# Patient Record
Sex: FEMALE | Race: WHITE | NOT HISPANIC OR LATINO | Employment: OTHER | URBAN - METROPOLITAN AREA
[De-identification: names, ages, dates, MRNs, and addresses within clinical notes are randomized per-mention and may not be internally consistent; named-entity substitution may affect disease eponyms.]

---

## 2018-01-10 ENCOUNTER — TELEPHONE (OUTPATIENT)
Dept: PAIN MEDICINE | Facility: CLINIC | Age: 73
End: 2018-01-10

## 2018-01-10 ENCOUNTER — APPOINTMENT (OUTPATIENT)
Dept: WOMENS IMAGING | Facility: HOSPITAL | Age: 73
End: 2018-01-10

## 2018-01-10 PROCEDURE — 77067 SCR MAMMO BI INCL CAD: CPT | Performed by: RADIOLOGY

## 2018-02-28 PROBLEM — R51.9 FREQUENT HEADACHES: Status: ACTIVE | Noted: 2018-02-28

## 2018-02-28 PROBLEM — M79.18 MYOFASCIAL PAIN: Status: ACTIVE | Noted: 2018-02-28

## 2018-02-28 PROBLEM — F10.11 HISTORY OF ALCOHOL ABUSE: Status: ACTIVE | Noted: 2018-02-28

## 2018-02-28 PROBLEM — M47.812 CERVICAL SPONDYLOSIS WITHOUT MYELOPATHY: Status: ACTIVE | Noted: 2018-02-28

## 2018-02-28 PROBLEM — Z95.5 HISTORY OF CORONARY ARTERY STENT PLACEMENT: Status: ACTIVE | Noted: 2018-02-28

## 2018-02-28 PROBLEM — Z98.890 HISTORY OF LUMBAR LAMINECTOMY FOR SPINAL CORD DECOMPRESSION: Status: ACTIVE | Noted: 2018-02-28

## 2018-03-01 ENCOUNTER — OFFICE VISIT (OUTPATIENT)
Dept: PAIN MEDICINE | Facility: CLINIC | Age: 73
End: 2018-03-01

## 2018-03-01 VITALS
HEIGHT: 63 IN | TEMPERATURE: 96.2 F | SYSTOLIC BLOOD PRESSURE: 124 MMHG | RESPIRATION RATE: 18 BRPM | WEIGHT: 176 LBS | HEART RATE: 64 BPM | OXYGEN SATURATION: 98 % | DIASTOLIC BLOOD PRESSURE: 78 MMHG | BODY MASS INDEX: 31.18 KG/M2

## 2018-03-01 DIAGNOSIS — Z98.890 HISTORY OF LUMBAR LAMINECTOMY FOR SPINAL CORD DECOMPRESSION: ICD-10-CM

## 2018-03-01 DIAGNOSIS — Z95.5 HISTORY OF CORONARY ARTERY STENT PLACEMENT: ICD-10-CM

## 2018-03-01 DIAGNOSIS — M47.812 CERVICAL SPONDYLOSIS WITHOUT MYELOPATHY: ICD-10-CM

## 2018-03-01 DIAGNOSIS — M79.18 MYOFASCIAL PAIN: ICD-10-CM

## 2018-03-01 DIAGNOSIS — F10.11 HISTORY OF ALCOHOL ABUSE: ICD-10-CM

## 2018-03-01 DIAGNOSIS — R51.9 FREQUENT HEADACHES: ICD-10-CM

## 2018-03-01 PROCEDURE — 99214 OFFICE O/P EST MOD 30 MIN: CPT | Performed by: ANESTHESIOLOGY

## 2018-03-01 RX ORDER — LISINOPRIL 5 MG/1
TABLET ORAL DAILY
COMMUNITY
Start: 2015-07-08 | End: 2019-10-10

## 2018-03-01 RX ORDER — FLUOXETINE HYDROCHLORIDE 40 MG/1
40 CAPSULE ORAL DAILY
COMMUNITY
End: 2022-05-31 | Stop reason: SDUPTHER

## 2018-03-01 RX ORDER — CLOPIDOGREL BISULFATE 75 MG/1
75 TABLET ORAL DAILY
Status: ON HOLD | COMMUNITY
Start: 2015-07-08 | End: 2019-10-19 | Stop reason: SDUPTHER

## 2018-03-01 RX ORDER — ASPIRIN 81 MG/1
81 TABLET, CHEWABLE ORAL DAILY
Status: ON HOLD | COMMUNITY
Start: 2015-07-08 | End: 2022-11-04 | Stop reason: SDUPTHER

## 2018-03-01 RX ORDER — TIZANIDINE 2 MG/1
TABLET ORAL
Qty: 90 TABLET | Refills: 3 | Status: SHIPPED | OUTPATIENT
Start: 2018-03-01 | End: 2018-08-21 | Stop reason: SDUPTHER

## 2018-03-01 RX ORDER — ATORVASTATIN CALCIUM 80 MG/1
TABLET, FILM COATED ORAL
COMMUNITY
Start: 2015-07-08 | End: 2019-10-10

## 2018-03-01 RX ORDER — RANITIDINE 150 MG/1
75 CAPSULE ORAL EVERY EVENING
COMMUNITY
Start: 2015-07-08 | End: 2022-07-12

## 2018-03-01 RX ORDER — ALBUTEROL SULFATE 90 UG/1
AEROSOL, METERED RESPIRATORY (INHALATION) EVERY 4 HOURS
COMMUNITY
Start: 2015-07-08 | End: 2019-03-06

## 2018-03-01 RX ORDER — ACETAMINOPHEN 325 MG/1
650 TABLET ORAL EVERY 4 HOURS PRN
COMMUNITY
Start: 2015-07-08

## 2018-03-01 NOTE — PROGRESS NOTES
"Chief Complaint: \"I did great after my injection in 2015. Now, my neck pain has returned.\"     History of Present Illness:   Patient: Ms. Alysia Aguayo, 72 y.o. female   Former patient. Last seen on 07/22/2015 for diagnostic/therapeutic right cervical facet joint injections at C5-C6, C6-C7, combined with trigger point injections at the right trapezius, and right levator scapulae.  Patient experienced complete pain relief and functional improvement that lasted for more than two years. Then, pain progressively recurred although not to baseline levels.  Pain history: Patient reports a 20 year history of pain, which began after riding a roller coaster.   Pain description: constant pain with intermittent exacerbation, described as aching, sharp and throbbing sensation.   Radiation of pain: The neck pain radiates into the shoulder blades. Patient reports the pain on the right side is worse than the pain on the left side.    Pain intensity today: 3/10  Average pain intensity last week: 8/10  Pain intensity ranges from: 2/10 to 8/10  Aggravating factors: Pain increases with range of motion of the cervical spine, sitting up straight for long periods of times, household chores, moving, and being active.  Alleviating factors: Pain decreases with analgesics and lying down.   Associated symptoms:   Patient denies pain, numbness and weakness in the bilateral upper extremities.   Patient denies any new bladder or bowel problems.   Patient denies difficulties with her balance or recent falls.   Patient reports occipital headaches associated with her neck pain.     Review of previous therapies and additional medical records:  Alysia Aguayo has already failed the following measures, including:   Conservative measures: oral analgesics, physical therapy, ice and heat   Interventional measures: Patient underwent diagnostic/therapeutic right cervical facet joint injections at C5-C6, C6-C7, combined with trigger point injections at " the right trapezius, and right levator scapulae on 07/22/2015 and reports complete pain relief for almost 2 years.   Surgical measures: None  Alysia Aguayo underwent neurosurgical consultation with Dr. Dionicio Pollard on 05/12/2015, and was found not to be a surgical candidate.  Alysia Aguayo presents with significant comorbidities including depression, hypertension, coronary artery disease with anticoagulation therapy, and hyperlipidemia; engaged in treatment. Patient takes Plavix daily, prescribed by Dr. Bloom related to placement of 3 stents in the heart. She reports discontinuing her anticoagulant therapy in the past to undergo procedures.  In terms of current analgesics, Alysia Aguayo takes: Tylenol  I have reviewed [facility] Report #[id number] consistent to medication reconciliation.      Review of Previous Diagnostic Studies:    MRI Cervical Spine w/o Contrast on 04/28/2015: Cervical facet arthropathy. C5-C6, moderate to high-grade bilateral neural foraminal narrowing is seen. C6-C7, C6-C7, moderate to high-grade bilateral neural foraminal narrowing.  X-Ray Cervical Spine 4 View on 04/21/2015: Mild to moderate degenerative changes worse at C6-C7. Mild anterior listhesis of C5 on C6.    Review of Systems   Musculoskeletal: Positive for neck pain and neck stiffness.   All other systems reviewed and are negative.     Patient Active Problem List   Diagnosis   • Cervical spondylosis without myelopathy   • Myofascial pain   • Frequent headaches   • History of alcohol abuse   • History of coronary artery stent placement   • History of lumbar laminectomy for spinal cord decompression       Past Medical History:   Diagnosis Date   • Heart disease    • Kidney disease    • Neck pain          Past Surgical History:   Procedure Laterality Date   • CHOLECYSTECTOMY     • HYSTERECTOMY     • TUBAL ABDOMINAL LIGATION           Family History   Problem Relation Age of Onset   • Heart disease Mother    • Emphysema Mother    •  "Cancer Father          Social History     Social History   • Marital status:      Social History Main Topics   • Smoking status: Former Smoker   • Smokeless tobacco: Former User     Quit date: 4/17/2009   • Alcohol use No   • Drug use: No   • Sexual activity: Defer           Current Outpatient Prescriptions:   •  acetaminophen (TYLENOL) 325 MG tablet, Take  by mouth., Disp: , Rfl:   •  albuterol (VENTOLIN HFA) 108 (90 Base) MCG/ACT inhaler, Every 4 (Four) Hours., Disp: , Rfl:   •  aspirin (ASPIRIN 81) 81 MG chewable tablet, Chew Daily., Disp: , Rfl:   •  atorvastatin (LIPITOR) 80 MG tablet, Take  by mouth., Disp: , Rfl:   •  clopidogrel (PLAVIX) 75 MG tablet, Take  by mouth Daily., Disp: , Rfl:   •  FLUoxetine (PROZAC) 40 MG capsule, Take 40 mg by mouth Daily., Disp: , Rfl:   •  lisinopril (PRINIVIL,ZESTRIL) 5 MG tablet, Take  by mouth Daily., Disp: , Rfl:   •  ranitidine (ZANTAC) 150 MG capsule, Take  by mouth., Disp: , Rfl:       Allergies   Allergen Reactions   • Codeine GI Intolerance   • Penicillins Hives         /78 (BP Location: Right arm)  Pulse 64  Temp 96.2 °F (35.7 °C) (Temporal Artery )   Resp 18  Ht 160 cm (63\")  Wt 79.8 kg (176 lb)  SpO2 98%  BMI 31.18 kg/m2      Physical Exam:  Constitutional: Patient is oriented to person, place, and time. Vital signs are normal. Patient appears well-developed and well-nourished.   HENT: Head: Normocephalic and atraumatic. Eyes: Conjunctivae and lids are normal. Pupils: Equal, round, reactive to light.   Neck: Trachea normal. Neck supple. No JVD present.   Lymphatic: No cervical adenopathy  Pulmonary Respiratory effort: No increased work of breathing or signs of respiratory distress. Auscultation of lungs: Clear to auscultation.   Cardiovascular Auscultation of heart: Normal rate and rhythm, normal S1 and S2, no murmurs.   Musculoskeletal   Gait and station: Gait evaluation demonstrated a normal gait   Cervical spine: Passive and active range of " motion is limited secondary to pain. Extension, lateral flexion, rotation of the cervical spine increased and reproduced pain. Cervical facet joint loading maneuvers are positive.  Muscles: Presence of active trigger points a the right levator scapulae and right trapezius   Shoulders: The range of motion of the glenohumeral joints is full and without pain. Rotator cuff strength is 5/5.   Neurological: Patient is alert and oriented to person, place, and time. Speech: speech is normal. Cortical function: Normal mental status.   Cranial nerves: Cranial nerves 2-12 intact.   Reflex Scores:  Right brachioradialis: 2+  Left brachioradialis: 2+  Right biceps: 2+  Left biceps: 2+  Right triceps: 2+  Left triceps: 2+  Right patellar: 2+  Left patellar: 2+  Right achilles: 2+  Left achilles: 2+  Motor strength: 5/5  Motor Tone: normal tone.   Involuntary movements: none.   Superficial/Primitive Reflexes: primitive reflexes were absent.   Right Fox: absent  Left Fox: absent  Right ankle clonus: absent  Left ankle clonus: absent   Spurling sign is negative. Lhermitte sign is negative. Negative long tract signs. Straight leg raising test is negative. Femoral stretch sign is negative.   Sensation: No sensory loss. Sensory exam: intact to light touch, intact pain and temperature sensation, intact vibration sensation and normal proprioception.   Coordination: Normal finger to nose and heel to shin. Normal balance and negative. Romberg's sign negative.   Skin and subcutaneous tissue: Skin is warm and intact. No rash noted. No cyanosis.   Psychiatric: Judgment and insight: Normal. Orientation to person, place and time: Normal. Recent and remote memory: Intact. Mood and affect: Normal.     ASSESSMENT:   1. Cervical spondylosis without myelopathy    2. Myofascial pain    3. Frequent headaches    4. History of lumbar laminectomy for spinal cord decompression    5. History of coronary artery stent placement    6. History of  alcohol abuse      PLAN/MEDICAL DECISION MAKING:  I had a lengthy conversation with Ms. Alysia Aguayo regarding her chronic pain condition and potential therapeutic options including risks, benefits, alternative therapies, to name a few. Patient has failed to obtain pain relief with conservative measures, as referenced above. Patient underwent diagnostic/therapeutic right cervical facet joint injections at C5-C6, C6-C7, combined with trigger point injections at the right trapezius, and right levator scapulae on 07/22/2015 and reports complete pain relief for more than 2 years. Her cervical ROM continues to be improved.  I have reviewed all available patient's medical records as well as previous therapies as referenced above.  Therefore, I have proposed the following plan:  1. Interventional pain management measures: Patient will be scheduled for right cervical facet joint injections at C5-C6, C6-C7, combined with trigger point injections at the right trapezius, and right levator scapulae . If patient fails to obtain sustained pain relief, then, we will proceed with diagnostic bilateral cervical medial branch blocks at C4, C5, C6; for bilateral cervical facet joints at C5-C6, C6-C7 to clarify the origin of her unrelenting pain. If patient experiences more than 80% pain relief along with significant improvement in the range of motion of the cervical spine, then, patient will be scheduled for a second set of diagnostic bilateral cervical medial branch blocks, to then, proceed with cervical medial branch rhizotomies.  2. Pharmacological measures, as follows;  A. Trial with lamotrigine 2.5%, lidocaine 2.22%, prilocaine 2.22%, meloxicam 0.09% cream, apply 1 to 2 grams of cream to the affected areas every 4 to 6 hours as needed  B. Trial with tizanidine 2 mg ½ to 1 tablet three times a day as needed for muscle spasms   3.  Long-term rehabilitation efforts:  A. The patient does not have a history of falls. I did complete  a risk assessment for falls.   B. Patient will start a comprehensive physical therapy program for water therapy, upper body strengthening/posture correction, gait and balance training, neurodynamics, myofascial release, cupping and dry needling   C. Start an exercise program such as yoga and water therapy  D. Trial with TENS unit  E. Contrast therapy: Apply ice-packs for 15-20 minutes, followed by heating pads for 15-20 minutes to affected area   4. The patient and her family have been instructed to contact my office with any questions or difficulties. The patient understands the plan and agrees to proceed accordingly.       Patient Care Team:  ALEX Vick as PCP - General  Dionicio Pollard MD as Consulting Physician (Neurosurgery)  Heriberto Lopes MD as Consulting Physician (Pain Medicine)     New Medications Ordered This Visit   Medications   • clopidogrel (PLAVIX) 75 MG tablet     Sig: Take  by mouth Daily.   • atorvastatin (LIPITOR) 80 MG tablet     Sig: Take  by mouth.   • lisinopril (PRINIVIL,ZESTRIL) 5 MG tablet     Sig: Take  by mouth Daily.   • ranitidine (ZANTAC) 150 MG capsule     Sig: Take  by mouth.   • acetaminophen (TYLENOL) 325 MG tablet     Sig: Take  by mouth.   • albuterol (VENTOLIN HFA) 108 (90 Base) MCG/ACT inhaler     Sig: Every 4 (Four) Hours.   • aspirin (ASPIRIN 81) 81 MG chewable tablet     Sig: Chew Daily.   • FLUoxetine (PROZAC) 40 MG capsule     Sig: Take 40 mg by mouth Daily.         Future Appointments  Date Time Provider Department Center   3/1/2018 2:15 PM Heriberto Lopes MD [facility] None         Heriberto Lopes MD     EMR Dragon/Transcription disclaimer:  Much of this encounter note is an electronic transcription of spoken language to printed text. Electronic transcription of spoken language may permit erroneous, or at times, nonsensical words or phrases to be inadvertently transcribed. Although I have reviewed the note for such errors, some may still exist.

## 2018-03-07 ENCOUNTER — OUTSIDE FACILITY SERVICE (OUTPATIENT)
Dept: PAIN MEDICINE | Facility: CLINIC | Age: 73
End: 2018-03-07

## 2018-03-07 PROCEDURE — 99152 MOD SED SAME PHYS/QHP 5/>YRS: CPT | Performed by: ANESTHESIOLOGY

## 2018-03-07 PROCEDURE — 64490 INJ PARAVERT F JNT C/T 1 LEV: CPT | Performed by: ANESTHESIOLOGY

## 2018-03-07 PROCEDURE — 64491 INJ PARAVERT F JNT C/T 2 LEV: CPT | Performed by: ANESTHESIOLOGY

## 2018-07-24 ENCOUNTER — TELEPHONE (OUTPATIENT)
Dept: PAIN MEDICINE | Facility: CLINIC | Age: 73
End: 2018-07-24

## 2018-07-24 NOTE — TELEPHONE ENCOUNTER
Patient called on 07/23/2018 to make an appointment for evaluation of recurrent cervicalgia. Patient was last seen 03/09/2018, when she underwent diagnostic and therapeutic right cervical facet joint injections and experienced complete pain relief since then. She did not do PT after her procedure. She was not able to get a tens unit covered by insurance.   She is now complaining of left-sided neck pain that radiates into the left shoulder and left occipital region.   Pain increases with flexion, extension and rotation of the cervical spine   Pain decreases with: Nothing   She has tried ice, heat, icy hot, tylenol

## 2018-08-16 NOTE — PROGRESS NOTES
"Chief Complaint: \"Pain on the left side of my neck.\"     History of Present Illness: Ms. Alysia Aguayo, 72 y.o. female, who was last seen on 03/09/2018, when she underwent diagnostic and therapeutic right cervical facet joint injections and experienced almost complete pain relief for several months. She did not participate in PT after her procedure despite medical advice. She was not able to get a TENs unit due to her insurance. Patient called on 07/23/2018 to make an appointment for evaluation of recurrent cervicalgia. She is now complaining of left-sided neck pain that radiates into the left shoulder and left occipital region.   Pain history: Patient reports a 20+-year history of pain, which began after riding a roller coaster.   Pain description: constant pain with intermittent exacerbation, described as aching, sharp and throbbing sensation.   Radiation of pain: The neck pain radiates into the shoulder blades. Patient reports on left side of her neck. The right-sided pain has almost completely resolved since her procedure on 03/09/2018.  Pain intensity today: 7/10  Average pain intensity last week: 7/10  Pain intensity ranges from: 5/10 to 7/10  Aggravating factors: Pain increases with flexion, extension and rotation of the cervical spine   Alleviating factors: Tylenol, ice, heat, icy hot,     Associated symptoms:   Patient denies pain, numbness or weakness in the upper extremities.   Patient denies any new bladder or bowel problems.   Patient denies difficulties with her balance or recent falls.   Patient reports daily bilateral occipital headaches associated with her neck pain lasting all day.     Review of previous therapies and additional medical records:  Alysia Aguayo has already failed the following measures, including:   Conservative measures: oral analgesics, physical therapy, ice and heat   Interventional measures: Patient underwent diagnostic/therapeutic right cervical facet joint injections at " C5-C6, C6-C7, combined with trigger point injections at the right trapezius, and right levator scapulae on 07/22/2015 and reports complete pain relief for almost 2 years.   Surgical measures: None  Alysia Aguayo underwent neurosurgical consultation with Dr. Dionicio Pollard on 05/12/2015, and was found not to be a surgical candidate.  Alysia Aguayo presents with significant comorbidities including depression, hypertension, hyperlipidemia, coronary artery disease with anticoagulation therapy; engaged in treatment. Patient takes Plavix daily, prescribed by Dr. Bloom due to placement of 3 stents in the heart. She reports discontinuing her anticoagulant therapy in the past to undergo procedures.  In terms of current analgesics, Alysia Aguayo takes: Tylenol  I have reviewed [facility] Report #[id number] consistent to medication reconciliation.    Global Pain Scale 08-21-18          Pain 15          Feelings 0          Clinical outcomes 3          Activities 9          GPS Total: 27            Review of Diagnostic Studies:    MRI Cervical Spine w/o Contrast on 04/28/2015: Cervical facet arthropathy. C5-C6, moderate to high-grade bilateral neural foraminal narrowing. C6-C7, C6-C7, moderate to high-grade bilateral neural foraminal narrowing.  X-Ray Cervical Spine 4 View on 04/21/2015: Mild to moderate degenerative changes worse at C6-C7. Mild anterior listhesis of C5 on C6.    Review of Systems   Musculoskeletal: Positive for neck pain and neck stiffness.   All other systems reviewed and are negative.     Patient Active Problem List   Diagnosis   • Cervical spondylosis without myelopathy   • Myofascial pain   • Frequent headaches   • History of alcohol abuse   • History of coronary artery stent placement   • History of lumbar laminectomy for spinal cord decompression       Past Medical History:   Diagnosis Date   • Heart disease    • Kidney disease    • Neck pain          Past Surgical History:   Procedure Laterality Date  "  • CHOLECYSTECTOMY     • HYSTERECTOMY     • TUBAL ABDOMINAL LIGATION           Family History   Problem Relation Age of Onset   • Heart disease Mother    • Emphysema Mother    • Cancer Father          Social History     Social History   • Marital status:      Social History Main Topics   • Smoking status: Former Smoker   • Smokeless tobacco: Former User     Quit date: 4/17/2009   • Alcohol use No   • Drug use: No   • Sexual activity: Defer     Other Topics Concern   • Not on file           Current Outpatient Prescriptions:   •  acetaminophen (TYLENOL) 325 MG tablet, Take  by mouth., Disp: , Rfl:   •  albuterol (VENTOLIN HFA) 108 (90 Base) MCG/ACT inhaler, Every 4 (Four) Hours., Disp: , Rfl:   •  aspirin (ASPIRIN 81) 81 MG chewable tablet, Chew Daily., Disp: , Rfl:   •  atorvastatin (LIPITOR) 80 MG tablet, Take  by mouth., Disp: , Rfl:   •  clopidogrel (PLAVIX) 75 MG tablet, Take  by mouth Daily., Disp: , Rfl:   •  FLUoxetine (PROZAC) 40 MG capsule, Take 40 mg by mouth Daily., Disp: , Rfl:   •  lisinopril (PRINIVIL,ZESTRIL) 5 MG tablet, Take  by mouth Daily., Disp: , Rfl:   •  ranitidine (ZANTAC) 150 MG capsule, Take  by mouth., Disp: , Rfl:   •  tiZANidine (ZANAFLEX) 2 MG tablet, Take half to 1 tablet three times a day as needed for muscle spasms., Disp: 90 tablet, Rfl: 3      Allergies   Allergen Reactions   • Codeine GI Intolerance   • Penicillins Hives         /80   Pulse 81   Temp 97.5 °F (36.4 °C) (Temporal Artery )   Resp 18   Ht 160 cm (63\")   Wt 77.1 kg (170 lb)   SpO2 98%   BMI 30.11 kg/m²       Physical Exam:  Constitutional: Patient is oriented to person, place, and time. Patient appears well-developed and well-nourished.   HENT: Head: Normocephalic and atraumatic. Eyes: Conjunctivae and lids are normal. Pupils: Equal, round, reactive to light.   Neck: Trachea normal. Neck supple. No JVD present.   Lymphatic: No cervical adenopathy  Pulmonary Respiratory effort: No increased work of " breathing or signs of respiratory distress. Auscultation of lungs: Clear to auscultation.   Cardiovascular Auscultation of heart: Normal rate and rhythm, normal S1 and S2, no murmurs.   Musculoskeletal   Gait and station: Gait evaluation demonstrated a normal gait   Cervical spine: Passive and active range of motion are limited secondary to pain. Extension, lateral flexion, rotation of the cervical spine increased and reproduced pain. Cervical facet joint loading maneuvers are positive.  Muscles: Presence of active trigger points a the right levator scapulae and right trapezius   Shoulders: The range of motion of the glenohumeral joints is full and without pain. Rotator cuff strength is 5/5.   Neurological: Patient is alert and oriented to person, place, and time. Speech: speech is normal. Cortical function: Normal mental status.   Cranial nerves: Cranial nerves 2-12 intact.   Reflex Scores:  Right brachioradialis: 2+  Left brachioradialis: 2+  Right biceps: 2+  Left biceps: 2+  Right triceps: 2+  Left triceps: 2+  Right patellar: 2+  Left patellar: 2+  Right achilles: 2+  Left achilles: 2+  Motor strength: 5/5  Motor Tone: normal tone.   Involuntary movements: none.   Superficial/Primitive Reflexes: primitive reflexes were absent.   Right Fox: absent  Left Fox: absent  Right ankle clonus: absent  Left ankle clonus: absent   Spurling sign is negative. Lhermitte sign is negative. Negative long tract signs. Straight leg raising test is negative.   Sensation: No sensory loss. Sensory exam: intact to light touch, intact pain and temperature sensation, intact vibration sensation and normal proprioception.   Coordination: Normal finger to nose and heel to shin. Normal balance and negative. Romberg's sign negative.   Skin and subcutaneous tissue: Skin is warm and intact. No rash noted. No cyanosis.   Psychiatric: Judgment and insight: Normal. Orientation to person, place and time: Normal. Recent and remote memory:  Intact. Mood and affect: Normal.     ASSESSMENT:   1. Cervical spondylosis without myelopathy    2. Myofascial pain    3. Frequent headaches    4. History of lumbar laminectomy for spinal cord decompression    5. History of coronary artery stent placement    6. History of alcohol abuse      PLAN/MEDICAL DECISION MAKING: I had a lengthy conversation with Ms. Alysia Aguayo regarding her chronic pain condition and potential therapeutic options including risks, benefits, alternative therapies, to name a few. Patient has failed to obtain pain relief with conservative measures, as referenced above. Patient underwent right cervical facet joint injections and reports complete ongoing pain relief on the right side of her neck. She is experiencing left-sided neck pain at this time. I have reviewed all available patient's medical records as well as previous therapies as referenced above.  Therefore, I have proposed the following plan:  1. Interventional pain management measures: Patient will be scheduled for bilateral cervical facet joint injections at C5-C6, C6-C7. If patient fails to obtain sustained pain relief, then, we will proceed with diagnostic bilateral cervical medial branch blocks at C4, C5, C6; for bilateral cervical facet joints at C5-C6, C6-C7 to clarify the origin of her unrelenting pain. If patient experiences more than 80% pain relief along with significant improvement in the range of motion of the cervical spine, then, patient will be scheduled for a second set of diagnostic bilateral cervical medial branch blocks, to then, proceed with cervical medial branch rhizotomies.  2. Pharmacological measures, as follows;  A. Trial with lamotrigine 2.5%, lidocaine 2.22%, prilocaine 2.22%, meloxicam 0.09% cream, apply 1 to 2 grams of cream to the affected areas every 4 to 6 hours as needed  B. Continue tizanidine 2 mg ½ tablet three times a day as needed for muscle spasms  C. Trial with Rheumate once daily  D. Start  pyridoxine 25 mg three times a day   3.  Long-term rehabilitation efforts:  A. The patient does not have a history of falls. I did complete a risk assessment for falls.   B. Patient will start a comprehensive physical therapy program for water therapy, upper body strengthening/posture correction, gait and balance training, neurodynamics, myofascial release, cupping and dry needling   C. Start an exercise program such as yoga and water therapy  D. Trial with TENS unit  E. Contrast therapy: Apply ice-packs for 15-20 minutes, followed by heating pads for 15-20 minutes to affected area   4. The patient and her family have been instructed to contact my office with any questions or difficulties. The patient understands the plan and agrees to proceed accordingly.       Patient Care Team:  Yesica Zhu PA as PCP - General  Dionicio Pollard MD as Consulting Physician (Neurosurgery)  Heriberto Lopes MD as Consulting Physician (Pain Medicine)     No orders of the defined types were placed in this encounter.        No future appointments.      Heriberto Lopes MD     EMR Dragon/Transcription disclaimer:  Much of this encounter note is an electronic transcription of spoken language to printed text. Electronic transcription of spoken language may permit erroneous, or at times, nonsensical words or phrases to be inadvertently transcribed. Although I have reviewed the note for such errors, some may still exist.

## 2018-08-20 ENCOUNTER — TELEPHONE (OUTPATIENT)
Dept: PAIN MEDICINE | Facility: CLINIC | Age: 73
End: 2018-08-20

## 2018-08-21 ENCOUNTER — TELEPHONE (OUTPATIENT)
Dept: PAIN MEDICINE | Facility: CLINIC | Age: 73
End: 2018-08-21

## 2018-08-21 ENCOUNTER — OFFICE VISIT (OUTPATIENT)
Dept: PAIN MEDICINE | Facility: CLINIC | Age: 73
End: 2018-08-21

## 2018-08-21 VITALS
TEMPERATURE: 97.5 F | SYSTOLIC BLOOD PRESSURE: 120 MMHG | RESPIRATION RATE: 18 BRPM | BODY MASS INDEX: 30.12 KG/M2 | DIASTOLIC BLOOD PRESSURE: 80 MMHG | HEIGHT: 63 IN | WEIGHT: 170 LBS | HEART RATE: 81 BPM | OXYGEN SATURATION: 98 %

## 2018-08-21 DIAGNOSIS — F10.11 HISTORY OF ALCOHOL ABUSE: ICD-10-CM

## 2018-08-21 DIAGNOSIS — M79.18 MYOFASCIAL PAIN: ICD-10-CM

## 2018-08-21 DIAGNOSIS — R51.9 FREQUENT HEADACHES: ICD-10-CM

## 2018-08-21 DIAGNOSIS — M47.812 CERVICAL SPONDYLOSIS WITHOUT MYELOPATHY: ICD-10-CM

## 2018-08-21 DIAGNOSIS — Z98.890 HISTORY OF LUMBAR LAMINECTOMY FOR SPINAL CORD DECOMPRESSION: ICD-10-CM

## 2018-08-21 DIAGNOSIS — Z95.5 HISTORY OF CORONARY ARTERY STENT PLACEMENT: ICD-10-CM

## 2018-08-21 PROCEDURE — 99213 OFFICE O/P EST LOW 20 MIN: CPT | Performed by: ANESTHESIOLOGY

## 2018-08-21 RX ORDER — ME-TETRAHYDROFOLATE/B12/HRB236 1-1-500 MG
CAPSULE ORAL
Qty: 30 CAPSULE | Refills: 5 | Status: SHIPPED | OUTPATIENT
Start: 2018-08-21 | End: 2019-03-06

## 2018-08-21 RX ORDER — TIZANIDINE 2 MG/1
TABLET ORAL
Qty: 90 TABLET | Refills: 5 | Status: SHIPPED | OUTPATIENT
Start: 2018-08-21 | End: 2019-03-06

## 2018-08-21 RX ORDER — LANOLIN ALCOHOL/MO/W.PET/CERES
CREAM (GRAM) TOPICAL
Qty: 45 TABLET | Refills: 5 | Status: SHIPPED | OUTPATIENT
Start: 2018-08-21 | End: 2019-05-20

## 2018-08-21 RX ORDER — PYRIDOXINE HCL (VITAMIN B6) 25 MG
25 TABLET ORAL 3 TIMES DAILY
Qty: 90 TABLET | Refills: 5 | Status: SHIPPED | OUTPATIENT
Start: 2018-08-21 | End: 2018-08-21 | Stop reason: CLARIF

## 2018-08-21 NOTE — TELEPHONE ENCOUNTER
Received communication from [facility] stating that Pyridoxine 25 mg tid is not covered by patient's insurance. They are requesting change to Vitamin B6 50 mg 1/2 tab tid.     Per Dr. Lopes: ok to change medication    New order sent to [facility]

## 2018-08-27 ENCOUNTER — OUTSIDE FACILITY SERVICE (OUTPATIENT)
Dept: PAIN MEDICINE | Facility: CLINIC | Age: 73
End: 2018-08-27

## 2018-08-27 PROCEDURE — 99152 MOD SED SAME PHYS/QHP 5/>YRS: CPT | Performed by: ANESTHESIOLOGY

## 2018-08-27 PROCEDURE — 64490 INJ PARAVERT F JNT C/T 1 LEV: CPT | Performed by: ANESTHESIOLOGY

## 2018-08-27 PROCEDURE — 64491 INJ PARAVERT F JNT C/T 2 LEV: CPT | Performed by: ANESTHESIOLOGY

## 2019-03-06 ENCOUNTER — HOSPITAL ENCOUNTER (OUTPATIENT)
Dept: GENERAL RADIOLOGY | Facility: HOSPITAL | Age: 74
Discharge: HOME | End: 2019-03-06
Admitting: NEUROLOGICAL SURGERY

## 2019-03-06 ENCOUNTER — OFFICE VISIT (OUTPATIENT)
Dept: NEUROSURGERY | Facility: CLINIC | Age: 74
End: 2019-03-06

## 2019-03-06 VITALS — TEMPERATURE: 97.8 F | BODY MASS INDEX: 31.18 KG/M2 | WEIGHT: 176 LBS | HEIGHT: 63 IN

## 2019-03-06 DIAGNOSIS — M48.062 SPINAL STENOSIS OF LUMBAR REGION WITH NEUROGENIC CLAUDICATION: Primary | ICD-10-CM

## 2019-03-06 DIAGNOSIS — M51.36 DEGENERATIVE DISC DISEASE, LUMBAR: ICD-10-CM

## 2019-03-06 DIAGNOSIS — M51.36 BULGING LUMBAR DISC: ICD-10-CM

## 2019-03-06 DIAGNOSIS — M47.816 FACET ARTHRITIS OF LUMBAR REGION: ICD-10-CM

## 2019-03-06 DIAGNOSIS — M53.2X6 LUMBAR SPINE INSTABILITY: ICD-10-CM

## 2019-03-06 PROCEDURE — 72110 X-RAY EXAM L-2 SPINE 4/>VWS: CPT

## 2019-03-06 PROCEDURE — 99203 OFFICE O/P NEW LOW 30 MIN: CPT | Performed by: NEUROLOGICAL SURGERY

## 2019-03-06 NOTE — PROGRESS NOTES
Patient: Alysia Aguayo  : 1945    Primary Care Provider: Yesica Zhu PA    Requesting Provider: As above        History    Chief Complaint:   1.  Low back and bilateral buttock pain.  2.  Chronic neck pain.    History of Present Illness: Ms. Aguayo is a 73-year-old unemployed woman who is known to my service.  On 7/10/14 she underwent right L1-2 discectomy and did well.  She has chronically had neck pain.  She does undergo intermittent injections in her neck by Dr. Lopes.  She has no arm symptoms.  Her main complaint is low back pain that extends into her buttocks.  Her legs feel heavy with walking.  Intermittently she gets numbness in the upper medial aspect of her left leg.  Her back difficulties are better lying down and when flexing forward at the waist.  She is worse with standing and walking.  She denies bowel or bladder dysfunction.    Review of Systems   Constitutional: Negative for activity change, appetite change, chills, diaphoresis, fatigue, fever and unexpected weight change.   HENT: Negative for congestion, dental problem, drooling, ear discharge, ear pain, facial swelling, hearing loss, mouth sores, nosebleeds, postnasal drip, rhinorrhea, sinus pressure, sinus pain, sneezing, sore throat, tinnitus, trouble swallowing and voice change.    Eyes: Negative for photophobia, pain, discharge, redness, itching and visual disturbance.   Respiratory: Negative for apnea, cough, choking, chest tightness, shortness of breath, wheezing and stridor.    Cardiovascular: Negative for chest pain, palpitations and leg swelling.   Gastrointestinal: Negative for abdominal distention, abdominal pain, anal bleeding, blood in stool, constipation, diarrhea, nausea, rectal pain and vomiting.   Endocrine: Negative for cold intolerance, heat intolerance, polydipsia, polyphagia and polyuria.   Genitourinary: Negative for decreased urine volume, difficulty urinating, dysuria, enuresis, flank pain, frequency,  "genital sores, hematuria and urgency.   Musculoskeletal: Positive for back pain, gait problem, myalgias, neck pain and neck stiffness. Negative for arthralgias and joint swelling.   Skin: Negative for color change, pallor, rash and wound.   Allergic/Immunologic: Negative for environmental allergies, food allergies and immunocompromised state.   Neurological: Negative for dizziness, tremors, seizures, syncope, facial asymmetry, speech difficulty, weakness, light-headedness, numbness and headaches.   Hematological: Negative for adenopathy. Does not bruise/bleed easily.   Psychiatric/Behavioral: Negative for agitation, behavioral problems, confusion, decreased concentration, dysphoric mood, hallucinations, self-injury, sleep disturbance and suicidal ideas. The patient is not nervous/anxious and is not hyperactive.        The patient's past medical history, past surgical history, family history, and social history have been reviewed at length in the electronic medical record.    Physical Exam:   Temp 97.8 °F (36.6 °C) (Temporal)   Ht 160 cm (63\")   Wt 79.8 kg (176 lb)   BMI 31.18 kg/m²   CONSTITUTIONAL: Patient is well-nourished, pleasant and appears stated age.  CV: Heart regular rate and rhythm without murmur, rub, or gallop.  PULMONARY: Lungs are clear to ascultation.  MUSCULOSKELETAL:  Straight leg raising is negative.  Harry's Sign is negative.  ROM in back normal.  Tenderness in the back to palpation is not observed.  NEUROLOGICAL:  Orientation, memory, attention span, language function, and cognition have been examined and are intact.  Strength is intact in the lower extremities to direct testing.  Muscle tone is normal throughout.  Station and gait are normal.  Sensation is intact to light touch testing throughout.  Deep tendon reflexes are 1+ and symmetrical.  Coordination is intact.      Medical Decision Making    Data Review:   Cervical MRI dated 2/28/19 demonstrates diffuse spondylosis.  There is a " low-grade offset of C5 on C6.  Lumbar MRI from the same date demonstrates laminotomy defect on the right at L1-2.  Disc protrusion into the recess and foramen on the left is noted at L2-3.  There is moderate to generous stenosis at L3-4 and L4-5.  There is some degree of disc protrusion more leftward at L3-4.    Diagnosis:   1.  Lumbar stenosis with neurogenic claudication.  2.  Lumbar degenerative disc disease.  3.  Lumbar degenerative joint disease.  4.  Cervical spondylosis with mechanical neck pain.    Treatment Options:   I discussed treatment options with the patient.  She is not amenable to physical therapy given transportation issues.  I am going to refer her back to Dr. Lopes for some lumbar epidural injections.  Prior to follow-up thereafter I will check some plain flexion-extension lumbar spine x-rays to ensure that there is no instability.  If her difficulties persist then we may need to consider decompressive laminectomies at L3-4 and L4-5.  Treatment for her neck should remain symptomatic.  There is not a surgical option for her neck difficulties.       Diagnosis Plan   1. Spinal stenosis of lumbar region with neurogenic claudication     2. Facet arthritis of lumbar region (CMS/HCC)     3. Degenerative disc disease, lumbar     4. Bulging lumbar disc     5. Lumbar spine instability  XR Spine Lumbar Flex & Ext       Scribed for Dionicio Pollard MD by Josefina Hernandez CMA on 03/06/2019 at 10:17 AM      I, Dr. Pollard, personally performed the services described in the documentation, as scribed in my presence, and it is both accurate and complete.

## 2019-03-11 NOTE — PROGRESS NOTES
"Chief Complaint: \"Low back pain.\"     History of Present Illness: Ms. Alysia Aguayo, 72 y.o. female, with a history of chronic neck and lower back pain.  She presents today for evaluation of her lower back pain.  Patient recently underwent neurosurgical evaulation by Dr. Pollard on 03/06/2019 for complaints of low back pain, and was referred back to this practice for lumbar epidural injections.    Pain description: constant pain with intermittent exacerbation, described as aching, sharp and throbbing sensation.   Radiation of pain:  Low back pain radiates into buttocks bilaterally.  Pain intensity today: 7/10  Average pain intensity last week: 7/10  Pain intensity ranges from: 5/10 to 7/10  Aggravating factors: Low back pain increases with standing and walking.   Alleviating factors: Low back pain decreases with lying down and forward flexion.     Associated symptoms:   Patient denies pain or weakness in the extremities.  Patient reports intermittent numbness in the upper medial aspect of her left leg.   Patient denies any new bladder or bowel problems.   Patient denies difficulties with her balance or recent falls.      Review of previous therapies and additional medical records:  Alysia Aguayo has already failed the following measures, including:   Conservative measures: oral analgesics, physical therapy, ice and heat   Interventional measures: Right C5-C6 and C6-C7 facet joint injections + TPI's at right levator scapulae and right trapezius on 03/07/2018 and 07/22/2015.   Surgical measures: Dmitry Aguayo underwent neurosurgical consultation with Dr. Dionicio Pollard on 03/06/2019, and was found to be a potential surgical candidate for decompressive laminectomies at L3-4 and L4-5.  She was found not to be a surgical candidate for her neck pain.  Alysia Aguayo presents with significant comorbidities including depression, hypertension, hyperlipidemia, coronary artery disease with anticoagulation therapy; " engaged in treatment. Patient takes Plavix daily, prescribed by Dr. Bloom due to placement of 3 stents in the heart. She reports discontinuing her anticoagulant therapy in the past to undergo procedures.  In terms of current analgesics, Alysia Aguayo takes: Tylenol  I have reviewed Karthik Report #[id number] consistent to medication reconciliation.    Global Pain Scale 08-21-18          Pain 15          Feelings 0          Clinical outcomes 3          Activities 9          GPS Total: 27            Review of Diagnostic Studies:    XR SPINE, LUMBAR, AP AND LATERAL WITH FLEXION AND EXTENSION-03/06/2019: Extensive degenerative changes seen at the L1/L2 level.  Slight scoliotic curvature with convexity to the right. Alignment stable in flexion and extension.  MRI Cervical Spine w/o Contrast- 02/28/2019: Degenerative changes throughout the cervical spine, particularly the lower half where there is at least mild central canal stenosis with mild cord impingement and advanced bilateral neural foraminal narrowing.  MRI Lumbar Spine w/o Contrast- 02/28/2019:   1. Moderate to advance multilevel degenerative disc disease with multilevel areas of moderate to advanced central canal stenosis and subarticular recess narrowing most severe at the L2-3, L3-4, and L4-5 levels.  2. Foraminal protrusion at the L3-4 level on the left with contact exiting left L3 nerve root.    Review of Systems   Musculoskeletal: Positive for arthralgias, back pain, gait problem, myalgias, neck pain and neck stiffness.   Neurological: Positive for weakness, light-headedness, numbness and headaches.   Psychiatric/Behavioral: Positive for agitation. The patient is nervous/anxious.    All other systems reviewed and are negative.     Patient Active Problem List   Diagnosis   • Cervical spondylosis without myelopathy   • Myofascial pain   • Frequent headaches   • History of alcohol abuse   • History of coronary artery stent placement   • History of lumbar  "laminectomy for spinal cord decompression       Past Medical History:   Diagnosis Date   • Heart disease    • Kidney disease    • Neck pain          Past Surgical History:   Procedure Laterality Date   • CHOLECYSTECTOMY     • HYSTERECTOMY     • LUMBAR DISCECTOMY  07/10/2014    Rt- L1/2 Dr. Dionicio Pollard    • TUBAL ABDOMINAL LIGATION           Family History   Problem Relation Age of Onset   • Heart disease Mother    • Emphysema Mother    • Cancer Father          Social History     Socioeconomic History   • Marital status:      Spouse name: Not on file   • Number of children: Not on file   • Years of education: Not on file   • Highest education level: Not on file   Tobacco Use   • Smoking status: Former Smoker   • Smokeless tobacco: Former User     Quit date: 4/17/2009   Substance and Sexual Activity   • Alcohol use: No   • Drug use: No   • Sexual activity: Defer           Current Outpatient Medications:   •  acetaminophen (TYLENOL) 325 MG tablet, Take  by mouth., Disp: , Rfl:   •  aspirin (ASPIRIN 81) 81 MG chewable tablet, Chew Daily., Disp: , Rfl:   •  atorvastatin (LIPITOR) 80 MG tablet, Take  by mouth., Disp: , Rfl:   •  clopidogrel (PLAVIX) 75 MG tablet, Take  by mouth Daily., Disp: , Rfl:   •  FLUoxetine (PROZAC) 40 MG capsule, Take 40 mg by mouth Daily., Disp: , Rfl:   •  Gel Base gel, LIDOCAINE 2.22% PRILOCAINE 2.22% LAMOTRIGINE 2.5% MELOXICAM 0.09%. APPLY 1-2 G TO AFFECTED AREA 3-4 TIMES DAILY, Disp: 240 g, Rfl: PRN  •  lisinopril (PRINIVIL,ZESTRIL) 5 MG tablet, Take  by mouth Daily., Disp: , Rfl:   •  ranitidine (ZANTAC) 150 MG capsule, Take  by mouth., Disp: , Rfl:   •  vitamin B-6 (PYRIDOXINE) 50 MG tablet, TAKE 1/2 TAB PO THREE TIMES DAILY, Disp: 45 tablet, Rfl: 5      Allergies   Allergen Reactions   • Codeine GI Intolerance   • Penicillins Hives         /78   Temp 98.1 °F (36.7 °C)   Ht 160 cm (63\")   Wt 78 kg (172 lb)   BMI 30.47 kg/m²       Physical Exam:  Constitutional: Patient is " oriented to person, place, and time.  Appears well-developed and well-nourished.   Head: Normocephalic and atraumatic. Eyes: Conjunctivae and lids are normal. Pupils: Equal, round, and reactive to light.   Neck: Trachea normal. Neck supple. No JVD present.   Lymphatic: No cervical adenopathy  Pulmonary: No increased work of breathing or signs of respiratory distress.  Clear to auscultation bilaterally.   Cardiovascular: Normal rate and rhythm, normal S1 and S2, no murmurs.   Musculoskeletal   Gait and station: Normal   Lumbar spine: The range of motion of the lumbar spine is limited secondary to pain. Extension, flexion, and rotation of the lumbar spine increased and reproduced pain.  Lumbar facet joint loading maneuvers are positive.   Hip joints: The range of motion of the hip joints is full and without pain  Neurological: Patient is alert and oriented to person, place, and time. Speech: speech is normal. Cortical function: Normal mental status.   Cranial nerves: Cranial nerves 2-12 intact.   Reflex Scores:  brachioradialis: 2+ bilaterally  biceps: 2+ bilaterally  triceps: 2+ bilaterally  patellar: 2+ bilaterally  Achilles: 2+ bilaterally  Motor strength: 5/5  Motor Tone: normal tone.   Involuntary movements: none.   Right ankle clonus: absent  Left ankle clonus: absent   Negative long tract signs. Straight leg raising test is negative.   Sensation: No sensory loss. Sensory exam: intact to light touch, intact pain and temperature sensation, intact vibration sensation and normal proprioception.   Coordination: Normal finger to nose and heel to shin. Normal balance and negative. Romberg's sign negative.   Skin and subcutaneous tissue: Skin is warm and intact. No rash noted. No cyanosis.   Psychiatric: Judgment and insight: Normal. Orientation to person, place and time: Normal. Recent and remote memory: Intact. Mood and affect: Normal.     ASSESSMENT:   1. Lumbar stenosis with neurogenic claudication    2. DDD  (degenerative disc disease), lumbar    3. Cervical spondylosis without myelopathy    4. Myofascial pain    5. History of lumbar laminectomy for spinal cord decompression    6. History of coronary artery stent placement      PLAN/MEDICAL DECISION MAKING: Patient has been referred back to this practice by Dr. Pollard for lumbar epidural injections.  I have reviewed all available medical records as well as previous therapies as referenced above, and discussed the patient with Dr. Lopes.  1. Interventional pain management measures: Patient will be scheduled for diagnostic and therapeutic bilateral L3-L4 transforaminal epidural steroid injection.  We may repeat epidural depending on patient's outcome.  Patient will need to stop clopidogrel (Plavix) at least 7 days between last dose and procedure.  We will request clearance for this from Dr. Varinder Alan.  Patient will follow-up with Dr. Pollard thereafter.    2. Pharmacological measures: Reviewed and discussed.  3.  Long-term rehabilitation efforts:   A. Patient will start a comprehensive physical therapy program for water therapy, upper body strengthening/posture correction, gait and balance training, neurodynamics, myofascial release, cupping and dry needling   B. Start an exercise program such as yoga and water therapy  C. Use TENS unit  D. Contrast therapy: Apply ice-packs for 15-20 minutes, followed by heating pads for 15-20 minutes to affected area   4. The patient and her family have been instructed to contact my office with any questions or difficulties. The patient understands the plan and agrees to proceed accordingly.       Patient Care Team:  Yesica Zhu PA as PCP - General  Dionicio Pollard MD as Consulting Physician (Neurosurgery)  Heriberto Lopes MD as Consulting Physician (Pain Medicine)  Yesica Zhu PA as Referring Physician (Physician Assistant)  Varinder Alan MD as Consulting Physician (Cardiology)     No orders of the defined  types were placed in this encounter.        Future Appointments   Date Time Provider Department Center   4/10/2019 11:30 AM Heriberto Lopes MD [facility] None         Cortez Sheets PA-C     EMR Dragon/Transcription disclaimer:  Much of this encounter note is an electronic transcription of spoken language to printed text. Electronic transcription of spoken language may permit erroneous, or at times, nonsensical words or phrases to be inadvertently transcribed. Although I have reviewed the note for such errors, some may still exist.

## 2019-03-13 ENCOUNTER — OFFICE VISIT (OUTPATIENT)
Dept: PAIN MEDICINE | Facility: CLINIC | Age: 74
End: 2019-03-13

## 2019-03-13 VITALS
DIASTOLIC BLOOD PRESSURE: 78 MMHG | SYSTOLIC BLOOD PRESSURE: 136 MMHG | BODY MASS INDEX: 30.48 KG/M2 | HEIGHT: 63 IN | TEMPERATURE: 98.1 F | WEIGHT: 172 LBS

## 2019-03-13 DIAGNOSIS — Z98.890 HISTORY OF LUMBAR LAMINECTOMY FOR SPINAL CORD DECOMPRESSION: ICD-10-CM

## 2019-03-13 DIAGNOSIS — M79.18 MYOFASCIAL PAIN: ICD-10-CM

## 2019-03-13 DIAGNOSIS — M48.062 LUMBAR STENOSIS WITH NEUROGENIC CLAUDICATION: Primary | ICD-10-CM

## 2019-03-13 DIAGNOSIS — M47.812 CERVICAL SPONDYLOSIS WITHOUT MYELOPATHY: ICD-10-CM

## 2019-03-13 DIAGNOSIS — M51.36 DDD (DEGENERATIVE DISC DISEASE), LUMBAR: ICD-10-CM

## 2019-03-13 DIAGNOSIS — Z95.5 HISTORY OF CORONARY ARTERY STENT PLACEMENT: ICD-10-CM

## 2019-03-13 PROCEDURE — 99214 OFFICE O/P EST MOD 30 MIN: CPT | Performed by: PHYSICIAN ASSISTANT

## 2019-04-01 ENCOUNTER — TELEPHONE (OUTPATIENT)
Dept: PAIN MEDICINE | Facility: CLINIC | Age: 74
End: 2019-04-01

## 2019-04-01 NOTE — TELEPHONE ENCOUNTER
Spoke with patient. Reminded her to stop Plavix for 7 days prior to 04/10/2019 procedure. Patient verbalized understanding.

## 2019-04-10 ENCOUNTER — OUTSIDE FACILITY SERVICE (OUTPATIENT)
Dept: PAIN MEDICINE | Facility: CLINIC | Age: 74
End: 2019-04-10

## 2019-04-10 PROCEDURE — 64483 NJX AA&/STRD TFRM EPI L/S 1: CPT | Performed by: ANESTHESIOLOGY

## 2019-04-10 PROCEDURE — 99152 MOD SED SAME PHYS/QHP 5/>YRS: CPT | Performed by: ANESTHESIOLOGY

## 2019-04-11 ENCOUNTER — TELEPHONE (OUTPATIENT)
Dept: PAIN MEDICINE | Facility: CLINIC | Age: 74
End: 2019-04-11

## 2019-04-11 NOTE — TELEPHONE ENCOUNTER
Patient had bilateral L5-S1 TFESI on 04/10/19. Called patient to f/u post procedure. Reached voicemail. Left message for return call

## 2019-05-17 NOTE — PROGRESS NOTES
"Chief Complaint: \"Low back pain.\"     History of Present Illness: Ms. Alysia Aguayo, 72 y.o. female, with a history of chronic neck and lower back pain.  She returns today for post-procedure follow-up.  Patient was last seen on 04/10/2019 for bilateral L3-L4 transforaminal epidural steroid injections and experienced complete relief of her buttock pain that is ongoing.  Unfortunately, she continues to report ongoing lower back pain.  Patient did not participate in Physical Therapy, as medically advised.   Pain description: constant pain with intermittent exacerbation, described as aching, sharp and throbbing sensation.   Radiation of pain:  Low back pain radiated into buttocks bilaterally.  Pain intensity today: 3/10  Average pain intensity last week: 5/10  Pain intensity ranges from: 3/10 to 6/10  Aggravating factors: Low back pain increases with standing and walking.   Alleviating factors: Low back pain decreases with lying down and forward flexion.     Associated symptoms:   Patient denies pain or weakness in the extremities.  Patient reports intermittent numbness in the upper medial aspect of her left leg.   Patient denies any new bladder or bowel problems.   Patient denies difficulties with her balance or recent falls.      Review of previous therapies and additional medical records:  Alysia Aguayo has already failed the following measures, including:   Conservative measures: oral analgesics, physical therapy, ice and heat   Interventional measures: As referenced above.  Right C5-C6 and C6-C7 facet joint injections + TPI's at right levator scapulae and right trapezius on 03/07/2018 and 07/22/2015.   Surgical measures: None  Alysia Aguayo underwent neurosurgical consultation with Dr. Dionicio Pollard on 03/06/2019, and was found to be a potential surgical candidate for decompressive laminectomies at L3-4 and L4-5.  She was found not to be a surgical candidate for her neck pain.  Alysia Aguayo presents with " significant comorbidities including depression, hypertension, hyperlipidemia, coronary artery disease with anticoagulation therapy; engaged in treatment. Patient takes Plavix daily, prescribed by Dr. Bloom due to placement of 3 stents in the heart. She reports discontinuing her anticoagulant therapy in the past to undergo procedures.  In terms of current analgesics, Alysia Aguayo takes: Tylenol  I have reviewed Karthik Report #[id number], consistent to medication reconciliation.    Global Pain Scale 08-21-18 05-20-19         Pain 15 12         Feelings 0 0         Clinical outcomes 3 3         Activities 9 0         GPS Total: 27 15           Review of Diagnostic Studies:    XR SPINE, LUMBAR, AP AND LATERAL WITH FLEXION AND EXTENSION-03/06/2019: Extensive degenerative changes seen at the L1/L2 level.  Slight scoliotic curvature with convexity to the right. Alignment stable in flexion and extension.  MRI Cervical Spine w/o Contrast- 02/28/2019: Degenerative changes throughout the cervical spine, particularly the lower half where there is at least mild central canal stenosis with mild cord impingement and advanced bilateral neural foraminal narrowing.  MRI Lumbar Spine w/o Contrast- 02/28/2019:   1. Moderate to advance multilevel degenerative disc disease with multilevel areas of moderate to advanced central canal stenosis and subarticular recess narrowing most severe at the L2-3, L3-4, and L4-5 levels.  2. Foraminal protrusion at the L3-4 level on the left with contact exiting left L3 nerve root.    Review of Systems   Musculoskeletal: Positive for back pain, neck pain and neck stiffness.   Neurological: Positive for headaches.   All other systems reviewed and are negative.     Patient Active Problem List   Diagnosis   • Cervical spondylosis without myelopathy   • Myofascial pain   • Frequent headaches   • History of alcohol abuse   • History of coronary artery stent placement   • History of lumbar laminectomy for  "spinal cord decompression       Past Medical History:   Diagnosis Date   • Heart disease    • Kidney disease    • Neck pain          Past Surgical History:   Procedure Laterality Date   • CHOLECYSTECTOMY     • HYSTERECTOMY     • LUMBAR DISCECTOMY  07/10/2014    Rt- L1/2 Dr. Dionicio Pollard    • TUBAL ABDOMINAL LIGATION           Family History   Problem Relation Age of Onset   • Heart disease Mother    • Emphysema Mother    • Cancer Father          Social History     Socioeconomic History   • Marital status:      Spouse name: Not on file   • Number of children: Not on file   • Years of education: Not on file   • Highest education level: Not on file   Tobacco Use   • Smoking status: Former Smoker   • Smokeless tobacco: Former User     Quit date: 4/17/2009   Substance and Sexual Activity   • Alcohol use: No   • Drug use: No   • Sexual activity: Defer           Current Outpatient Medications:   •  acetaminophen (TYLENOL) 325 MG tablet, Take  by mouth., Disp: , Rfl:   •  aspirin (ASPIRIN 81) 81 MG chewable tablet, Chew Daily., Disp: , Rfl:   •  atorvastatin (LIPITOR) 80 MG tablet, Take  by mouth., Disp: , Rfl:   •  clopidogrel (PLAVIX) 75 MG tablet, Take  by mouth Daily., Disp: , Rfl:   •  FLUoxetine (PROZAC) 40 MG capsule, Take 40 mg by mouth Daily., Disp: , Rfl:   •  lisinopril (PRINIVIL,ZESTRIL) 5 MG tablet, Take  by mouth Daily., Disp: , Rfl:   •  ranitidine (ZANTAC) 150 MG capsule, Take  by mouth., Disp: , Rfl:   •  tiZANidine (ZANAFLEX) 2 MG tablet, , Disp: , Rfl: 5      Allergies   Allergen Reactions   • Codeine GI Intolerance   • Penicillins Hives         /70   Pulse 73   Temp 98.3 °F (36.8 °C) (Temporal)   Resp 18   Ht 160 cm (63\")   Wt 76.8 kg (169 lb 6.4 oz)   SpO2 98%   BMI 30.01 kg/m²       Physical Exam:  Constitutional: Patient is oriented to person, place, and time.  Appears well-developed and well-nourished.   Head: Normocephalic and atraumatic. Eyes: Conjunctivae and lids are normal. " Pupils: Equal, round, and reactive to light.   Neck: Trachea normal. Neck supple. No JVD present.   Lymphatic: No cervical adenopathy  Pulmonary: No increased work of breathing or signs of respiratory distress.  Clear to auscultation bilaterally.   Cardiovascular: Normal rate and rhythm, normal S1 and S2, no murmurs.   Musculoskeletal   Gait and station: Normal   Lumbar spine: The range of motion of the lumbar spine is limited secondary to pain. Extension, flexion, and rotation of the lumbar spine increased and reproduced pain.  Lumbar facet joint loading maneuvers are positive.   Hip joints: The range of motion of the hip joints is full and without pain  Neurological: Patient is alert and oriented to person, place, and time. Speech: speech is normal. Cortical function: Normal mental status.   Cranial nerves: Cranial nerves 2-12 intact.   Reflex Scores:  brachioradialis: 2+ bilaterally  biceps: 2+ bilaterally  triceps: 2+ bilaterally  patellar: 2+ bilaterally  Achilles: 2+ bilaterally  Motor strength: 5/5  Motor Tone: normal tone.   Involuntary movements: none.   Right ankle clonus: absent  Left ankle clonus: absent   Negative long tract signs. Straight leg raising test is negative.   Sensation: No sensory loss. Sensory exam: intact to light touch, intact pain and temperature sensation, intact vibration sensation and normal proprioception.   Coordination: Normal finger to nose and heel to shin. Normal balance and negative. Romberg's sign negative.   Skin and subcutaneous tissue: Skin is warm and intact. No rash noted. No cyanosis.   Psychiatric: Judgment and insight: Normal. Orientation to person, place and time: Normal. Recent and remote memory: Intact. Mood and affect: Normal.     ASSESSMENT:   1. Lumbar stenosis with neurogenic claudication    2. History of lumbar laminectomy for spinal cord decompression    3. DDD (degenerative disc disease), lumbar    4. Cervical spondylosis without myelopathy    5. Myofascial  pain    6. History of coronary artery stent placement      PLAN/MEDICAL DECISION MAKING: I have reviewed all available medical records as well as previous therapies as referenced above, and discussed the patient with Dr. Lopes.  1. Interventional pain management measures: Patient will be scheduled for repeat bilateral L3-L4 transforaminal epidural steroid injection.  Patient will need to stop clopidogrel (Plavix) at least 7 days between last dose and procedure.  We will request clearance for this from Dr. Varinder Alan.  Patient will follow-up with Dr. Pollard thereafter.    2. Pharmacological measures: Reviewed and discussed.  3.  Long-term rehabilitation efforts:   A. Patient will start a comprehensive physical therapy program for water therapy, upper body strengthening/posture correction, gait and balance training, neurodynamics, myofascial release, cupping and dry needling   B. Start an exercise program such as yoga and water therapy  C. Use TENS unit  D. Contrast therapy: Apply ice-packs for 15-20 minutes, followed by heating pads for 15-20 minutes to affected area   4. The patient and her family have been instructed to contact my office with any questions or difficulties. The patient understands the plan and agrees to proceed accordingly.       Patient Care Team:  Yesica Zhu PA as PCP - General  Dionicio Pollard MD as Consulting Physician (Neurosurgery)  Heriberto Lopes MD as Consulting Physician (Pain Medicine)  Varinder Alan MD as Consulting Physician (Cardiology)  Cortez Sheets PA-C as Physician Assistant (Physician Assistant)     No orders of the defined types were placed in this encounter.        Future Appointments   Date Time Provider Department [place]   6/17/2019 11:15 AM Heriberto Lopes MD [facility] None         Cortez Sheets PA-C     EMR Dragon/Transcription disclaimer:  Much of this encounter note is an electronic transcription of spoken language to printed text. Electronic  transcription of spoken language may permit erroneous, or at times, nonsensical words or phrases to be inadvertently transcribed. Although I have reviewed the note for such errors, some may still exist.

## 2019-05-20 ENCOUNTER — OFFICE VISIT (OUTPATIENT)
Dept: PAIN MEDICINE | Facility: CLINIC | Age: 74
End: 2019-05-20

## 2019-05-20 VITALS
HEIGHT: 63 IN | OXYGEN SATURATION: 98 % | TEMPERATURE: 98.3 F | HEART RATE: 73 BPM | WEIGHT: 169.4 LBS | DIASTOLIC BLOOD PRESSURE: 70 MMHG | SYSTOLIC BLOOD PRESSURE: 120 MMHG | RESPIRATION RATE: 18 BRPM | BODY MASS INDEX: 30.02 KG/M2

## 2019-05-20 DIAGNOSIS — M51.36 DDD (DEGENERATIVE DISC DISEASE), LUMBAR: ICD-10-CM

## 2019-05-20 DIAGNOSIS — Z98.890 HISTORY OF LUMBAR LAMINECTOMY FOR SPINAL CORD DECOMPRESSION: ICD-10-CM

## 2019-05-20 DIAGNOSIS — M79.18 MYOFASCIAL PAIN: ICD-10-CM

## 2019-05-20 DIAGNOSIS — M47.812 CERVICAL SPONDYLOSIS WITHOUT MYELOPATHY: ICD-10-CM

## 2019-05-20 DIAGNOSIS — Z95.5 HISTORY OF CORONARY ARTERY STENT PLACEMENT: ICD-10-CM

## 2019-05-20 DIAGNOSIS — M48.062 LUMBAR STENOSIS WITH NEUROGENIC CLAUDICATION: Primary | ICD-10-CM

## 2019-05-20 PROCEDURE — 99214 OFFICE O/P EST MOD 30 MIN: CPT | Performed by: PHYSICIAN ASSISTANT

## 2019-05-20 RX ORDER — TIZANIDINE 2 MG/1
TABLET ORAL
Refills: 5 | COMMUNITY
Start: 2019-04-29 | End: 2019-10-07 | Stop reason: SDUPTHER

## 2019-06-05 ENCOUNTER — TELEPHONE (OUTPATIENT)
Dept: PAIN MEDICINE | Facility: CLINIC | Age: 74
End: 2019-06-05

## 2019-06-05 NOTE — TELEPHONE ENCOUNTER
LVM reminding patient to hold her Plavix for 7 days prior to her procedure on June 17.   Direct extension given for call back

## 2019-06-17 ENCOUNTER — OUTSIDE FACILITY SERVICE (OUTPATIENT)
Dept: PAIN MEDICINE | Facility: CLINIC | Age: 74
End: 2019-06-17

## 2019-06-17 PROCEDURE — 99152 MOD SED SAME PHYS/QHP 5/>YRS: CPT | Performed by: ANESTHESIOLOGY

## 2019-06-17 PROCEDURE — 64483 NJX AA&/STRD TFRM EPI L/S 1: CPT | Performed by: ANESTHESIOLOGY

## 2019-06-18 ENCOUNTER — TELEPHONE (OUTPATIENT)
Dept: PAIN MEDICINE | Facility: CLINIC | Age: 74
End: 2019-06-18

## 2019-06-18 NOTE — TELEPHONE ENCOUNTER
Patient had repeat bilateral L3-L4 TFESI on 06/17/19. Called patient to f/u post procedure. Reached voicemail. Left message for return call

## 2019-07-17 NOTE — PROGRESS NOTES
"Chief Complaint: \"The epidurals helped my back.  I want Dr. Lopes to work on my neck again.\"     History of Present Illness: Ms. Alysia Aguayo, 72 y.o. female, with a history of chronic neck and lower back pain.  She returns today for post-procedure follow-up and re-evaluation.  Patient was last seen on 06/17/2019 for repeat bilateral L3-L4 transforaminal epidural steroid injections and experienced 50-60% relief that is ongoing.  Patient did not participate in Physical Therapy, as medically advised.  Patient was previously seen on 08/27/2018 for bilateral C5-C6 and bilateral C6-C7 facet joint injections and experienced about 90% relief that lasted for almost 11 months.  The pain is gradually recurring.  Pain description: previously constant pain with intermittent exacerbation, described as aching, sharp and throbbing sensation.   Radiation of pain:  Low back pain radiated into buttocks bilaterally.  The neck pain radiates into the shoulder blades.  Pain intensity today: 4/10  Average pain intensity last week: 4/10  Pain intensity ranges from: 4/10 to 7/10  Aggravating factors: Low back pain increases with standing and walking.   Neck pain increases with flexion, extension and rotation of the cervical spine   Alleviating factors: Low back pain decreases with lying down and forward flexion.     Associated symptoms:   Patient denies pain or weakness in the extremities.  Patient reports intermittent numbness in the upper medial aspect of her left leg.  Patient denies pain, numbness or weakness in the upper extremities.   Patient denies any new bladder or bowel problems.   Patient denies difficulties with her balance or recent falls.      Review of previous therapies and additional medical records:  Alysia Aguayo has already failed the following measures, including:   Conservative measures: oral analgesics, physical therapy, ice and heat   Interventional measures: As referenced above.  Bilateral L3-L4 TESI on " 04/10/2019.  Right C5-C6 and C6-C7 facet joint injections + TPI's at right levator scapulae and right trapezius on 03/07/2018 and 07/22/2015.   Surgical measures: None  Alysia Aguayo underwent neurosurgical consultation with Dr. Dionicio Pollard on 03/06/2019, and was found to be a potential surgical candidate for decompressive laminectomies at L3-4 and L4-5.  She was found not to be a surgical candidate for her neck pain.  Alysia Aguayo presents with significant comorbidities including depression, hypertension, hyperlipidemia, coronary artery disease with anticoagulation therapy; engaged in treatment. Patient takes Plavix daily, prescribed by Dr. Bloom due to placement of 3 stents in the heart. She reports discontinuing her anticoagulant therapy in the past to undergo procedures.  In terms of current analgesics, Alysia Aguayo takes: Tylenol  I have reviewed Karthik Report #[id number], consistent to medication reconciliation.    Global Pain Scale 08-21-18 05-20-19 07-18-19        Pain 15 12 13        Feelings 0 0 2        Clinical outcomes 3 3 1        Activities 9 0 2        GPS Total: 27 15 18          Review of Diagnostic Studies:    XR SPINE, LUMBAR, AP AND LATERAL WITH FLEXION AND EXTENSION-03/06/2019: Extensive degenerative changes seen at the L1/L2 level.  Slight scoliotic curvature with convexity to the right. Alignment stable in flexion and extension.  MRI Cervical Spine w/o Contrast- 02/28/2019: Degenerative changes throughout the cervical spine, particularly the lower half where there is at least mild central canal stenosis with mild cord impingement and advanced bilateral neural foraminal narrowing.  MRI Lumbar Spine w/o Contrast- 02/28/2019:   1. Moderate to advance multilevel degenerative disc disease with multilevel areas of moderate to advanced central canal stenosis and subarticular recess narrowing most severe at the L2-3, L3-4, and L4-5 levels.  2. Foraminal protrusion at the L3-4 level on the  left with contact exiting left L3 nerve root.    Review of Systems   Musculoskeletal: Positive for back pain, gait problem, neck pain and neck stiffness.   All other systems reviewed and are negative.     Patient Active Problem List   Diagnosis   • Cervical spondylosis without myelopathy   • Myofascial pain   • Frequent headaches   • History of alcohol abuse   • History of coronary artery stent placement   • History of lumbar laminectomy for spinal cord decompression       Past Medical History:   Diagnosis Date   • Heart disease    • Kidney disease    • Neck pain          Past Surgical History:   Procedure Laterality Date   • CHOLECYSTECTOMY     • HYSTERECTOMY     • LUMBAR DISCECTOMY  07/10/2014    Rt- L1/2 Dr. Dionicio Pollard    • TUBAL ABDOMINAL LIGATION           Family History   Problem Relation Age of Onset   • Heart disease Mother    • Emphysema Mother    • Cancer Father          Social History     Socioeconomic History   • Marital status:      Spouse name: Not on file   • Number of children: Not on file   • Years of education: Not on file   • Highest education level: Not on file   Tobacco Use   • Smoking status: Former Smoker   • Smokeless tobacco: Former User     Quit date: 4/17/2009   Substance and Sexual Activity   • Alcohol use: No   • Drug use: No   • Sexual activity: Defer           Current Outpatient Medications:   •  acetaminophen (TYLENOL) 325 MG tablet, Take  by mouth., Disp: , Rfl:   •  aspirin (ASPIRIN 81) 81 MG chewable tablet, Chew Daily., Disp: , Rfl:   •  atorvastatin (LIPITOR) 80 MG tablet, Take  by mouth., Disp: , Rfl:   •  clopidogrel (PLAVIX) 75 MG tablet, Take  by mouth Daily., Disp: , Rfl:   •  FLUoxetine (PROZAC) 40 MG capsule, Take 40 mg by mouth Daily., Disp: , Rfl:   •  lisinopril (PRINIVIL,ZESTRIL) 5 MG tablet, Take  by mouth Daily., Disp: , Rfl:   •  ranitidine (ZANTAC) 150 MG capsule, Take  by mouth., Disp: , Rfl:   •  tiZANidine (ZANAFLEX) 2 MG tablet, , Disp: , Rfl: 5     "  Allergies   Allergen Reactions   • Codeine GI Intolerance   • Penicillins Hives         /62 (BP Location: Left arm, Patient Position: Sitting)   Temp 98.7 °F (37.1 °C) (Temporal)   Ht 160 cm (63\")   Wt 75.8 kg (167 lb)   BMI 29.58 kg/m²       Physical Exam:  Constitutional: Patient is oriented to person, place, and time.  Appears well-developed and well-nourished.   Head: Normocephalic and atraumatic. Eyes: Conjunctivae and lids are normal. Pupils: Equal, round, and reactive to light.   Neck: Trachea normal. Neck supple. No JVD present.   Lymphatic: No cervical adenopathy  Pulmonary: No increased work of breathing or signs of respiratory distress.  Clear to auscultation bilaterally.   Cardiovascular: Normal rate and rhythm, normal S1 and S2, no murmurs.   Musculoskeletal   Gait and station: Normal   Cervical spine: Passive and active range of motion are limited secondary to pain. Extension, lateral flexion, rotation of the cervical spine increased and reproduced pain. Cervical facet joint loading maneuvers are positive.  Muscles: Presence of active trigger points at the levator scapulae and trapezius muscles  Shoulders: The range of motion of the glenohumeral joints is full and without pain. Rotator cuff strength is 5/5.   Lumbar spine: The range of motion of the lumbar spine is almost full. Extension, flexion, and rotation of the lumbar spine increased and reproduced pain.  Lumbar facet joint loading maneuvers are positive.   Hip joints: The range of motion of the hip joints is full and without pain  Neurological: Patient is alert and oriented to person, place, and time. Speech: speech is normal. Cortical function: Normal mental status.   Cranial nerves: Cranial nerves 2-12 intact.   Reflex Scores:  brachioradialis: 2+ bilaterally  biceps: 2+ bilaterally  triceps: 2+ bilaterally  patellar: 2+ bilaterally  Achilles: 2+ bilaterally  Motor strength: 5/5  Motor Tone: normal tone.   Involuntary movements: " none.   Right ankle clonus: absent  Left ankle clonus: absent   Spurling sign is negative. Lhermitte sign is negative.  Negative long tract signs. Straight leg raising test is negative.   Sensation: No sensory loss. Sensory exam: intact to light touch, intact pain and temperature sensation, intact vibration sensation and normal proprioception.   Coordination: Normal finger to nose and heel to shin. Normal balance and negative. Romberg's sign negative.   Skin and subcutaneous tissue: Skin is warm and intact. No rash noted. No cyanosis.   Psychiatric: Judgment and insight: Normal. Orientation to person, place and time: Normal. Recent and remote memory: Intact. Mood and affect: Normal.     ASSESSMENT:   1. Cervical spondylosis without myelopathy    2. Myofascial pain    3. Lumbar stenosis with neurogenic claudication    4. History of lumbar laminectomy for spinal cord decompression    5. DDD (degenerative disc disease), lumbar    6. History of coronary artery stent placement      PLAN/MEDICAL DECISION MAKING: I have reviewed all available medical records as well as previous therapies as referenced above, and discussed the patient with Dr. Lopes.  1. Interventional pain management measures: Patient will be scheduled for bilateral cervical facet joint injections at C5-C6, C6-C7 combined with trigger point injections at the bilateral levator scapulae and trapezius.  If patient fails to obtain sustained pain relief, we will proceed with diagnostic bilateral cervical medial branch blocks at C4, C5, C6; for bilateral cervical facet joints at C5-C6, C6-C7 to clarify the origin of her pain.  If patient experiences more than 80% pain relief along with significant improvement in the range of motion of the cervical spine, she will be scheduled for a second set of diagnostic bilateral cervical medial branch blocks in order to proceed with cervical medial branch rhizotomies.  2. Pharmacological measures: Reviewed and  discussed.  3.  Long-term rehabilitation efforts:   A. Patient will start a comprehensive physical therapy program for water therapy, upper body strengthening/posture correction, gait and balance training, neurodynamics, myofascial release, cupping and dry needling   B. Start an exercise program such as yoga and water therapy  C. Use TENS unit  D. Contrast therapy: Apply ice-packs for 15-20 minutes, followed by heating pads for 15-20 minutes to affected area   4. The patient and her family have been instructed to contact my office with any questions or difficulties. The patient understands the plan and agrees to proceed accordingly.       Patient Care Team:  Yesica Zhu PA as PCP - General  Dionicio Pollard MD as Consulting Physician (Neurosurgery)  Heriberto Lopes MD as Consulting Physician (Pain Medicine)  Varinder Alan MD as Consulting Physician (Cardiology)  Cortez Sheets PA-C as Physician Assistant (Physician Assistant)     No orders of the defined types were placed in this encounter.        Future Appointments   Date Time Provider Department [facility]   7/24/2019 11:00 AM Heriberto Lopes MD [facility] None         Cortez Sheets PA-C     EMR Dragon/Transcription disclaimer:  Much of this encounter note is an electronic transcription of spoken language to printed text. Electronic transcription of spoken language may permit erroneous, or at times, nonsensical words or phrases to be inadvertently transcribed. Although I have reviewed the note for such errors, some may still exist.

## 2019-07-18 ENCOUNTER — OFFICE VISIT (OUTPATIENT)
Dept: PAIN MEDICINE | Facility: CLINIC | Age: 74
End: 2019-07-18

## 2019-07-18 VITALS
HEIGHT: 63 IN | DIASTOLIC BLOOD PRESSURE: 62 MMHG | WEIGHT: 167 LBS | TEMPERATURE: 98.7 F | BODY MASS INDEX: 29.59 KG/M2 | SYSTOLIC BLOOD PRESSURE: 120 MMHG

## 2019-07-18 DIAGNOSIS — Z95.5 HISTORY OF CORONARY ARTERY STENT PLACEMENT: ICD-10-CM

## 2019-07-18 DIAGNOSIS — M47.812 CERVICAL SPONDYLOSIS WITHOUT MYELOPATHY: Primary | ICD-10-CM

## 2019-07-18 DIAGNOSIS — M48.062 LUMBAR STENOSIS WITH NEUROGENIC CLAUDICATION: ICD-10-CM

## 2019-07-18 DIAGNOSIS — M51.36 DDD (DEGENERATIVE DISC DISEASE), LUMBAR: ICD-10-CM

## 2019-07-18 DIAGNOSIS — Z98.890 HISTORY OF LUMBAR LAMINECTOMY FOR SPINAL CORD DECOMPRESSION: ICD-10-CM

## 2019-07-18 DIAGNOSIS — M79.18 MYOFASCIAL PAIN: ICD-10-CM

## 2019-07-18 PROCEDURE — 99214 OFFICE O/P EST MOD 30 MIN: CPT | Performed by: PHYSICIAN ASSISTANT

## 2019-07-24 ENCOUNTER — OUTSIDE FACILITY SERVICE (OUTPATIENT)
Dept: PAIN MEDICINE | Facility: CLINIC | Age: 74
End: 2019-07-24

## 2019-07-24 PROCEDURE — 64491 INJ PARAVERT F JNT C/T 2 LEV: CPT | Performed by: ANESTHESIOLOGY

## 2019-07-24 PROCEDURE — 99152 MOD SED SAME PHYS/QHP 5/>YRS: CPT | Performed by: ANESTHESIOLOGY

## 2019-07-24 PROCEDURE — 64490 INJ PARAVERT F JNT C/T 1 LEV: CPT | Performed by: ANESTHESIOLOGY

## 2019-07-25 ENCOUNTER — TELEPHONE (OUTPATIENT)
Dept: PAIN MEDICINE | Facility: CLINIC | Age: 74
End: 2019-07-25

## 2019-07-25 NOTE — TELEPHONE ENCOUNTER
Patient had dx/tx bilateral cervical facet joint injection and trigger point injections on 07/24/2019. Spoke with patient. She reports that she feels so much better. She does have some soreness

## 2019-08-05 ENCOUNTER — TELEPHONE (OUTPATIENT)
Dept: PAIN MEDICINE | Facility: CLINIC | Age: 74
End: 2019-08-05

## 2019-08-05 NOTE — TELEPHONE ENCOUNTER
Called patient and let her know  recommendations. Pt already has muscle relaxer's and does not need any more. She will keep her f/u scheduled with us  ----- Message from Heriberto Lopes MD sent at 8/5/2019 10:51 AM EDT -----  It is too soon to do anything.     Ice, heat, PT, stretching NSAIDs/Tylenol (if no CI)  If she needs a muscle relaxer; we can Rx the usual    ----- Message -----  From: Shiloh Guillory  Sent: 8/5/2019  10:25 AM  To: Heriberto Lopes MD    Patient was last seen in the office on 7/18 by kobe , on 7/24 she had dx/thera tari cervical facet joint inj and got good relief from the procedure but she is still having a lot of pain in one area on   left side of her neck where the neck and shoulder meet. The pain radiates up into the back of the neck into the back of the head.  She states that any time she moves her neck it makes the pain worse.  She is having headaches in back of the head lasting until she lays down.  She is using ice and heat but doesn't feel like it helps.      poc states, f/u in 8 wks. We may repeat tari cerv facet joint inj with tpi. If patient fails to obtain sustained pain relief we will proceed with dx tari cervical medial branch blocks and RFTC     Please advise

## 2019-08-19 ENCOUNTER — TELEPHONE (OUTPATIENT)
Dept: NEUROSURGERY | Facility: CLINIC | Age: 74
End: 2019-08-19

## 2019-08-19 DIAGNOSIS — M47.816 FACET ARTHRITIS OF LUMBAR REGION: ICD-10-CM

## 2019-08-19 DIAGNOSIS — M53.2X6 LUMBAR SPINE INSTABILITY: Primary | ICD-10-CM

## 2019-08-19 DIAGNOSIS — M48.062 SPINAL STENOSIS OF LUMBAR REGION WITH NEUROGENIC CLAUDICATION: ICD-10-CM

## 2019-08-19 NOTE — TELEPHONE ENCOUNTER
Pt called in today to schedule an appointment after her pain management injections.  According to the patient the relief is not lasting long enough so she would like to discuss sx with Atul.  In his note he mentions getting flexion-extension lumbar spine x-rays prior to this follow up.  Please pend order and I will call patient to schedule.

## 2019-08-20 NOTE — TELEPHONE ENCOUNTER
Pt called back again today to discuss scheduling.  Please pend order and I will call her to schedule.

## 2019-08-21 NOTE — TELEPHONE ENCOUNTER
New XR has been cancelled. These images were obtained in March after her last visit. OK to scheduled with Atul, no new studies needed. Please stress the importance of bringing her old MRI with her to the visit.

## 2019-08-23 NOTE — TELEPHONE ENCOUNTER
Pt has been scheduled for an appointment with [facility] on 9/4.  She is aware to bring her old MRI disk with her to the appointment.

## 2019-08-23 NOTE — TELEPHONE ENCOUNTER
Pt called back after scheduling her appointment to ask if we had anything sooner.  She reports having fallen on Saturday and she is in pain.  Please advise.

## 2019-08-26 NOTE — TELEPHONE ENCOUNTER
I called and let the pt know, via VM this is the earliest we have available. She can be added to the the cancellation list.

## 2019-08-27 ENCOUNTER — PREP FOR SURGERY (OUTPATIENT)
Dept: OTHER | Facility: HOSPITAL | Age: 74
End: 2019-08-27

## 2019-08-27 ENCOUNTER — OFFICE VISIT (OUTPATIENT)
Dept: NEUROSURGERY | Facility: CLINIC | Age: 74
End: 2019-08-27

## 2019-08-27 VITALS — BODY MASS INDEX: 29.23 KG/M2 | HEIGHT: 63 IN | WEIGHT: 165 LBS | TEMPERATURE: 98.3 F | RESPIRATION RATE: 16 BRPM

## 2019-08-27 DIAGNOSIS — M48.062 SPINAL STENOSIS OF LUMBAR REGION WITH NEUROGENIC CLAUDICATION: Primary | ICD-10-CM

## 2019-08-27 DIAGNOSIS — M47.816 FACET ARTHRITIS OF LUMBAR REGION: ICD-10-CM

## 2019-08-27 DIAGNOSIS — M43.16 SPONDYLOLISTHESIS OF LUMBAR REGION: ICD-10-CM

## 2019-08-27 DIAGNOSIS — M48.062 LUMBAR STENOSIS WITH NEUROGENIC CLAUDICATION: Primary | ICD-10-CM

## 2019-08-27 PROCEDURE — 99213 OFFICE O/P EST LOW 20 MIN: CPT | Performed by: NEUROLOGICAL SURGERY

## 2019-08-27 RX ORDER — VANCOMYCIN HYDROCHLORIDE 1 G/200ML
15 INJECTION, SOLUTION INTRAVENOUS ONCE
Status: CANCELLED | OUTPATIENT
Start: 2019-08-27 | End: 2019-08-27

## 2019-08-27 RX ORDER — OXYCODONE HCL 10 MG/1
10 TABLET, FILM COATED, EXTENDED RELEASE ORAL ONCE
Status: CANCELLED | OUTPATIENT
Start: 2019-08-27 | End: 2019-08-27

## 2019-08-27 RX ORDER — IBUPROFEN 800 MG/1
800 TABLET ORAL ONCE
Status: CANCELLED | OUTPATIENT
Start: 2019-08-27 | End: 2019-08-27

## 2019-08-27 RX ORDER — FAMOTIDINE 20 MG/1
20 TABLET, FILM COATED ORAL
Status: CANCELLED | OUTPATIENT
Start: 2019-08-27

## 2019-08-27 RX ORDER — ACETAMINOPHEN 500 MG
1000 TABLET ORAL ONCE
Status: CANCELLED | OUTPATIENT
Start: 2019-08-27 | End: 2019-08-27

## 2019-08-27 NOTE — H&P
Patient: Alysia Aguayo  : 1945     Primary Care Provider: Yesica Zhu PA     Requesting Provider: As above           History     Chief Complaint:   1.  Low back and bilateral buttock pain with walking and standing intolerance.  2.  Chronic neck pain.     History of Present Illness: Ms. Aguayo is a 73-year-old unemployed woman who is known to my service.  On 7/10/14 she underwent right L1-2 discectomy and did well.  She has chronically had neck pain.  She does undergo intermittent injections in her neck by Dr. Lopes.  She has no arm symptoms.  Her main complaint is low back pain that extends into her buttocks.  Her legs feel heavy with walking.  Intermittently she gets numbness in the upper medial aspect of her left leg.  Her back difficulties are better lying down and when flexing forward at the waist.  She is worse with standing and walking.  She denies bowel or bladder dysfunction.  Injections have not been particularly helpful.  She recently suffered a fall and has been worse since then.  That occurred a couple of weeks ago.     Review of Systems   Constitutional: Negative for activity change, appetite change, chills, diaphoresis, fatigue, fever and unexpected weight change.   HENT: Negative for congestion, dental problem, drooling, ear discharge, ear pain, facial swelling, hearing loss, mouth sores, nosebleeds, postnasal drip, rhinorrhea, sinus pressure, sneezing, sore throat, tinnitus, trouble swallowing and voice change.    Eyes: Negative for photophobia, pain, discharge, redness, itching and visual disturbance.   Respiratory: Negative for apnea, cough, choking, chest tightness, shortness of breath, wheezing and stridor.    Cardiovascular: Negative for chest pain, palpitations and leg swelling.   Gastrointestinal: Negative for abdominal distention, abdominal pain, anal bleeding, blood in stool, constipation, diarrhea, nausea, rectal pain and vomiting.   Endocrine: Negative for cold  intolerance, heat intolerance, polydipsia, polyphagia and polyuria.   Genitourinary: Negative for decreased urine volume, difficulty urinating, dysuria, enuresis, flank pain, frequency, genital sores, hematuria and urgency.   Musculoskeletal: Positive for arthralgias, back pain, gait problem, myalgias, neck pain and neck stiffness. Negative for joint swelling.   Skin: Negative for color change, pallor, rash and wound.   Allergic/Immunologic: Negative for environmental allergies, food allergies and immunocompromised state.   Neurological: Negative for dizziness, tremors, seizures, syncope, facial asymmetry, speech difficulty, weakness, light-headedness, numbness and headaches.   Hematological: Negative for adenopathy. Does not bruise/bleed easily.   Psychiatric/Behavioral: Negative for agitation, behavioral problems, confusion, decreased concentration, dysphoric mood, hallucinations, self-injury, sleep disturbance and suicidal ideas. The patient is not nervous/anxious and is not hyperactive.    All other systems reviewed and are negative.        The patient's past medical history, past surgical history, family history, and social history have been reviewed at length in the electronic medical record.     Past Medical History:   Diagnosis Date   • Heart disease    • Kidney disease    • Neck pain      Past Surgical History:   Procedure Laterality Date   • CHOLECYSTECTOMY     • HYSTERECTOMY     • LUMBAR DISCECTOMY  07/10/2014    Rt- L1/2 Dr. Dionicio Pollard    • TUBAL ABDOMINAL LIGATION       Family History   Problem Relation Age of Onset   • Heart disease Mother    • Emphysema Mother    • Cancer Father      Social History     Socioeconomic History   • Marital status:      Spouse name: Not on file   • Number of children: Not on file   • Years of education: Not on file   • Highest education level: Not on file   Tobacco Use   • Smoking status: Former Smoker   • Smokeless tobacco: Former User     Quit date: 4/17/2009  "  Substance and Sexual Activity   • Alcohol use: No   • Drug use: No   • Sexual activity: Defer     Allergies   Allergen Reactions   • Codeine GI Intolerance   • Penicillins Hives       Physical Exam:   Temp 98.3 °F (36.8 °C) (Temporal)   Resp 16   Ht 160 cm (63\")   Wt 74.8 kg (165 lb)   BMI 29.23 kg/m²   MUSCULOSKELETAL:  Straight leg raising is negative.  Harry's Sign is negative.  ROM in back normal.  Tenderness in the back to palpation is not observed.  NEUROLOGICAL:  Strength is intact in the lower extremities to direct testing.  Muscle tone is normal throughout.  Station and gait are normal.  Sensation is intact to light touch testing throughout.  Deep tendon reflexes are 2+ and symmetrical except at the ankles were reflexes are trace.  Anupam signs are negative.  Coordination is intact.        Medical Decision Making     Data Review:   Flexion-extension films reveal a couple of millimeters of movement at L4-5.     Cervical MRI dated 2/28/19 demonstrates diffuse spondylosis.  There is a low-grade offset of C5 on C6.  Lumbar MRI from the same date demonstrates laminotomy defect on the right at L1-2.  Disc protrusion into the recess and foramen on the left is noted at L2-3.  There is moderate to generous stenosis at L3-4 and L4-5.  There is some degree of disc protrusion more leftward at L3-4.        Diagnosis:   1.  Lumbar stenosis with neurogenic claudication.  2.  Mechanical low back pain.  3.  Cervical spondylosis with mechanical neck pain.     Treatment Options:   Given her significant difficulties I recommended decompressive laminectomies at L3-4 and L4-5.  This is likely to help with her claudication symptoms but it is not a panacea for all of her mechanical neck and back pain.  Formal cardiac clearance will be required.  She may continue her aspirin but I would like her off of the Plavix in the perioperative period.  The nature of the procedure as well as the potential risks, complications, " limitations, and alternatives to the procedure were discussed at length with the patient and the patient has agreed to proceed with surgery.          Diagnosis Plan   1. Spinal stenosis of lumbar region with neurogenic claudication      2. Facet arthritis of lumbar region (CMS/HCC)      3. Spondylolisthesis of lumbar region

## 2019-08-27 NOTE — PROGRESS NOTES
Patient: Alysia Aguayo  : 1945    Primary Care Provider: Yesica Zhu PA    Requesting Provider: As above        History    Chief Complaint:   1.  Low back and bilateral buttock pain with walking and standing intolerance.  2.  Chronic neck pain.    History of Present Illness: Ms. Aguayo is a 73-year-old unemployed woman who is known to my service.  On 7/10/14 she underwent right L1-2 discectomy and did well.  She has chronically had neck pain.  She does undergo intermittent injections in her neck by Dr. Lopes.  She has no arm symptoms.  Her main complaint is low back pain that extends into her buttocks.  Her legs feel heavy with walking.  Intermittently she gets numbness in the upper medial aspect of her left leg.  Her back difficulties are better lying down and when flexing forward at the waist.  She is worse with standing and walking.  She denies bowel or bladder dysfunction.  Injections have not been particularly helpful.  She recently suffered a fall and has been worse since then.  That occurred a couple of weeks ago.    Review of Systems   Constitutional: Negative for activity change, appetite change, chills, diaphoresis, fatigue, fever and unexpected weight change.   HENT: Negative for congestion, dental problem, drooling, ear discharge, ear pain, facial swelling, hearing loss, mouth sores, nosebleeds, postnasal drip, rhinorrhea, sinus pressure, sneezing, sore throat, tinnitus, trouble swallowing and voice change.    Eyes: Negative for photophobia, pain, discharge, redness, itching and visual disturbance.   Respiratory: Negative for apnea, cough, choking, chest tightness, shortness of breath, wheezing and stridor.    Cardiovascular: Negative for chest pain, palpitations and leg swelling.   Gastrointestinal: Negative for abdominal distention, abdominal pain, anal bleeding, blood in stool, constipation, diarrhea, nausea, rectal pain and vomiting.   Endocrine: Negative for cold intolerance,  "heat intolerance, polydipsia, polyphagia and polyuria.   Genitourinary: Negative for decreased urine volume, difficulty urinating, dysuria, enuresis, flank pain, frequency, genital sores, hematuria and urgency.   Musculoskeletal: Positive for arthralgias, back pain, gait problem, myalgias, neck pain and neck stiffness. Negative for joint swelling.   Skin: Negative for color change, pallor, rash and wound.   Allergic/Immunologic: Negative for environmental allergies, food allergies and immunocompromised state.   Neurological: Negative for dizziness, tremors, seizures, syncope, facial asymmetry, speech difficulty, weakness, light-headedness, numbness and headaches.   Hematological: Negative for adenopathy. Does not bruise/bleed easily.   Psychiatric/Behavioral: Negative for agitation, behavioral problems, confusion, decreased concentration, dysphoric mood, hallucinations, self-injury, sleep disturbance and suicidal ideas. The patient is not nervous/anxious and is not hyperactive.    All other systems reviewed and are negative.      The patient's past medical history, past surgical history, family history, and social history have been reviewed at length in the electronic medical record.    Physical Exam:   Temp 98.3 °F (36.8 °C) (Temporal)   Resp 16   Ht 160 cm (63\")   Wt 74.8 kg (165 lb)   BMI 29.23 kg/m²   MUSCULOSKELETAL:  Straight leg raising is negative.  Harry's Sign is negative.  ROM in back normal.  Tenderness in the back to palpation is not observed.  NEUROLOGICAL:  Strength is intact in the lower extremities to direct testing.  Muscle tone is normal throughout.  Station and gait are normal.  Sensation is intact to light touch testing throughout.  Deep tendon reflexes are 2+ and symmetrical except at the ankles were reflexes are trace.  Anupam signs are negative.  Coordination is intact.      Medical Decision Making    Data Review:   Flexion-extension films reveal a couple of millimeters of movement at " L4-5.    Cervical MRI dated 2/28/19 demonstrates diffuse spondylosis.  There is a low-grade offset of C5 on C6.  Lumbar MRI from the same date demonstrates laminotomy defect on the right at L1-2.  Disc protrusion into the recess and foramen on the left is noted at L2-3.  There is moderate to generous stenosis at L3-4 and L4-5.  There is some degree of disc protrusion more leftward at L3-4.      Diagnosis:   1.  Lumbar stenosis with neurogenic claudication.  2.  Mechanical low back pain.  3.  Cervical spondylosis with mechanical neck pain.    Treatment Options:   Given her significant difficulties I recommended decompressive laminectomies at L3-4 and L4-5.  This is likely to help with her claudication symptoms but it is not a panacea for all of her mechanical neck and back pain.  Formal cardiac clearance will be required.  She may continue her aspirin but I would like her off of the Plavix in the perioperative period.  The nature of the procedure as well as the potential risks, complications, limitations, and alternatives to the procedure were discussed at length with the patient and the patient has agreed to proceed with surgery.       Diagnosis Plan   1. Spinal stenosis of lumbar region with neurogenic claudication     2. Facet arthritis of lumbar region (CMS/HCC)     3. Spondylolisthesis of lumbar region         Scribed for Dionicio Pollard MD by Kristine Kumari CMA on 8/27/2019 2:46 PM       I, Dr. Pollard, personally performed the services described in the documentation, as scribed in my presence, and it is both accurate and complete.

## 2019-08-29 ENCOUNTER — OFFICE VISIT (OUTPATIENT)
Dept: PAIN MEDICINE | Facility: CLINIC | Age: 74
End: 2019-08-29

## 2019-08-29 VITALS
BODY MASS INDEX: 29.23 KG/M2 | DIASTOLIC BLOOD PRESSURE: 88 MMHG | HEIGHT: 63 IN | WEIGHT: 165 LBS | TEMPERATURE: 98.4 F | SYSTOLIC BLOOD PRESSURE: 144 MMHG

## 2019-08-29 DIAGNOSIS — M48.062 LUMBAR STENOSIS WITH NEUROGENIC CLAUDICATION: ICD-10-CM

## 2019-08-29 DIAGNOSIS — M54.81 OCCIPITAL NEURALGIA OF LEFT SIDE: Primary | ICD-10-CM

## 2019-08-29 DIAGNOSIS — M79.18 MYOFASCIAL PAIN: ICD-10-CM

## 2019-08-29 DIAGNOSIS — M54.81 OCCIPITAL NEURALGIA OF LEFT SIDE: ICD-10-CM

## 2019-08-29 DIAGNOSIS — F10.11 HISTORY OF ALCOHOL ABUSE: ICD-10-CM

## 2019-08-29 DIAGNOSIS — Z95.5 HISTORY OF CORONARY ARTERY STENT PLACEMENT: ICD-10-CM

## 2019-08-29 DIAGNOSIS — M51.36 DDD (DEGENERATIVE DISC DISEASE), LUMBAR: ICD-10-CM

## 2019-08-29 DIAGNOSIS — M47.812 CERVICAL SPONDYLOSIS WITHOUT MYELOPATHY: ICD-10-CM

## 2019-08-29 DIAGNOSIS — Z98.890 HISTORY OF LUMBAR LAMINECTOMY FOR SPINAL CORD DECOMPRESSION: ICD-10-CM

## 2019-08-29 PROBLEM — M51.369 DDD (DEGENERATIVE DISC DISEASE), LUMBAR: Status: ACTIVE | Noted: 2019-08-29

## 2019-08-29 PROCEDURE — 99214 OFFICE O/P EST MOD 30 MIN: CPT | Performed by: ANESTHESIOLOGY

## 2019-08-29 NOTE — PROGRESS NOTES
"Chief Complaint: \"Pain on the left side of my neck. Headaches in the back of my head on the left side. The right sided pain has been gone since the injections.\"       History of Present Illness: Ms. Alysia Aguayo, 73 y.o. female  was last seen on July 24, 2019, when she underwent diagnostic and therapeutic cervical facet joint injections bilateral C5-C6 and C6-C7 combined with trigger point injections at the bilateral levator scapula and bilateral trapezius from which she has experienced almost complete pain relief for her right-sided neck pain. Patient reports that she still gets significant relief from the procedure but there are a couple of areas on the left side of her neck that still hurt; \"where the neck meets the shoulder and then in the left occipital region.\"  She has been suffering left occipital headaches.  She underwent follow-up neurosurgical consultation with Dr. Dionicio Pollard on August 27, 2019 with plans for decompressive lumbar laminectomies at L3-L4 and L4-L5 for treatment of lumbar spinal stenosis with neurogenic claudication.  Patient has a prior history of right L1-L2 discectomy on July 10, 2014 from which she has done well.  Patient is not yet scheduled for her lumbar surgery. She needs cardiology clearance. In regards to her chronic cervicalgia and left occipital headaches, she has tried conservative measures including oral analgesics, adjuvants, ice, heat, physical therapy, stretching exercises, without any meaningful relief.  Previously, on 03/09/2018, she underwent diagnostic and therapeutic right cervical facet joint injections and experienced almost complete pain relief for several months. In fact, since her cervical facet injections, her right-sided pain has been resolved. An MRI from February 2019 revealed multilevel degenerative changes of degenerative disc disease and facet arthropathy from C2-C3 through C7-T1.  No significant canal stenosis. There is degrees of multilevel " foraminal stenosis without significant clinical correlation. She is now complaining of recurrent left-sided neck pain that radiates into the left shoulder and left occipital region.   Pain history: Patient reports a 20+-year history of pain, which began after riding a roller coaster.   Pain description: constant pain with intermittent exacerbation, described as dull/tooth ache like and throbbing sensation.   Radiation of pain: The left-sided neck pain radiates into the left shoulder blade and into the left occipital region causing left occipital headaches  Pain intensity today: 9/10  Average pain intensity last week: 9/10  Pain intensity ranges from: 8/10 to 10/10  Aggravating factors: Pain increases with flexion, extension and rotation of the cervical spine   Alleviating factors: Tylenol, ice, heat, icy hot,     Associated symptoms:   Patient denies pain, numbness or weakness in the upper extremities.   Patient denies any new bladder or bowel problems.   Patient denies difficulties with her balance or recent falls.   Patient reports daily left occipital headaches associated with her neck pain lasting all day.     Review of previous therapies and additional medical records:  Alysia Aguayo has already failed the following measures, including:   Conservative measures: oral analgesics, physical therapy, ice and heat   Interventional measures: Patient underwent diagnostic/therapeutic right cervical facet joint injections at C5-C6, C6-C7, combined with trigger point injections at the right trapezius, and right levator scapulae on 07/22/2015 and reports complete pain relief for almost 2 years.   Surgical measures: No history of cervical spine surgery  Alysia Aguayo underwent neurosurgical consultation with Dr. Dionicio Pollard on 05/12/2015, and was found not to be a surgical candidate.  Alysia Aguayo presents with significant comorbidities including depression, hypertension, hyperlipidemia, coronary artery disease  with anticoagulation therapy; engaged in treatment. Patient takes Plavix daily, prescribed by Dr. Bloom due to placement of 3 stents in the heart. She reports discontinuing her anticoagulant therapy in the past to undergo procedures.  In terms of current analgesics, Alysia Aguayo takes: Tylenol. Patient also takes Prozac  I have reviewed Karthik Report #[id number] consistent to medication reconciliation.    Global Pain Scale 08-21 2018 05-20 2019 07-18 2019 08-29 2019       Pain 15 12 13 20       Feelings 0 0 2   4       Clinical outcomes 3 3 1   3       Activities 9 0 2 25       GPS Total: 27 15 18 52         Review of New Diagnostic Studies:    MRI of the cervical spine without contrast on February 20, 2019, radiology report: Normal cervical alignment.  Vertebral heights are well-maintained.  Craniocervical junction is unremarkable.  Mild to moderate degenerative disc disease in the lower cervical spine.  Bone marrow signal is within normal limits.  Visualized portions of the posterior fossa are unremarkable.  Cervical cord demonstrates normal course, caliber, and signal characteristics. Axial imaging::  C2-C3: Moderate facet arthrosis causing mild right neuroforaminal stenosis.  Left neuroforamina is preserved.  The central canal is intact.    C3-C4: Mild diffuse disc osteophytes and uncovertebral degenerative change on the left causing mild to moderate left foraminal stenosis.  Right neuroforaminal is preserved.  Mild canal stenosis  C4-C5: Mild diffuse disc osteophyte with moderate right facet arthrosis.  Mild bilateral neuroforaminal stenosis.  Mild canal stenosis  C5-C6: Diffuse disc osteophyte and uncovertebral degenerative change.  Advanced bilateral neuroforaminal stenosis and mild canal stenosis  C6-C7: Diffuse disc osteophyte and uncovertebral degenerative change causing at least mild central canal stenosis with mild impingement of the ventral cord and advanced bilateral neuroforaminal  stenosis  C7-T1: No significant disc herniation or protrusion.  No canal stenosis or foraminal stenosis.  MRI of the lumbar spine without contrast on February 20, 2019, radiology report, alignment is normal.  Vertebral heights are well-maintained.  Normal bone marrow signal.  Conus medullaris is unremarkable.  L1-L2: Diffuse annular bulge and moderate facet arthrosis.  Right hemilaminectomy.  There is mild bilateral subarticular recess stenosis.  Moderate to advanced right foraminal stenosis.  L2-L3: Diffuse annular bulge and moderate facet arthrosis with left asymmetric posterolateral protrusion.  Moderate to advanced central canal stenosis and advanced left subarticular recess stenosis.  Mild bilateral neuroforaminal stenosis  L3-L4: Diffuse annular bulge and moderate facet arthrosis causing advanced central canal stenosis.  Mild right neuroforaminal stenosis.  Broad-based left foraminal protrusion with advanced left foraminal stenosis with impingement of the exiting left L3 nerve root.  L4-L5: Diffuse annular bulge and moderate facet arthrosis causing moderate to advanced central canal stenosis.  Mild bilateral neuroforaminal stenosis  L5-S1: Diffuse annular bulge and moderate facet arthrosis resulting in mild bilateral subarticular recess stenosis.  Mild bilateral neuroforaminal stenosis  XR SPINE, LUMBAR, AP AND LATERAL WITH FLEXION AND EXTENSION-03/06/2019:  Extensive degenerative changes seen at the L1/L2 level. Slight scoliotic curvature with convexity to the right. Alignment stable in flexion and extension.     Review of Previous Diagnostic Studies:    MRI Cervical Spine w/o Contrast on 04/28/2015: Cervical facet arthropathy. C5-C6, moderate to high-grade bilateral neural foraminal narrowing. C6-C7, C6-C7, moderate to high-grade bilateral neural foraminal narrowing.  X-Ray Cervical Spine 4 View on 04/21/2015: Mild to moderate degenerative changes worse at C6-C7. Mild anterior listhesis of C5 on  C6.    Review of Systems   Musculoskeletal: Positive for neck pain and neck stiffness.   All other systems reviewed and are negative.     Patient Active Problem List   Diagnosis   • Cervical spondylosis without myelopathy   • Myofascial pain   • Frequent headaches   • History of alcohol abuse   • History of coronary artery stent placement   • History of lumbar laminectomy for spinal cord decompression   • Occipital neuralgia of left side   • Lumbar stenosis with neurogenic claudication   • DDD (degenerative disc disease), lumbar       Past Medical History:   Diagnosis Date   • Heart disease    • Kidney disease    • Neck pain          Past Surgical History:   Procedure Laterality Date   • CHOLECYSTECTOMY     • HYSTERECTOMY     • LUMBAR DISCECTOMY  07/10/2014    Rt- L1/2 Dr. Dionicio Pollard    • TUBAL ABDOMINAL LIGATION           Family History   Problem Relation Age of Onset   • Heart disease Mother    • Emphysema Mother    • Cancer Father          Social History     Socioeconomic History   • Marital status:      Spouse name: Not on file   • Number of children: Not on file   • Years of education: Not on file   • Highest education level: Not on file   Tobacco Use   • Smoking status: Former Smoker   • Smokeless tobacco: Former User     Quit date: 4/17/2009   Substance and Sexual Activity   • Alcohol use: No   • Drug use: No   • Sexual activity: Defer           Current Outpatient Medications:   •  acetaminophen (TYLENOL) 325 MG tablet, Take  by mouth., Disp: , Rfl:   •  aspirin (ASPIRIN 81) 81 MG chewable tablet, Chew Daily., Disp: , Rfl:   •  atorvastatin (LIPITOR) 80 MG tablet, Take  by mouth., Disp: , Rfl:   •  clopidogrel (PLAVIX) 75 MG tablet, Take  by mouth Daily., Disp: , Rfl:   •  FLUoxetine (PROZAC) 40 MG capsule, Take 40 mg by mouth Daily., Disp: , Rfl:   •  lisinopril (PRINIVIL,ZESTRIL) 5 MG tablet, Take  by mouth Daily., Disp: , Rfl:   •  ranitidine (ZANTAC) 150 MG capsule, Take  by mouth., Disp: , Rfl:  "  •  tiZANidine (ZANAFLEX) 2 MG tablet, , Disp: , Rfl: 5      Allergies   Allergen Reactions   • Codeine GI Intolerance   • Penicillins Hives         /88 (BP Location: Left arm, Patient Position: Sitting)   Temp 98.4 °F (36.9 °C) (Temporal)   Ht 160 cm (63\")   Wt 74.8 kg (165 lb)   BMI 29.23 kg/m²       Physical Exam:  Constitutional: Patient is oriented to person, place, and time. Patient appears well-developed and well-nourished.   HENT: Head: Normocephalic and atraumatic. Eyes: Conjunctivae and lids are normal. Pupils: Equal, round, reactive to light.   Neck: Trachea normal. Neck supple. No JVD present.   Lymphatic: No cervical adenopathy  Pulmonary Respiratory effort: No increased work of breathing or signs of respiratory distress. Auscultation of lungs: Clear to auscultation.   Cardiovascular Auscultation of heart: Normal rate and rhythm, normal S1 and S2, no murmurs.   Musculoskeletal   Gait and station: Gait evaluation demonstrated a normal gait   Cervical spine: Passive and active range of motion are limited secondary to pain. Extension, lateral flexion, rotation of the cervical spine increased and reproduced pain. Cervical facet joint loading maneuvers are positive on the left, negative on the right at this time. Palpation of the left occipital and suboccipital region reproduces pain  Muscles: Presence of active trigger points a the left levator scapulae and left trapezius   Shoulders: The range of motion of the glenohumeral joints is full and without pain. Rotator cuff strength is 5/5.   Lumbar spine: The range of motion of the lumbar spine is almost full. Extension, flexion, and rotation of the lumbar spine increased and reproduced pain.  Lumbar facet joint loading maneuvers are positive.   Hip joints: The range of motion of the hip joints is full and without pain  Neurological: Patient is alert and oriented to person, place, and time. Speech: speech is normal. Cortical function: Normal mental " status.   Cranial nerves: Cranial nerves 2-12 intact.   Reflex Scores:  Right brachioradialis: 2+  Left brachioradialis: 2+  Right biceps: 2+  Left biceps: 2+  Right triceps: 2+  Left triceps: 2+  Right patellar: 2+  Left patellar: 2+  Right achilles: 2+  Left achilles: 2+  Motor strength: 5/5  Motor Tone: normal tone.   Involuntary movements: none.   Superficial/Primitive Reflexes: primitive reflexes were absent.   Right Fox: absent  Left Fox: absent  Right ankle clonus: absent  Left ankle clonus: absent   Spurling sign is negative. Lhermitte sign is negative. Negative long tract signs. Straight leg raising test is negative.   Sensation: No sensory loss. Sensory exam: intact to light touch, intact pain and temperature sensation, intact vibration sensation and normal proprioception.   Coordination: Normal finger to nose and heel to shin. Normal balance and negative. Romberg's sign negative.   Skin and subcutaneous tissue: Skin is warm and intact. No rash noted. No cyanosis.   Psychiatric: Judgment and insight: Normal. Orientation to person, place and time: Normal. Recent and remote memory: Intact. Mood and affect: Normal.     ASSESSMENT:   1. Occipital neuralgia of left side    2. Myofascial pain    3. Cervical spondylosis without myelopathy    4. DDD (degenerative disc disease), lumbar    5. History of lumbar laminectomy for spinal cord decompression    6. Lumbar stenosis with neurogenic claudication    7. History of alcohol abuse    8. History of coronary artery stent placement         PLAN/MEDICAL DECISION MAKING: I had a lengthy conversation with Ms. Alysia Aguayo regarding her chronic pain condition and potential therapeutic options including risks, benefits, alternative therapies, to name a few. Patient has failed to obtain pain relief with conservative measures, as referenced above. Patient underwent right cervical facet joint injections and reports complete ongoing pain relief on the right side of  her neck. She is experiencing left-sided neck pain at this time. I have reviewed all available patient's medical records as well as previous therapies as referenced above.  Therefore, I have proposed the following plan:  1. Interventional pain management measures: Patient will be scheduled for diagnostic and therapeutic left greater occipital nerve block by hydrodissection technique under ultrasound and fluoroscopic guidance combined with trigger point injection at the left levator scapulae. If her mechanical axial neck pain recurs, then, we may repeat bilateral cervical facet joint injections at C5-C6, C6-C7. If patient fails to obtain sustained pain relief, then, we will proceed with diagnostic bilateral cervical medial branch blocks at C4, C5, C6; for bilateral cervical facet joints at C5-C6, C6-C7 to clarify the origin of her unrelenting pain. If patient experiences more than 80% pain relief along with significant improvement in the range of motion of the cervical spine, then, patient will be scheduled for a second set of diagnostic bilateral cervical medial branch blocks, to then, proceed with cervical medial branch rhizotomies. I have also discussed with the patient the possibility of JULIA if she continues to struggle with pain and if is able to stop her blood thinners  2. Pharmacological measures, as follows;  A. Tylenol. Patient also takes Prozac  B. Trial with Voltaren gel  C. Continue tizanidine 2 mg ½ tablet three times a day as needed for muscle spasms  3.  Long-term rehabilitation efforts:  A. The patient does not have a history of falls. I did complete a risk assessment for falls.   B. Patient will start a comprehensive physical therapy program for water therapy, upper body strengthening/posture correction, gait and balance training, neurodynamics, myofascial release, cupping and dry needling   C. Start an exercise program such as yoga and water therapy  D. Use TENS unit on regular   E. Contrast  therapy: Apply ice-packs for 15-20 minutes, followed by heating pads for 15-20 minutes to affected area   4. The patient and her family have been instructed to contact my office with any questions or difficulties. The patient understands the plan and agrees to proceed accordingly.       Patient Care Team:  Yesica Zhu PA as PCP - General  Dionicio Pollard MD as Consulting Physician (Neurosurgery)  Heriberto Lopes MD as Consulting Physician (Pain Medicine)  Varinder Alan MD as Consulting Physician (Cardiology)  Cortez Sheets PA-C as Physician Assistant (Physician Assistant)     No orders of the defined types were placed in this encounter.        No future appointments.      Heriberto Lopes MD     EMR Dragon/Transcription disclaimer:  Much of this encounter note is an electronic transcription of spoken language to printed text. Electronic transcription of spoken language may permit erroneous, or at times, nonsensical words or phrases to be inadvertently transcribed. Although I have reviewed the note for such errors, some may still exist.

## 2019-09-11 ENCOUNTER — OUTSIDE FACILITY SERVICE (OUTPATIENT)
Dept: PAIN MEDICINE | Facility: CLINIC | Age: 74
End: 2019-09-11

## 2019-09-11 PROCEDURE — 20552 NJX 1/MLT TRIGGER POINT 1/2: CPT | Performed by: ANESTHESIOLOGY

## 2019-09-11 PROCEDURE — 76942 ECHO GUIDE FOR BIOPSY: CPT | Performed by: ANESTHESIOLOGY

## 2019-09-11 PROCEDURE — 64405 NJX AA&/STRD GR OCPL NRV: CPT | Performed by: ANESTHESIOLOGY

## 2019-09-11 PROCEDURE — 99152 MOD SED SAME PHYS/QHP 5/>YRS: CPT | Performed by: ANESTHESIOLOGY

## 2019-10-07 RX ORDER — TIZANIDINE 2 MG/1
TABLET ORAL
Qty: 60 TABLET | Refills: 5 | Status: SHIPPED | OUTPATIENT
Start: 2019-10-07 | End: 2020-09-21 | Stop reason: SDUPTHER

## 2019-10-10 ENCOUNTER — APPOINTMENT (OUTPATIENT)
Dept: PREADMISSION TESTING | Facility: HOSPITAL | Age: 74
End: 2019-10-10

## 2019-10-10 VITALS — HEIGHT: 63 IN | WEIGHT: 165.79 LBS | BODY MASS INDEX: 29.38 KG/M2

## 2019-10-10 LAB
DEPRECATED RDW RBC AUTO: 51.3 FL (ref 37–54)
ERYTHROCYTE [DISTWIDTH] IN BLOOD BY AUTOMATED COUNT: 13.2 % (ref 12.3–15.4)
HBA1C MFR BLD: 5.5 % (ref 4.8–5.6)
HCT VFR BLD AUTO: 38.9 % (ref 34–46.6)
HGB BLD-MCNC: 12.3 G/DL (ref 12–15.9)
MCH RBC QN AUTO: 32.6 PG (ref 26.6–33)
MCHC RBC AUTO-ENTMCNC: 31.6 G/DL (ref 31.5–35.7)
MCV RBC AUTO: 103.2 FL (ref 79–97)
PLATELET # BLD AUTO: 293 10*3/MM3 (ref 140–450)
PMV BLD AUTO: 9 FL (ref 6–12)
RBC # BLD AUTO: 3.77 10*6/MM3 (ref 3.77–5.28)
WBC NRBC COR # BLD: 6.68 10*3/MM3 (ref 3.4–10.8)

## 2019-10-10 PROCEDURE — 85027 COMPLETE CBC AUTOMATED: CPT | Performed by: ANESTHESIOLOGY

## 2019-10-10 PROCEDURE — 87081 CULTURE SCREEN ONLY: CPT | Performed by: ANESTHESIOLOGY

## 2019-10-10 PROCEDURE — 83036 HEMOGLOBIN GLYCOSYLATED A1C: CPT | Performed by: ANESTHESIOLOGY

## 2019-10-10 PROCEDURE — 36415 COLL VENOUS BLD VENIPUNCTURE: CPT

## 2019-10-10 RX ORDER — LISINOPRIL 40 MG/1
40 TABLET ORAL DAILY
COMMUNITY
End: 2019-10-19 | Stop reason: HOSPADM

## 2019-10-10 RX ORDER — ATORVASTATIN CALCIUM 40 MG/1
40 TABLET, FILM COATED ORAL DAILY
COMMUNITY
End: 2022-06-29 | Stop reason: HOSPADM

## 2019-10-10 NOTE — PAT
Verified with patient that they received a prescription for Bactroban and Chlorhexidine shower from the office.  Reinforced with them to fill the prescription if they haven't already.  Verbal and written instructions given regarding proper use of the Bactroban and Chlorhexidine to patient and/or famlily during PAT visit. Patient/family also instructed to complete checklist and return it to Pre-op on the day of surgery.  Patient and/or family verbalized understanding.    Patient to apply Chlorhexadine wipes  to surgical area (as instructed) the night before procedure and the AM of procedure. Wipes provided.    Patient instructed to drink 20 ounces (or until full) of Gatorade and it needs to be completed 1 hour before given arrival time for procedure (NO RED Gatorade)    Patient verbalized understanding.    Ekg, cardiac clearance with ok to stop plavix 5-7 days before surgery on chart per dr vick/rakel rivas

## 2019-10-12 LAB — MRSA SPEC QL CULT: NORMAL

## 2019-10-16 ENCOUNTER — ANESTHESIA EVENT (OUTPATIENT)
Dept: PERIOP | Facility: HOSPITAL | Age: 74
End: 2019-10-16

## 2019-10-16 RX ORDER — SODIUM CHLORIDE 0.9 % (FLUSH) 0.9 %
3-10 SYRINGE (ML) INJECTION AS NEEDED
Status: CANCELLED | OUTPATIENT
Start: 2019-10-16

## 2019-10-16 RX ORDER — SODIUM CHLORIDE 0.9 % (FLUSH) 0.9 %
3 SYRINGE (ML) INJECTION EVERY 12 HOURS SCHEDULED
Status: CANCELLED | OUTPATIENT
Start: 2019-10-16

## 2019-10-17 ENCOUNTER — ANESTHESIA (OUTPATIENT)
Dept: PERIOP | Facility: HOSPITAL | Age: 74
End: 2019-10-17

## 2019-10-17 ENCOUNTER — HOSPITAL ENCOUNTER (OUTPATIENT)
Facility: HOSPITAL | Age: 74
Discharge: HOME | End: 2019-10-19
Attending: NEUROLOGICAL SURGERY | Admitting: NEUROLOGICAL SURGERY

## 2019-10-17 ENCOUNTER — APPOINTMENT (OUTPATIENT)
Dept: GENERAL RADIOLOGY | Facility: HOSPITAL | Age: 74
End: 2019-10-17

## 2019-10-17 DIAGNOSIS — M48.062 LUMBAR STENOSIS WITH NEUROGENIC CLAUDICATION: ICD-10-CM

## 2019-10-17 PROCEDURE — 76000 FLUOROSCOPY <1 HR PHYS/QHP: CPT

## 2019-10-17 PROCEDURE — A9270 NON-COVERED ITEM OR SERVICE: HCPCS | Performed by: NEUROLOGICAL SURGERY

## 2019-10-17 PROCEDURE — 25010000002 VANCOMYCIN 10 G RECONSTITUTED SOLUTION: Performed by: NEUROLOGICAL SURGERY

## 2019-10-17 PROCEDURE — 63048 LAM FACETEC &FORAMOT EA ADDL: CPT | Performed by: NEUROLOGICAL SURGERY

## 2019-10-17 PROCEDURE — 63047 LAM FACETEC & FORAMOT LUMBAR: CPT | Performed by: NEUROLOGICAL SURGERY

## 2019-10-17 PROCEDURE — 63710000001 ATORVASTATIN 40 MG TABLET: Performed by: NEUROLOGICAL SURGERY

## 2019-10-17 PROCEDURE — 25010000002 VANCOMYCIN PER 500 MG: Performed by: NEUROLOGICAL SURGERY

## 2019-10-17 PROCEDURE — 25010000002 NEOSTIGMINE 10 MG/10ML SOLUTION: Performed by: NURSE ANESTHETIST, CERTIFIED REGISTERED

## 2019-10-17 PROCEDURE — 63710000001 TIZANIDINE 4 MG TABLET: Performed by: NEUROLOGICAL SURGERY

## 2019-10-17 PROCEDURE — G0378 HOSPITAL OBSERVATION PER HR: HCPCS

## 2019-10-17 PROCEDURE — 25010000002 PROPOFOL 10 MG/ML EMULSION: Performed by: NURSE ANESTHETIST, CERTIFIED REGISTERED

## 2019-10-17 PROCEDURE — 63710000001 ASPIRIN 81 MG CHEWABLE TABLET: Performed by: NEUROLOGICAL SURGERY

## 2019-10-17 PROCEDURE — 25010000002 FENTANYL CITRATE (PF) 100 MCG/2ML SOLUTION: Performed by: NURSE ANESTHETIST, CERTIFIED REGISTERED

## 2019-10-17 PROCEDURE — S0260 H&P FOR SURGERY: HCPCS | Performed by: NURSE PRACTITIONER

## 2019-10-17 PROCEDURE — 63710000001 FLUOXETINE 20 MG CAPSULE: Performed by: NEUROLOGICAL SURGERY

## 2019-10-17 PROCEDURE — 25010000002 PHENYLEPHRINE PER 1 ML: Performed by: NURSE ANESTHETIST, CERTIFIED REGISTERED

## 2019-10-17 PROCEDURE — 25010000002 DEXAMETHASONE PER 1 MG: Performed by: NURSE ANESTHETIST, CERTIFIED REGISTERED

## 2019-10-17 PROCEDURE — 63710000001 OXYCODONE-ACETAMINOPHEN 7.5-325 MG TABLET: Performed by: NEUROLOGICAL SURGERY

## 2019-10-17 PROCEDURE — 63710000001 FAMOTIDINE 20 MG TABLET: Performed by: NEUROLOGICAL SURGERY

## 2019-10-17 PROCEDURE — 25010000002 ONDANSETRON PER 1 MG: Performed by: NURSE ANESTHETIST, CERTIFIED REGISTERED

## 2019-10-17 PROCEDURE — 63710000001 LISINOPRIL 40 MG TABLET: Performed by: NEUROLOGICAL SURGERY

## 2019-10-17 RX ORDER — VANCOMYCIN HYDROCHLORIDE 1 G/200ML
15 INJECTION, SOLUTION INTRAVENOUS ONCE
Status: COMPLETED | OUTPATIENT
Start: 2019-10-17 | End: 2019-10-17

## 2019-10-17 RX ORDER — SODIUM CHLORIDE 0.9 % (FLUSH) 0.9 %
10 SYRINGE (ML) INJECTION AS NEEDED
Status: DISCONTINUED | OUTPATIENT
Start: 2019-10-17 | End: 2019-10-19 | Stop reason: HOSPADM

## 2019-10-17 RX ORDER — PROMETHAZINE HYDROCHLORIDE 12.5 MG/1
12.5 TABLET ORAL EVERY 6 HOURS PRN
Status: DISCONTINUED | OUTPATIENT
Start: 2019-10-17 | End: 2019-10-19 | Stop reason: HOSPADM

## 2019-10-17 RX ORDER — PROMETHAZINE HYDROCHLORIDE 25 MG/ML
6.25 INJECTION, SOLUTION INTRAMUSCULAR; INTRAVENOUS ONCE AS NEEDED
Status: DISCONTINUED | OUTPATIENT
Start: 2019-10-17 | End: 2019-10-17 | Stop reason: HOSPADM

## 2019-10-17 RX ORDER — SODIUM CHLORIDE 9 MG/ML
INJECTION, SOLUTION INTRAVENOUS AS NEEDED
Status: DISCONTINUED | OUTPATIENT
Start: 2019-10-17 | End: 2019-10-17 | Stop reason: HOSPADM

## 2019-10-17 RX ORDER — LIDOCAINE HYDROCHLORIDE 10 MG/ML
INJECTION, SOLUTION EPIDURAL; INFILTRATION; INTRACAUDAL; PERINEURAL AS NEEDED
Status: DISCONTINUED | OUTPATIENT
Start: 2019-10-17 | End: 2019-10-17 | Stop reason: SURG

## 2019-10-17 RX ORDER — BUPIVACAINE HYDROCHLORIDE AND EPINEPHRINE 2.5; 5 MG/ML; UG/ML
INJECTION, SOLUTION EPIDURAL; INFILTRATION; INTRACAUDAL; PERINEURAL AS NEEDED
Status: DISCONTINUED | OUTPATIENT
Start: 2019-10-17 | End: 2019-10-17 | Stop reason: HOSPADM

## 2019-10-17 RX ORDER — FENTANYL CITRATE 50 UG/ML
INJECTION, SOLUTION INTRAMUSCULAR; INTRAVENOUS AS NEEDED
Status: DISCONTINUED | OUTPATIENT
Start: 2019-10-17 | End: 2019-10-17 | Stop reason: SURG

## 2019-10-17 RX ORDER — DEXAMETHASONE SODIUM PHOSPHATE 4 MG/ML
INJECTION, SOLUTION INTRA-ARTICULAR; INTRALESIONAL; INTRAMUSCULAR; INTRAVENOUS; SOFT TISSUE AS NEEDED
Status: DISCONTINUED | OUTPATIENT
Start: 2019-10-17 | End: 2019-10-17 | Stop reason: SURG

## 2019-10-17 RX ORDER — SODIUM CHLORIDE, SODIUM LACTATE, POTASSIUM CHLORIDE, CALCIUM CHLORIDE 600; 310; 30; 20 MG/100ML; MG/100ML; MG/100ML; MG/100ML
90 INJECTION, SOLUTION INTRAVENOUS CONTINUOUS
Status: DISCONTINUED | OUTPATIENT
Start: 2019-10-17 | End: 2019-10-18

## 2019-10-17 RX ORDER — PROMETHAZINE HYDROCHLORIDE 25 MG/ML
12.5 INJECTION, SOLUTION INTRAMUSCULAR; INTRAVENOUS EVERY 6 HOURS PRN
Status: DISCONTINUED | OUTPATIENT
Start: 2019-10-17 | End: 2019-10-19 | Stop reason: HOSPADM

## 2019-10-17 RX ORDER — ONDANSETRON 4 MG/1
4 TABLET, FILM COATED ORAL EVERY 6 HOURS PRN
Status: DISCONTINUED | OUTPATIENT
Start: 2019-10-17 | End: 2019-10-19 | Stop reason: HOSPADM

## 2019-10-17 RX ORDER — HYDROCODONE BITARTRATE AND ACETAMINOPHEN 5; 325 MG/1; MG/1
1 TABLET ORAL ONCE AS NEEDED
Status: DISCONTINUED | OUTPATIENT
Start: 2019-10-17 | End: 2019-10-17 | Stop reason: HOSPADM

## 2019-10-17 RX ORDER — ACETAMINOPHEN 500 MG
1000 TABLET ORAL ONCE
Status: COMPLETED | OUTPATIENT
Start: 2019-10-17 | End: 2019-10-17

## 2019-10-17 RX ORDER — FAMOTIDINE 20 MG/1
20 TABLET, FILM COATED ORAL
Status: DISCONTINUED | OUTPATIENT
Start: 2019-10-17 | End: 2019-10-17 | Stop reason: HOSPADM

## 2019-10-17 RX ORDER — GLYCOPYRROLATE 0.2 MG/ML
INJECTION INTRAMUSCULAR; INTRAVENOUS AS NEEDED
Status: DISCONTINUED | OUTPATIENT
Start: 2019-10-17 | End: 2019-10-17 | Stop reason: SURG

## 2019-10-17 RX ORDER — IPRATROPIUM BROMIDE AND ALBUTEROL SULFATE 2.5; .5 MG/3ML; MG/3ML
3 SOLUTION RESPIRATORY (INHALATION) ONCE AS NEEDED
Status: DISCONTINUED | OUTPATIENT
Start: 2019-10-17 | End: 2019-10-17 | Stop reason: HOSPADM

## 2019-10-17 RX ORDER — ASPIRIN 81 MG/1
81 TABLET, CHEWABLE ORAL DAILY
Status: DISCONTINUED | OUTPATIENT
Start: 2019-10-17 | End: 2019-10-19 | Stop reason: HOSPADM

## 2019-10-17 RX ORDER — ACETAMINOPHEN 325 MG/1
650 TABLET ORAL EVERY 4 HOURS PRN
Status: DISCONTINUED | OUTPATIENT
Start: 2019-10-17 | End: 2019-10-19 | Stop reason: HOSPADM

## 2019-10-17 RX ORDER — ONDANSETRON 2 MG/ML
INJECTION INTRAMUSCULAR; INTRAVENOUS AS NEEDED
Status: DISCONTINUED | OUTPATIENT
Start: 2019-10-17 | End: 2019-10-17 | Stop reason: SURG

## 2019-10-17 RX ORDER — LISINOPRIL 40 MG/1
40 TABLET ORAL DAILY
Status: DISCONTINUED | OUTPATIENT
Start: 2019-10-17 | End: 2019-10-18

## 2019-10-17 RX ORDER — FENTANYL CITRATE 50 UG/ML
25 INJECTION, SOLUTION INTRAMUSCULAR; INTRAVENOUS
Status: DISCONTINUED | OUTPATIENT
Start: 2019-10-17 | End: 2019-10-17 | Stop reason: HOSPADM

## 2019-10-17 RX ORDER — FLUOXETINE HYDROCHLORIDE 20 MG/1
40 CAPSULE ORAL NIGHTLY
Status: DISCONTINUED | OUTPATIENT
Start: 2019-10-17 | End: 2019-10-19 | Stop reason: HOSPADM

## 2019-10-17 RX ORDER — PROMETHAZINE HYDROCHLORIDE 12.5 MG/1
12.5 SUPPOSITORY RECTAL EVERY 6 HOURS PRN
Status: DISCONTINUED | OUTPATIENT
Start: 2019-10-17 | End: 2019-10-19 | Stop reason: HOSPADM

## 2019-10-17 RX ORDER — LIDOCAINE HYDROCHLORIDE 10 MG/ML
0.5 INJECTION, SOLUTION EPIDURAL; INFILTRATION; INTRACAUDAL; PERINEURAL ONCE AS NEEDED
Status: COMPLETED | OUTPATIENT
Start: 2019-10-17 | End: 2019-10-17

## 2019-10-17 RX ORDER — DOCUSATE SODIUM 100 MG/1
100 CAPSULE, LIQUID FILLED ORAL 2 TIMES DAILY PRN
Status: DISCONTINUED | OUTPATIENT
Start: 2019-10-17 | End: 2019-10-19 | Stop reason: HOSPADM

## 2019-10-17 RX ORDER — SODIUM CHLORIDE 0.9 % (FLUSH) 0.9 %
3 SYRINGE (ML) INJECTION EVERY 12 HOURS SCHEDULED
Status: DISCONTINUED | OUTPATIENT
Start: 2019-10-17 | End: 2019-10-17 | Stop reason: HOSPADM

## 2019-10-17 RX ORDER — HYDRALAZINE HYDROCHLORIDE 20 MG/ML
5 INJECTION INTRAMUSCULAR; INTRAVENOUS
Status: DISCONTINUED | OUTPATIENT
Start: 2019-10-17 | End: 2019-10-17 | Stop reason: HOSPADM

## 2019-10-17 RX ORDER — MEPERIDINE HYDROCHLORIDE 25 MG/ML
12.5 INJECTION INTRAMUSCULAR; INTRAVENOUS; SUBCUTANEOUS
Status: DISCONTINUED | OUTPATIENT
Start: 2019-10-17 | End: 2019-10-17 | Stop reason: HOSPADM

## 2019-10-17 RX ORDER — OXYCODONE HCL 10 MG/1
10 TABLET, FILM COATED, EXTENDED RELEASE ORAL ONCE
Status: COMPLETED | OUTPATIENT
Start: 2019-10-17 | End: 2019-10-17

## 2019-10-17 RX ORDER — ONDANSETRON 2 MG/ML
4 INJECTION INTRAMUSCULAR; INTRAVENOUS EVERY 6 HOURS PRN
Status: DISCONTINUED | OUTPATIENT
Start: 2019-10-17 | End: 2019-10-19 | Stop reason: HOSPADM

## 2019-10-17 RX ORDER — BISACODYL 10 MG
10 SUPPOSITORY, RECTAL RECTAL DAILY PRN
Status: DISCONTINUED | OUTPATIENT
Start: 2019-10-17 | End: 2019-10-19 | Stop reason: HOSPADM

## 2019-10-17 RX ORDER — TIZANIDINE 4 MG/1
2 TABLET ORAL EVERY 8 HOURS PRN
Status: DISCONTINUED | OUTPATIENT
Start: 2019-10-17 | End: 2019-10-19 | Stop reason: HOSPADM

## 2019-10-17 RX ORDER — PROPOFOL 10 MG/ML
VIAL (ML) INTRAVENOUS AS NEEDED
Status: DISCONTINUED | OUTPATIENT
Start: 2019-10-17 | End: 2019-10-17 | Stop reason: SURG

## 2019-10-17 RX ORDER — LABETALOL HYDROCHLORIDE 5 MG/ML
5 INJECTION, SOLUTION INTRAVENOUS
Status: DISCONTINUED | OUTPATIENT
Start: 2019-10-17 | End: 2019-10-17 | Stop reason: HOSPADM

## 2019-10-17 RX ORDER — NALOXONE HCL 0.4 MG/ML
0.4 VIAL (ML) INJECTION AS NEEDED
Status: DISCONTINUED | OUTPATIENT
Start: 2019-10-17 | End: 2019-10-17 | Stop reason: HOSPADM

## 2019-10-17 RX ORDER — SODIUM CHLORIDE 0.9 % (FLUSH) 0.9 %
3-10 SYRINGE (ML) INJECTION AS NEEDED
Status: DISCONTINUED | OUTPATIENT
Start: 2019-10-17 | End: 2019-10-17 | Stop reason: HOSPADM

## 2019-10-17 RX ORDER — SODIUM CHLORIDE, SODIUM LACTATE, POTASSIUM CHLORIDE, CALCIUM CHLORIDE 600; 310; 30; 20 MG/100ML; MG/100ML; MG/100ML; MG/100ML
9 INJECTION, SOLUTION INTRAVENOUS CONTINUOUS PRN
Status: DISCONTINUED | OUTPATIENT
Start: 2019-10-17 | End: 2019-10-17 | Stop reason: HOSPADM

## 2019-10-17 RX ORDER — IBUPROFEN 800 MG/1
800 TABLET ORAL ONCE
Status: COMPLETED | OUTPATIENT
Start: 2019-10-17 | End: 2019-10-17

## 2019-10-17 RX ORDER — ATORVASTATIN CALCIUM 40 MG/1
40 TABLET, FILM COATED ORAL NIGHTLY
Status: DISCONTINUED | OUTPATIENT
Start: 2019-10-17 | End: 2019-10-19 | Stop reason: HOSPADM

## 2019-10-17 RX ORDER — NALOXONE HCL 0.4 MG/ML
0.4 VIAL (ML) INJECTION
Status: DISCONTINUED | OUTPATIENT
Start: 2019-10-17 | End: 2019-10-19 | Stop reason: HOSPADM

## 2019-10-17 RX ORDER — ONDANSETRON 2 MG/ML
4 INJECTION INTRAMUSCULAR; INTRAVENOUS ONCE AS NEEDED
Status: DISCONTINUED | OUTPATIENT
Start: 2019-10-17 | End: 2019-10-17 | Stop reason: HOSPADM

## 2019-10-17 RX ORDER — DIPHENHYDRAMINE HCL 25 MG
25 CAPSULE ORAL NIGHTLY PRN
Status: DISCONTINUED | OUTPATIENT
Start: 2019-10-17 | End: 2019-10-19 | Stop reason: HOSPADM

## 2019-10-17 RX ORDER — ROCURONIUM BROMIDE 10 MG/ML
INJECTION, SOLUTION INTRAVENOUS AS NEEDED
Status: DISCONTINUED | OUTPATIENT
Start: 2019-10-17 | End: 2019-10-17 | Stop reason: SURG

## 2019-10-17 RX ORDER — SENNA AND DOCUSATE SODIUM 50; 8.6 MG/1; MG/1
1 TABLET, FILM COATED ORAL NIGHTLY PRN
Status: DISCONTINUED | OUTPATIENT
Start: 2019-10-17 | End: 2019-10-19 | Stop reason: HOSPADM

## 2019-10-17 RX ORDER — NEOSTIGMINE METHYLSULFATE 1 MG/ML
INJECTION, SOLUTION INTRAVENOUS AS NEEDED
Status: DISCONTINUED | OUTPATIENT
Start: 2019-10-17 | End: 2019-10-17 | Stop reason: SURG

## 2019-10-17 RX ORDER — BISACODYL 5 MG/1
5 TABLET, DELAYED RELEASE ORAL DAILY PRN
Status: DISCONTINUED | OUTPATIENT
Start: 2019-10-17 | End: 2019-10-19 | Stop reason: HOSPADM

## 2019-10-17 RX ORDER — SODIUM CHLORIDE 0.9 % (FLUSH) 0.9 %
3 SYRINGE (ML) INJECTION EVERY 12 HOURS SCHEDULED
Status: DISCONTINUED | OUTPATIENT
Start: 2019-10-17 | End: 2019-10-19 | Stop reason: HOSPADM

## 2019-10-17 RX ORDER — FLUOXETINE HYDROCHLORIDE 20 MG/1
40 CAPSULE ORAL DAILY
Status: DISCONTINUED | OUTPATIENT
Start: 2019-10-17 | End: 2019-10-17

## 2019-10-17 RX ORDER — HYDROMORPHONE HYDROCHLORIDE 1 MG/ML
0.25 INJECTION, SOLUTION INTRAMUSCULAR; INTRAVENOUS; SUBCUTANEOUS
Status: DISCONTINUED | OUTPATIENT
Start: 2019-10-17 | End: 2019-10-17 | Stop reason: HOSPADM

## 2019-10-17 RX ORDER — FAMOTIDINE 20 MG/1
20 TABLET, FILM COATED ORAL
Status: DISCONTINUED | OUTPATIENT
Start: 2019-10-17 | End: 2019-10-19 | Stop reason: HOSPADM

## 2019-10-17 RX ORDER — PROMETHAZINE HYDROCHLORIDE 25 MG/1
25 TABLET ORAL ONCE AS NEEDED
Status: DISCONTINUED | OUTPATIENT
Start: 2019-10-17 | End: 2019-10-17 | Stop reason: HOSPADM

## 2019-10-17 RX ORDER — MORPHINE SULFATE 4 MG/ML
4 INJECTION, SOLUTION INTRAMUSCULAR; INTRAVENOUS EVERY 4 HOURS PRN
Status: DISCONTINUED | OUTPATIENT
Start: 2019-10-17 | End: 2019-10-19 | Stop reason: HOSPADM

## 2019-10-17 RX ORDER — MORPHINE SULFATE 4 MG/ML
2 INJECTION, SOLUTION INTRAMUSCULAR; INTRAVENOUS EVERY 4 HOURS PRN
Status: DISCONTINUED | OUTPATIENT
Start: 2019-10-17 | End: 2019-10-19 | Stop reason: HOSPADM

## 2019-10-17 RX ORDER — PROMETHAZINE HYDROCHLORIDE 25 MG/1
25 SUPPOSITORY RECTAL ONCE AS NEEDED
Status: DISCONTINUED | OUTPATIENT
Start: 2019-10-17 | End: 2019-10-17 | Stop reason: HOSPADM

## 2019-10-17 RX ORDER — OXYCODONE AND ACETAMINOPHEN 7.5; 325 MG/1; MG/1
1 TABLET ORAL EVERY 4 HOURS PRN
Status: DISCONTINUED | OUTPATIENT
Start: 2019-10-17 | End: 2019-10-19 | Stop reason: HOSPADM

## 2019-10-17 RX ORDER — MAGNESIUM HYDROXIDE 1200 MG/15ML
LIQUID ORAL AS NEEDED
Status: DISCONTINUED | OUTPATIENT
Start: 2019-10-17 | End: 2019-10-17 | Stop reason: HOSPADM

## 2019-10-17 RX ADMIN — LISINOPRIL 40 MG: 40 TABLET ORAL at 10:29

## 2019-10-17 RX ADMIN — OXYCODONE HYDROCHLORIDE AND ACETAMINOPHEN 1 TABLET: 7.5; 325 TABLET ORAL at 17:51

## 2019-10-17 RX ADMIN — NEOSTIGMINE METHYLSULFATE 2.5 MG: 1 INJECTION, SOLUTION INTRAVENOUS at 08:11

## 2019-10-17 RX ADMIN — EPHEDRINE SULFATE 5 MG: 50 INJECTION INTRAMUSCULAR; INTRAVENOUS; SUBCUTANEOUS at 07:11

## 2019-10-17 RX ADMIN — SODIUM CHLORIDE, POTASSIUM CHLORIDE, SODIUM LACTATE AND CALCIUM CHLORIDE 90 ML/HR: 600; 310; 30; 20 INJECTION, SOLUTION INTRAVENOUS at 10:29

## 2019-10-17 RX ADMIN — ROCURONIUM BROMIDE 10 MG: 10 INJECTION INTRAVENOUS at 07:53

## 2019-10-17 RX ADMIN — EPHEDRINE SULFATE 10 MG: 50 INJECTION INTRAMUSCULAR; INTRAVENOUS; SUBCUTANEOUS at 07:34

## 2019-10-17 RX ADMIN — LIDOCAINE HYDROCHLORIDE 0.2 ML: 10 INJECTION, SOLUTION EPIDURAL; INFILTRATION; INTRACAUDAL; PERINEURAL at 06:00

## 2019-10-17 RX ADMIN — LIDOCAINE HYDROCHLORIDE 50 MG: 10 INJECTION, SOLUTION EPIDURAL; INFILTRATION; INTRACAUDAL; PERINEURAL at 07:04

## 2019-10-17 RX ADMIN — PROPOFOL 180 MG: 10 INJECTION, EMULSION INTRAVENOUS at 07:04

## 2019-10-17 RX ADMIN — PHENYLEPHRINE HYDROCHLORIDE 100 MCG: 10 INJECTION INTRAVENOUS at 07:11

## 2019-10-17 RX ADMIN — DEXAMETHASONE SODIUM PHOSPHATE 8 MG: 4 INJECTION, SOLUTION INTRAMUSCULAR; INTRAVENOUS at 07:04

## 2019-10-17 RX ADMIN — ATORVASTATIN CALCIUM 40 MG: 40 TABLET, FILM COATED ORAL at 22:22

## 2019-10-17 RX ADMIN — FENTANYL CITRATE 50 MCG: 50 INJECTION, SOLUTION INTRAMUSCULAR; INTRAVENOUS at 07:04

## 2019-10-17 RX ADMIN — VANCOMYCIN HYDROCHLORIDE 1000 MG: 1 INJECTION, SOLUTION INTRAVENOUS at 06:20

## 2019-10-17 RX ADMIN — OXYCODONE HYDROCHLORIDE 10 MG: 10 TABLET, FILM COATED, EXTENDED RELEASE ORAL at 06:13

## 2019-10-17 RX ADMIN — PHENYLEPHRINE HYDROCHLORIDE 100 MCG: 10 INJECTION INTRAVENOUS at 07:43

## 2019-10-17 RX ADMIN — GLYCOPYRROLATE 0.4 MG: 0.2 INJECTION, SOLUTION INTRAMUSCULAR; INTRAVENOUS at 08:11

## 2019-10-17 RX ADMIN — FAMOTIDINE 20 MG: 20 TABLET ORAL at 06:13

## 2019-10-17 RX ADMIN — IBUPROFEN 800 MG: 800 TABLET ORAL at 06:13

## 2019-10-17 RX ADMIN — ACETAMINOPHEN 1000 MG: 500 TABLET ORAL at 06:13

## 2019-10-17 RX ADMIN — EPHEDRINE SULFATE 5 MG: 50 INJECTION INTRAMUSCULAR; INTRAVENOUS; SUBCUTANEOUS at 07:43

## 2019-10-17 RX ADMIN — FAMOTIDINE 20 MG: 20 TABLET ORAL at 17:51

## 2019-10-17 RX ADMIN — ASPIRIN 81 MG 81 MG: 81 TABLET ORAL at 10:29

## 2019-10-17 RX ADMIN — PHENYLEPHRINE HYDROCHLORIDE 150 MCG: 10 INJECTION INTRAVENOUS at 07:13

## 2019-10-17 RX ADMIN — SODIUM CHLORIDE, POTASSIUM CHLORIDE, SODIUM LACTATE AND CALCIUM CHLORIDE 9 ML/HR: 600; 310; 30; 20 INJECTION, SOLUTION INTRAVENOUS at 06:00

## 2019-10-17 RX ADMIN — EPHEDRINE SULFATE 5 MG: 50 INJECTION INTRAMUSCULAR; INTRAVENOUS; SUBCUTANEOUS at 07:19

## 2019-10-17 RX ADMIN — OXYCODONE HYDROCHLORIDE AND ACETAMINOPHEN 1 TABLET: 7.5; 325 TABLET ORAL at 22:23

## 2019-10-17 RX ADMIN — GLYCOPYRROLATE 0.1 MG: 0.2 INJECTION, SOLUTION INTRAMUSCULAR; INTRAVENOUS at 07:43

## 2019-10-17 RX ADMIN — ROCURONIUM BROMIDE 30 MG: 10 INJECTION INTRAVENOUS at 07:04

## 2019-10-17 RX ADMIN — OXYCODONE HYDROCHLORIDE AND ACETAMINOPHEN 1 TABLET: 7.5; 325 TABLET ORAL at 12:03

## 2019-10-17 RX ADMIN — FLUOXETINE HYDROCHLORIDE 40 MG: 20 CAPSULE ORAL at 23:09

## 2019-10-17 RX ADMIN — ONDANSETRON 4 MG: 2 INJECTION INTRAMUSCULAR; INTRAVENOUS at 08:06

## 2019-10-17 RX ADMIN — PHENYLEPHRINE HYDROCHLORIDE 100 MCG: 10 INJECTION INTRAVENOUS at 07:54

## 2019-10-17 RX ADMIN — VANCOMYCIN HYDROCHLORIDE 1250 MG: 10 INJECTION, POWDER, LYOPHILIZED, FOR SOLUTION INTRAVENOUS at 17:51

## 2019-10-17 RX ADMIN — TIZANIDINE 2 MG: 4 TABLET ORAL at 22:23

## 2019-10-17 NOTE — PLAN OF CARE
Problem: Patient Care Overview  Goal: Plan of Care Review  Outcome: Ongoing (interventions implemented as appropriate)   10/17/19 1724   Coping/Psychosocial   Plan of Care Reviewed With patient   Plan of Care Review   Progress improving   OTHER   Outcome Summary patient arrived to the unit at aprox 1000. covaderm dressing CDI. pain present in lower back Percocet administered small amount of output from DEBBI drain.  Vitals have been stable . Has been up with assist of one will ambulate pt in hallway during 1800 rounds. Voids spontanously.        Problem: Fall Risk (Adult)  Goal: Identify Related Risk Factors and Signs and Symptoms  Outcome: Ongoing (interventions implemented as appropriate)   10/17/19 1724   Fall Risk (Adult)   Related Risk Factors (Fall Risk) gait/mobility problems;fatigue/slow reaction;history of falls;environment unfamiliar   Signs and Symptoms (Fall Risk) presence of risk factors     Goal: Absence of Fall  Outcome: Ongoing (interventions implemented as appropriate)   10/17/19 1724   Fall Risk (Adult)   Absence of Fall achieves outcome       Problem: Laminectomy/Foraminotomy/Discectomy (Adult)  Goal: Signs and Symptoms of Listed Potential Problems Will be Absent, Minimized or Managed (Laminectomy/Foraminotomy/Discectomy)  Outcome: Ongoing (interventions implemented as appropriate)   10/17/19 1724   Goal/Outcome Evaluation   Problems Assessed (Laminectomy/Laminotomy/Discectomy) all   Problems Present (Laminectomy/otomy) pain;functional decline/self-care deficit;respiratory compromise;situational response     Goal: Anesthesia/Sedation Recovery  Outcome: Outcome(s) achieved Date Met: 10/17/19   10/17/19 1724   Goal/Outcome Evaluation   Anesthesia/Sedation Recovery recovered to baseline

## 2019-10-17 NOTE — ANESTHESIA PROCEDURE NOTES
Airway  Urgency: elective    Date/Time: 10/17/2019 7:06 AM  Airway not difficult    General Information and Staff    Patient location during procedure: OR  CRNA: Gely Soler CRNA    Indications and Patient Condition  Indications for airway management: airway protection    Preoxygenated: yes  MILS not maintained throughout  Mask difficulty assessment: 1 - vent by mask    Final Airway Details  Final airway type: endotracheal airway      Successful airway: ETT  Cuffed: yes   Successful intubation technique: direct laryngoscopy  Facilitating devices/methods: intubating stylet  Endotracheal tube insertion site: oral  Blade: Ever  Blade size: 3  ETT size (mm): 7.0  Cormack-Lehane Classification: grade I - full view of glottis  Placement verified by: chest auscultation and capnometry   Measured from: lips  ETT/EBT  to lips (cm): 21  Number of attempts at approach: 1  Assessment: lips, teeth, and gum same as pre-op and atraumatic intubation    Additional Comments  Negative epigastric sounds, Breath sound equal bilaterally with symmetric chest rise and fall

## 2019-10-17 NOTE — H&P
Pre-Op H&P  Alysia Aguayo  [id number]  1945    Chief complaint: Low back and bilateral buttock pain with walking and standing intolerance, L>R    HPI:    8/27/19 office visit:  Ms. Aguayo is a 73-year-old unemployed woman who is known to my service.  On 7/10/14 she underwent right L1-2 discectomy and did well.  She has chronically had neck pain.  She does undergo intermittent injections in her neck by Dr. Lopes.  She has no arm symptoms.  Her main complaint is low back pain that extends into her buttocks.  Her legs feel heavy with walking.  Intermittently she gets numbness in the upper medial aspect of her left leg.  Her back difficulties are better lying down and when flexing forward at the waist.  She is worse with standing and walking.  She denies bowel or bladder dysfunction.  Injections have not been particularly helpful.  She recently suffered a fall and has been worse since then.  That occurred a couple of weeks ago.    10/17/19:  Here today to undergo L3-5 lumbar laminectomy    Review of Systems:  General ROS: negative for chills, fever or skin lesions;  No changes since last office visit  Cardiovascular ROS: no chest pain or dyspnea on exertion.  History of CAD s/p stents.  +cardiac clearance  Respiratory ROS: no cough, shortness of breath, or wheezing    Allergies:   Allergies   Allergen Reactions   • Penicillins Hives   • Codeine Nausea And Vomiting   • Imodium A-D [Loperamide Hcl] Nausea And Vomiting       Home Meds:    No current facility-administered medications on file prior to encounter.      Current Outpatient Medications on File Prior to Encounter   Medication Sig Dispense Refill   • acetaminophen (TYLENOL) 325 MG tablet Take 650 mg by mouth Every 4 (Four) Hours As Needed for Mild Pain  or Moderate Pain .     • aspirin (ASPIRIN 81) 81 MG chewable tablet Chew 81 mg Daily.     • FLUoxetine (PROZAC) 40 MG capsule Take 40 mg by mouth Daily.     • ranitidine (ZANTAC) 150 MG capsule Take 75  mg by mouth Every Evening.     • clopidogrel (PLAVIX) 75 MG tablet Take 75 mg by mouth Daily.     • diclofenac (VOLTAREN) 1 % gel gel Apply 4 g topically to the appropriate area as directed 4 (Four) Times a Day. 100 g 5       PMH:   Past Medical History:   Diagnosis Date   • Arthritis    • Back pain    • Coronary artery disease    • GERD (gastroesophageal reflux disease)    • History of alcoholism (CMS/HCC)    • Hyperlipidemia    • Hypertension    • Kidney disease     was stage 4 but taking excessive amounts of ibuprofen, f/u with nephrologist at the time, resolved since and was to see nephrologist as needed    • Neck pain    • Spastic colon    • Wears dentures    • Wears reading eyeglasses      PSH:    Past Surgical History:   Procedure Laterality Date   • CARDIAC CATHETERIZATION  2010    x2    • CATARACT EXTRACTION Bilateral    • CHOLECYSTECTOMY     • COLONOSCOPY  2016   • CORONARY STENT PLACEMENT  2010    x3    • ESOPHAGEAL DILATATION     • HYSTERECTOMY     • LUMBAR DISCECTOMY  07/10/2014    Rt- L1/2 Dr. Dionicio Pollard    • TUBAL ABDOMINAL LIGATION       Social History:   Tobacco:   Social History     Tobacco Use   Smoking Status Former Smoker   • Packs/day: 1.50   • Years: 40.00   • Pack years: 60.00   • Types: Cigarettes   • Last attempt to quit:    • Years since quittin.8   Smokeless Tobacco Never Used      Alcohol:     Social History     Substance and Sexual Activity   Alcohol Use No    Comment: hasn't used since , hx of alcoholism       Vitals:           /75 (BP Location: Right arm, Patient Position: Sitting)   Pulse 65   Temp 98.6 °F (37 °C) (Temporal)   Resp 18   SpO2 99%   Breastfeeding? No     Physical Exam:  General Appearance:    Alert, cooperative, no distress, appears stated age   Head:    Normocephalic, without obvious abnormality, atraumatic   Lungs:     Clear to auscultation bilaterally, respirations unlabored    Heart:   Regular rate and rhythm, S1 and S2 normal, no murmur,  rub    or gallop    Abdomen:    Soft, non-tender.  +bowel sounds   Breast Exam:    deferred   Genitalia:    deferred   Extremities:   Extremities normal, atraumatic, no cyanosis or edema   Skin:   Skin color, texture, turgor normal, no rashes or lesions   Neurologic:   Grossly intact   Results Review  LABS:  Lab Results   Component Value Date    WBC 6.68 10/10/2019    HGB 12.3 10/10/2019    HCT 38.9 10/10/2019    .2 (H) 10/10/2019     10/10/2019    GLUCOSE 98 07/02/2014    BUN 20 07/02/2014    CREATININE 1.2 07/02/2014     07/02/2014    K 3.90 07/10/2014     07/02/2014    CO2 27 07/02/2014    CALCIUM 9.9 07/02/2014       RADIOLOGY:  Imaging Results (last 72 hours)     ** No results found for the last 72 hours. **          I reviewed the patient's new clinical results.    Cancer Staging (if applicable)  Cancer Patient: __ yes _x_no __unknown; If yes, clinical stage T:__ N:__M:__, stage group or __N/A    Impression/Plan:      Diagnosis:   1.  Lumbar stenosis with neurogenic claudication.  2.  Mechanical low back pain.     Treatment Options:   Given her significant difficulties I recommended decompressive laminectomies at L3-4 and L4-5.  This is likely to help with her claudication symptoms but it is not a panacea for all of her mechanical neck and back pain.  Formal cardiac clearance has been obtained and she has continued her aspirin but has held her Plavix in the perioperative period.  The nature of the procedure as well as the potential risks, complications, limitations, and alternatives to the procedure were discussed at length with the patient and the patient has agreed to proceed with surgery.    Gale Reyes, APRN   10/17/2019   6:39 AM

## 2019-10-17 NOTE — OP NOTE
NEUROSURGICAL OPERATIVE NOTE        PREOPERATIVE DIAGNOSIS:    Lumbar spinal stenosis with neurogenic claudication      POSTOPERATIVE DIAGNOSIS:  Same      PROCEDURE:  L3-4 and L4-5 laminectomies with medial facetectomies and foraminotomies      SURGEON:  Dionicio Pollard M.D.      ASSISTANT: Vic Palafox PA-C      ANESTHESIA:  General      ESTIMATED BLOOD LOSS: 40 mL      SPECIMEN: None      DRAINS: 7 flat DEBBI      COMPLICATIONS:  None      CLINICAL NOTE:  The patient is a 74 old woman with a history of back and bilateral lower extremity pain with walking and standing intolerance.  The symptoms have been ongoing despite time and nonoperative measures.  Studies demonstrate significant lumbar stenosis and as such she presents at this time for lumbar decompression.  The nature of the procedure as well as the potential risks, complications, limitations, and alternatives to the procedure were discussed at length with the patient and the patient has agreed to proceed with surgery.      TECHNICAL NOTE:  The patient was brought to the operating room and while on her cart general endotracheal anesthesia was achieved. She was then turned prone onto the Cloward saddle frame and special care was ensured to protect pressure points. Her low back was prepared and draped in usual fashion. A localizing radiograph was obtained with a spinal needle in the lumbosacral midline utilizing the C-arm. Based on this, a several-centimeter vertical incision was fashioned. Underlying tissues were divided with cautery to provide exposure to the posterior spinal elements at L3-L4 and L4-L5. Another radiograph confirmed the operative level. Leksell rongeur was utilized to remove the spinous processes at L3, L4, and the upper aspect of L5. The drill was utilized to fashion a central trough which was then widened using the drill and an array of Kerrison punches. There was markedly overgrown ligament. Facet complexes were undercut. The neural  foramina were decompressed. At completion of the procedure an angled ball probe could be passed along the nerve roots bilaterally at each level. Bleeding points were controlled with bone wax and Floseal. With the Valsalva maneuver at the end of the procedure there was no evidence of CSF leak or significant bleeding. The wound was washed out with an antibiotic-containing solution. A #7 flat DEBBI drain was brought in through a separate stab incision and left in the epidural space. The paraspinous muscle and fascia were reapproximated in interrupted fashion with 0 Vicryl suture and 0.25% Marcaine was instilled into the paraspinous musculature and subcutaneous tissues. Subcutaneous tissues were closed in layers with 2-0 followed by 3-0 Vicryl suture and the skin was closed in a running subcuticular fashion with 3-0 Vicryl sutures. Steri-Strips and a sterile dressing were applied. She was subsequently rolled onto her cart, extubated, and taken to the recovery room in satisfactory condition.             Dionicio Pollard M.D.

## 2019-10-17 NOTE — ANESTHESIA POSTPROCEDURE EVALUATION
Patient: Alysia Aguayo    Procedure Summary     Date:  10/17/19 Room / Location:   LOS OR  /  LOS OR    Anesthesia Start:  0657 Anesthesia Stop:  0836    Procedure:  LUMBAR LAMINECTOMY L3-5 (N/A Spine Lumbar) Diagnosis:       Lumbar stenosis with neurogenic claudication      (Lumbar stenosis with neurogenic claudication [M48.062])    Surgeon:  Dionicio Pollard MD Provider:  Angel Luis Estrada MD    Anesthesia Type:  general ASA Status:  3          Anesthesia Type: general  Last vitals  BP   109/61 (10/17/19 0900)   Temp   98.1 °F (36.7 °C) (10/17/19 0900)   Pulse   69 (10/17/19 0900)   Resp   18 (10/17/19 0900)     SpO2   100 % (10/17/19 0900)     Post Anesthesia Care and Evaluation    Patient location during evaluation: PACU  Patient participation: complete - patient participated  Level of consciousness: awake and alert  Pain score: 0  Pain management: adequate  Airway patency: patent  Anesthetic complications: No anesthetic complications  PONV Status: none  Cardiovascular status: hemodynamically stable and acceptable  Respiratory status: nonlabored ventilation, acceptable and nasal cannula  Hydration status: acceptable

## 2019-10-17 NOTE — ANESTHESIA PREPROCEDURE EVALUATION
Anesthesia Evaluation     Patient summary reviewed and Nursing notes reviewed   NPO Solid Status: > 8 hours  NPO Liquid Status: < 2 hours           Airway   Mallampati: I  TM distance: >3 FB  Neck ROM: full  No difficulty expected  Dental    (+) edentulous    Pulmonary    (+) a smoker (2000) Former,   (-) COPD, asthma, shortness of breath, recent URI, pulmonary embolism  Cardiovascular     ECG reviewed    (+) hypertension, CAD, cardiac stents hyperlipidemia,   (-) angina    ROS comment: EKG NSR 2014   NSR NS ST abn 2019   Cleared by cardiologist Dr Dayanna Gomez to hold plavix    Neuro/Psych  (+) headaches,     (-) seizures, CVA    ROS Comment: L stenosis N claudication DDD   GI/Hepatic/Renal/Endo    (+)  GERD,  renal disease,     Musculoskeletal     (+) back pain, neck pain (cervical spondy ),   Abdominal    Substance History   (+) alcohol use,      OB/GYN          Other   (+) arthritis     ROS/Med Hx Other: OK'd to stop Plavix per Cards                   Anesthesia Plan    ASA 3     general   (Propofol Infusion as part of Anti PONV tech )  intravenous induction   Anesthetic plan, all risks, benefits, and alternatives have been provided, discussed and informed consent has been obtained with: patient.    Plan discussed with CRNA.

## 2019-10-18 LAB
ANION GAP SERPL CALCULATED.3IONS-SCNC: 6 MMOL/L (ref 5–15)
BASOPHILS # BLD AUTO: 0.01 10*3/MM3 (ref 0–0.2)
BASOPHILS NFR BLD AUTO: 0.1 % (ref 0–1.5)
BUN BLD-MCNC: 14 MG/DL (ref 8–23)
BUN/CREAT SERPL: 13.5 (ref 7–25)
CALCIUM SPEC-SCNC: 8.4 MG/DL (ref 8.6–10.5)
CHLORIDE SERPL-SCNC: 107 MMOL/L (ref 98–107)
CO2 SERPL-SCNC: 23 MMOL/L (ref 22–29)
CREAT BLD-MCNC: 1.04 MG/DL (ref 0.57–1)
DEPRECATED RDW RBC AUTO: 51.3 FL (ref 37–54)
EOSINOPHIL # BLD AUTO: 0 10*3/MM3 (ref 0–0.4)
EOSINOPHIL NFR BLD AUTO: 0 % (ref 0.3–6.2)
ERYTHROCYTE [DISTWIDTH] IN BLOOD BY AUTOMATED COUNT: 13.3 % (ref 12.3–15.4)
GFR SERPL CREATININE-BSD FRML MDRD: 52 ML/MIN/1.73
GLUCOSE BLD-MCNC: 134 MG/DL (ref 65–99)
HCT VFR BLD AUTO: 29.5 % (ref 34–46.6)
HCT VFR BLD AUTO: 29.5 % (ref 34–46.6)
HGB BLD-MCNC: 9.4 G/DL (ref 12–15.9)
HGB BLD-MCNC: 9.4 G/DL (ref 12–15.9)
IMM GRANULOCYTES # BLD AUTO: 0.04 10*3/MM3 (ref 0–0.05)
IMM GRANULOCYTES NFR BLD AUTO: 0.4 % (ref 0–0.5)
LYMPHOCYTES # BLD AUTO: 1.06 10*3/MM3 (ref 0.7–3.1)
LYMPHOCYTES NFR BLD AUTO: 10.4 % (ref 19.6–45.3)
MCH RBC QN AUTO: 33.2 PG (ref 26.6–33)
MCHC RBC AUTO-ENTMCNC: 31.9 G/DL (ref 31.5–35.7)
MCV RBC AUTO: 104.2 FL (ref 79–97)
MONOCYTES # BLD AUTO: 0.9 10*3/MM3 (ref 0.1–0.9)
MONOCYTES NFR BLD AUTO: 8.8 % (ref 5–12)
NEUTROPHILS # BLD AUTO: 8.21 10*3/MM3 (ref 1.7–7)
NEUTROPHILS NFR BLD AUTO: 80.3 % (ref 42.7–76)
NRBC BLD AUTO-RTO: 0 /100 WBC (ref 0–0.2)
PLATELET # BLD AUTO: 246 10*3/MM3 (ref 140–450)
PMV BLD AUTO: 9.1 FL (ref 6–12)
POTASSIUM BLD-SCNC: 4.1 MMOL/L (ref 3.5–5.2)
RBC # BLD AUTO: 2.83 10*6/MM3 (ref 3.77–5.28)
SODIUM BLD-SCNC: 136 MMOL/L (ref 136–145)
WBC NRBC COR # BLD: 10.22 10*3/MM3 (ref 3.4–10.8)

## 2019-10-18 PROCEDURE — 63710000001 FLUOXETINE 20 MG CAPSULE: Performed by: NEUROLOGICAL SURGERY

## 2019-10-18 PROCEDURE — 85014 HEMATOCRIT: CPT | Performed by: PHYSICIAN ASSISTANT

## 2019-10-18 PROCEDURE — 63710000001 ATORVASTATIN 40 MG TABLET: Performed by: NEUROLOGICAL SURGERY

## 2019-10-18 PROCEDURE — A9270 NON-COVERED ITEM OR SERVICE: HCPCS | Performed by: NEUROLOGICAL SURGERY

## 2019-10-18 PROCEDURE — 63710000001 ASPIRIN 81 MG CHEWABLE TABLET: Performed by: NEUROLOGICAL SURGERY

## 2019-10-18 PROCEDURE — 63710000001 OXYCODONE-ACETAMINOPHEN 7.5-325 MG TABLET: Performed by: NEUROLOGICAL SURGERY

## 2019-10-18 PROCEDURE — G0378 HOSPITAL OBSERVATION PER HR: HCPCS

## 2019-10-18 PROCEDURE — 63710000001 FAMOTIDINE 20 MG TABLET: Performed by: NEUROLOGICAL SURGERY

## 2019-10-18 PROCEDURE — 85018 HEMOGLOBIN: CPT | Performed by: PHYSICIAN ASSISTANT

## 2019-10-18 PROCEDURE — 85025 COMPLETE CBC W/AUTO DIFF WBC: CPT | Performed by: PHYSICIAN ASSISTANT

## 2019-10-18 PROCEDURE — 80048 BASIC METABOLIC PNL TOTAL CA: CPT | Performed by: PHYSICIAN ASSISTANT

## 2019-10-18 PROCEDURE — 99024 POSTOP FOLLOW-UP VISIT: CPT | Performed by: NEUROLOGICAL SURGERY

## 2019-10-18 RX ADMIN — SODIUM CHLORIDE 1000 ML: 9 INJECTION, SOLUTION INTRAVENOUS at 04:57

## 2019-10-18 RX ADMIN — FAMOTIDINE 20 MG: 20 TABLET ORAL at 07:52

## 2019-10-18 RX ADMIN — FAMOTIDINE 20 MG: 20 TABLET ORAL at 17:10

## 2019-10-18 RX ADMIN — SODIUM CHLORIDE, PRESERVATIVE FREE 3 ML: 5 INJECTION INTRAVENOUS at 09:55

## 2019-10-18 RX ADMIN — FLUOXETINE HYDROCHLORIDE 40 MG: 20 CAPSULE ORAL at 20:33

## 2019-10-18 RX ADMIN — SODIUM CHLORIDE, PRESERVATIVE FREE 3 ML: 5 INJECTION INTRAVENOUS at 20:33

## 2019-10-18 RX ADMIN — ASPIRIN 81 MG 81 MG: 81 TABLET ORAL at 07:52

## 2019-10-18 RX ADMIN — OXYCODONE HYDROCHLORIDE AND ACETAMINOPHEN 1 TABLET: 7.5; 325 TABLET ORAL at 15:57

## 2019-10-18 RX ADMIN — ATORVASTATIN CALCIUM 40 MG: 40 TABLET, FILM COATED ORAL at 20:33

## 2019-10-18 RX ADMIN — OXYCODONE HYDROCHLORIDE AND ACETAMINOPHEN 1 TABLET: 7.5; 325 TABLET ORAL at 03:44

## 2019-10-18 RX ADMIN — OXYCODONE HYDROCHLORIDE AND ACETAMINOPHEN 1 TABLET: 7.5; 325 TABLET ORAL at 20:43

## 2019-10-18 RX ADMIN — OXYCODONE HYDROCHLORIDE AND ACETAMINOPHEN 1 TABLET: 7.5; 325 TABLET ORAL at 10:46

## 2019-10-18 NOTE — PROGRESS NOTES
NEUROSURGERY PROGRESS NOTE     LOS: 0 days   Patient Care Team:  Yesica Zhu PA as PCP - General  Dionicio Pollard MD as Consulting Physician (Neurosurgery)  Heriberto Lopes MD as Consulting Physician (Pain Medicine)  Varinder Alan MD as Consulting Physician (Cardiology)    Chief Complaint: Low back pain with walking and standing intolerance.    POD#: 1 Day Post-Op  Procedures:  L3-4 and L4-5 laminectomies.    Interval History:   Her blood pressure is running a little low but apparently her primary care provider increased her antihypertensive medications prior to surgery.  Patient Complaints: Mild incisional pain.  Patient Denies: Voiding difficulty or preoperative leg pain with walking.    Vital Signs: Blood pressure (!) 82/65, pulse 71, temperature 98.4 °F (36.9 °C), temperature source Oral, resp. rate 16, SpO2 98 %, not currently breastfeeding.  Intake/Output:     Intake/Output Summary (Last 24 hours) at 10/18/2019 0537  Last data filed at 10/18/2019 0457  Gross per 24 hour   Intake 2760 ml   Output 2750 ml   Net 10 ml     Drain output: 60/50 mL.    Physical Exam:  The patient is awake and alert.  She appears quite comfortable.  Motor function is intact in her lower extremities.  Dry dressing is in place on her incision.     Assessment/Plan:  1.  Lumbar stenosis with neurogenic claudication status post decompression.  2.  Disposition: Mobilize patient.  Hold anti-hypertensives.  I anticipate that the patient will be discharged home tomorrow.      Dionicio Pollard MD  10/18/19  5:37 AM

## 2019-10-18 NOTE — PROGRESS NOTES
Discharge Planning Assessment  [facility]     Patient Name: Alysia Aguayo  MRN: [id number]  Today's Date: 10/18/2019    Admit Date: 10/17/2019    Discharge Needs Assessment     Row Name 10/18/19 1318       Living Environment    Lives With  child(holly), adult    Name(s) of Who Lives With Patient  Daughter:  Gale    Current Living Arrangements  home/apartment/condo    Family Caregiver if Needed  child(holly), adult    Quality of Family Relationships  helpful;involved;supportive    Able to Return to Prior Arrangements  yes    Living Arrangement Comments  Ms. Aguayo lives with her adult daughter in a one story home, 2 step to enter, in [facility],       Resource/Environmental Concerns    Transportation Concerns  car, none       Transition Planning    Patient/Family Anticipates Transition to  home with family    Transportation Anticipated  family or friend will provide       Discharge Needs Assessment    Readmission Within the Last 30 Days  no previous admission in last 30 days    Equipment Currently Used at Home  cane, straight    Equipment Needed After Discharge  none        Discharge Plan     Row Name 10/18/19 5662       Plan    Plan  Home with family assistance    Patient/Family in Agreement with Plan  yes    Plan Comments  Met with Ms. Aguayo at the bedside for discharge planning.  Ms. Aguayo is ambulating with a rolling walker.  She denies any DME needs.  She stated that prior to admission, she would use a cane when she was outdoor, but otherwise, ambulated independently.  Ms. Aguayo stated that she would have sufficient assistance from her daughter, Gale, when she returned home.  No needs identified.  Gale will be transporting the patient home when discharged.    CM will continue to follow.    Final Discharge Disposition Code  01 - home or self-care        Destination      No service coordination in this encounter.      Durable Medical Equipment      No service coordination in this encounter.       Dialysis/Infusion      No service coordination in this encounter.      Home Medical Care      No service coordination in this encounter.      Therapy      No service coordination in this encounter.      Community Resources      No service coordination in this encounter.          Demographic Summary     Row Name 10/18/19 1313       General Information    Admission Type  observation    Arrived From  home    Reason for Consult  discharge planning    General Information Comments  PCP:  Yesica Zhu       Contact Information    Permission Granted to Share Info With      Contact Information Comments  [place]:  Gale Garcia,  [phone]        Functional Status     Row Name 10/18/19 1314       Functional Status    Usual Activity Tolerance  moderate    Current Activity Tolerance  good       Functional Status, IADL    Medications  independent    Meal Preparation  independent    Housekeeping  independent    Laundry  independent    Shopping  independent       Mental Status    General Appearance WDL  WDL        Psychosocial    No documentation.       Abuse/Neglect    No documentation.       Legal     Row Name 10/18/19 7218       Financial/Legal    Who Manages Finances if Patient Unable  No advance directives.    Finance Comments  Ms. Aguayo has prescription drug coverage with Medicare and [facility].  Verified insurance with patient.        Substance Abuse    No documentation.       Patient Forms    No documentation.           Chely Armendariz RN

## 2019-10-18 NOTE — PLAN OF CARE
Problem: Patient Care Overview  Goal: Plan of Care Review   10/18/19 0105   Coping/Psychosocial   Plan of Care Reviewed With patient   Plan of Care Review   Progress improving   OTHER   Outcome Summary Pt ambulated 700ft assist x1 gait belt and walker, PRN percocet given q4hrs for pain, pt refused ice pack. Denies n/t. VSS, 2L while sleeping. Voids with out difficulty, SL at 0000, adequate PO intake. Moderate amount of drainage from DEBBI (less than 100ml this shift). No PT/OT or CM orders present, pt voiced interest in a raised toliet and use of a walker while at home. Dr Pollard following. Possible d/c home with family 10-18, pending DEBBI drainage amount. Fluids restarted at 0330 post low BP reading, pt asymptomatic, BP rechecked in an hour, even lower, sarkis reading 75/40, Javy JOHNSON called, stat labs and 1L NS bolus given over two hours ordered. Dr Pollard aware at 0545, discontinued her LR and lisinopril. SBP increasing to 80's, bolus will be completed by 0700, pt remains asymptomatic, will continue to monitor.

## 2019-10-19 VITALS
HEART RATE: 80 BPM | DIASTOLIC BLOOD PRESSURE: 55 MMHG | OXYGEN SATURATION: 96 % | SYSTOLIC BLOOD PRESSURE: 136 MMHG | TEMPERATURE: 98.6 F | RESPIRATION RATE: 16 BRPM

## 2019-10-19 PROCEDURE — A9270 NON-COVERED ITEM OR SERVICE: HCPCS | Performed by: NEUROLOGICAL SURGERY

## 2019-10-19 PROCEDURE — 63710000001 OXYCODONE-ACETAMINOPHEN 7.5-325 MG TABLET: Performed by: NEUROLOGICAL SURGERY

## 2019-10-19 PROCEDURE — G0378 HOSPITAL OBSERVATION PER HR: HCPCS

## 2019-10-19 PROCEDURE — 63710000001 FAMOTIDINE 20 MG TABLET: Performed by: NEUROLOGICAL SURGERY

## 2019-10-19 PROCEDURE — 99024 POSTOP FOLLOW-UP VISIT: CPT | Performed by: NEUROLOGICAL SURGERY

## 2019-10-19 PROCEDURE — 63710000001 ASPIRIN 81 MG CHEWABLE TABLET: Performed by: NEUROLOGICAL SURGERY

## 2019-10-19 RX ORDER — CLOPIDOGREL BISULFATE 75 MG/1
75 TABLET ORAL DAILY
Start: 2019-10-23 | End: 2020-01-06

## 2019-10-19 RX ORDER — OXYCODONE HYDROCHLORIDE AND ACETAMINOPHEN 5; 325 MG/1; MG/1
1 TABLET ORAL 3 TIMES DAILY PRN
Qty: 25 TABLET | Refills: 0 | Status: SHIPPED | OUTPATIENT
Start: 2019-10-19 | End: 2020-01-06

## 2019-10-19 RX ADMIN — OXYCODONE HYDROCHLORIDE AND ACETAMINOPHEN 1 TABLET: 7.5; 325 TABLET ORAL at 12:12

## 2019-10-19 RX ADMIN — ASPIRIN 81 MG 81 MG: 81 TABLET ORAL at 09:17

## 2019-10-19 RX ADMIN — SODIUM CHLORIDE, PRESERVATIVE FREE 3 ML: 5 INJECTION INTRAVENOUS at 09:17

## 2019-10-19 RX ADMIN — OXYCODONE HYDROCHLORIDE AND ACETAMINOPHEN 1 TABLET: 7.5; 325 TABLET ORAL at 07:22

## 2019-10-19 RX ADMIN — FAMOTIDINE 20 MG: 20 TABLET ORAL at 05:44

## 2019-10-19 NOTE — PLAN OF CARE
Problem: Pain, Chronic (Adult)  Goal: Identify Related Risk Factors and Signs and Symptoms  Outcome: Outcome(s) achieved Date Met: 10/19/19    Goal: Acceptable Pain/Comfort Level and Functional Ability  Outcome: Outcome(s) achieved Date Met: 10/19/19   10/19/19 0950   Pain, Chronic (Adult)   Acceptable Pain/Comfort Level and Functional Ability achieves outcome       Problem: Fall Risk (Adult)  Goal: Identify Related Risk Factors and Signs and Symptoms  Outcome: Outcome(s) achieved Date Met: 10/19/19   10/19/19 0356   Fall Risk (Adult)   Related Risk Factors (Fall Risk) fatigue/slow reaction;sleep pattern alteration;environment unfamiliar   Signs and Symptoms (Fall Risk) presence of risk factors     Goal: Absence of Fall  Outcome: Outcome(s) achieved Date Met: 10/19/19   10/19/19 0950   Fall Risk (Adult)   Absence of Fall achieves outcome       Problem: Laminectomy/Foraminotomy/Discectomy (Adult)  Goal: Signs and Symptoms of Listed Potential Problems Will be Absent, Minimized or Managed (Laminectomy/Foraminotomy/Discectomy)  Outcome: Outcome(s) achieved Date Met: 10/19/19   10/19/19 0356   Goal/Outcome Evaluation   Problems Assessed (Laminectomy/Laminotomy/Discectomy) all   Problems Present (Laminectomy/otomy) functional decline/self-care deficit;pain

## 2019-10-19 NOTE — PLAN OF CARE
Problem: Patient Care Overview  Goal: Plan of Care Review  Outcome: Ongoing (interventions implemented as appropriate)   10/19/19 0350   Coping/Psychosocial   Plan of Care Reviewed With patient;daughter   Plan of Care Review   Progress improving   OTHER   Outcome Summary Pt VSS and AOx4. Uneventful shift. Pt pain managed with PO pain medication. Continuing to montior.       Problem: Pain, Chronic (Adult)  Goal: Identify Related Risk Factors and Signs and Symptoms  Outcome: Ongoing (interventions implemented as appropriate)    Goal: Acceptable Pain/Comfort Level and Functional Ability  Outcome: Ongoing (interventions implemented as appropriate)      Problem: Fall Risk (Adult)  Goal: Identify Related Risk Factors and Signs and Symptoms  Outcome: Ongoing (interventions implemented as appropriate)    Goal: Absence of Fall  Outcome: Ongoing (interventions implemented as appropriate)      Problem: Laminectomy/Foraminotomy/Discectomy (Adult)  Goal: Signs and Symptoms of Listed Potential Problems Will be Absent, Minimized or Managed (Laminectomy/Foraminotomy/Discectomy)  Outcome: Ongoing (interventions implemented as appropriate)

## 2019-10-19 NOTE — PROGRESS NOTES
NEUROSURGERY PROGRESS NOTE     LOS: 0 days   Patient Care Team:  Yesica Zhu PA as PCP - General  Dionicio Pollard MD as Consulting Physician (Neurosurgery)  Heriberto Lopes MD as Consulting Physician (Pain Medicine)  Varinder Alan MD as Consulting Physician (Cardiology)    Chief Complaint: Low back pain with walking and standing intolerance.    POD#: 2 Days Post-Op  Procedures:  L3-4 and L4-5 laminectomies.    Interval History:   Patient Complaints: Incisional pain.  Patient Denies: Voiding difficulty or preoperative claudication symptoms.    Vital Signs: Blood pressure 106/61, pulse 67, temperature 98.7 °F (37.1 °C), temperature source Oral, resp. rate 16, SpO2 97 %, not currently breastfeeding.  Intake/Output:     Intake/Output Summary (Last 24 hours) at 10/19/2019 0717  Last data filed at 10/19/2019 0400  Gross per 24 hour   Intake 150 ml   Output 4340 ml   Net -4190 ml     Drain output: 80/60 mL.    Physical Exam:  The patient is awake and alert.  She is in good spirits.  Dry dressing is in place on her incision.  Motor function is intact in her lower extremities.     Assessment/Plan:  1.  Lumbar stenosis with neurogenic claudication status post decompression.  2.  Hypertension: Given low blood pressure we will stop her antihypertensive medication.  She will follow-up with her primary care provider in 7-10 days to reassess the dosing or need for those medicines.  2.  Disposition: Mobilize patient.  Home today.  Follow-up with PAMIREYA in office in approximately 3 weeks.    Dionicio Pollard MD  10/19/19  7:17 AM

## 2019-11-19 ENCOUNTER — OFFICE VISIT (OUTPATIENT)
Dept: NEUROSURGERY | Facility: CLINIC | Age: 74
End: 2019-11-19

## 2019-11-19 VITALS — RESPIRATION RATE: 16 BRPM | TEMPERATURE: 98.3 F | WEIGHT: 167.6 LBS | HEIGHT: 63 IN | BODY MASS INDEX: 29.7 KG/M2

## 2019-11-19 DIAGNOSIS — M48.062 LUMBAR STENOSIS WITH NEUROGENIC CLAUDICATION: Primary | ICD-10-CM

## 2019-11-19 PROCEDURE — 99024 POSTOP FOLLOW-UP VISIT: CPT | Performed by: PHYSICIAN ASSISTANT

## 2019-11-19 NOTE — PROGRESS NOTES
Patient: Alysia Aguayo  : 1945  Gender: female    Primary Care Provider: Yesica Zhu PA      Chief Complaint:   Chief Complaint   Patient presents with   • Post-op       History of Present Illness:  Alysia Aguayo is a 74-year-old unemployed woman who is known to Dr. Pollard's service.  She underwent a right L1-2 discectomy in  and did well.  More recently she presented with symptoms of back and bilateral lower extremity pain with associated standing/walking intolerance.  Studies demonstrated significant lumbar stenosis at L3-4 and L4-5.  As such she underwent L3-4 and L4-5 laminectomies with medial facetectomies and foraminotomies on 10/17/2019.  There were no overt intraoperative complications noted.  She was discharged on the same operative day in satisfactory condition.    Ms. Aguayo presents today approximately 3 weeks postop.  She reports excellent relief of back and lower extremity pain symptoms.  Additionally she is ambulating more efficiently without symptoms of neurogenic claudication.  She has weaned herself from pain medication without issue.  Overall she is doing well with no other complaints at this time.      Past Medical and Surgical History:  Past Medical History:   Diagnosis Date   • Arthritis    • Back pain    • Coronary artery disease    • GERD (gastroesophageal reflux disease)    • History of alcoholism (CMS/[facility])    • Hyperlipidemia    • Hypertension    • Kidney disease     was stage 4 but taking excessive amounts of ibuprofen, f/u with nephrologist at the time, resolved since and was to see nephrologist as needed    • Neck pain    • Spastic colon    • Wears dentures    • Wears reading eyeglasses      Past Surgical History:   Procedure Laterality Date   • CARDIAC CATHETERIZATION  2010    x2    • CATARACT EXTRACTION Bilateral    • CHOLECYSTECTOMY     • COLONOSCOPY  2016   • CORONARY STENT PLACEMENT  2010    x3    • ESOPHAGEAL DILATATION     • HYSTERECTOMY     • LUMBAR  DISCECTOMY  07/10/2014    Rt- L1/2 Dr. Dionicio Pollard    • LUMBAR LAMINECTOMY DISCECTOMY DECOMPRESSION N/A 10/17/2019    Procedure: LUMBAR LAMINECTOMY L3-5;  Surgeon: Dionicio Pollard MD;  Location: [facility];  Service: Neurosurgery   • TUBAL ABDOMINAL LIGATION         Current Medications:    Current Outpatient Medications:   •  acetaminophen (TYLENOL) 325 MG tablet, Take 650 mg by mouth Every 4 (Four) Hours As Needed for Mild Pain  or Moderate Pain ., Disp: , Rfl:   •  aspirin (ASPIRIN 81) 81 MG chewable tablet, Chew 81 mg Daily., Disp: , Rfl:   •  atorvastatin (LIPITOR) 40 MG tablet, Take 40 mg by mouth Daily., Disp: , Rfl:   •  clopidogrel (PLAVIX) 75 MG tablet, Take 1 tablet by mouth Daily., Disp: , Rfl:   •  diclofenac (VOLTAREN) 1 % gel gel, Apply 4 g topically to the appropriate area as directed 4 (Four) Times a Day., Disp: 100 g, Rfl: 5  •  FLUoxetine (PROZAC) 40 MG capsule, Take 40 mg by mouth Daily., Disp: , Rfl:   •  oxyCODONE-acetaminophen (PERCOCET) 5-325 MG per tablet, Take 1 tablet by mouth 3 (Three) Times a Day As Needed for pain., Disp: 25 tablet, Rfl: 0  •  ranitidine (ZANTAC) 150 MG capsule, Take 75 mg by mouth Every Evening., Disp: , Rfl:   •  tiZANidine (ZANAFLEX) 2 MG tablet, TAKE 1/2 TAB TID PRN MUSCLE SPASM (Patient taking differently: 1 mg Every 8 (Eight) Hours As Needed for Muscle Spasms. TAKE 1/2 TAB TID PRN MUSCLE SPASM), Disp: 60 tablet, Rfl: 5    Allergies:  Allergies   Allergen Reactions   • Penicillins Hives   • Codeine Nausea And Vomiting   • Imodium A-D [Loperamide Hcl] Nausea And Vomiting         Review of Systems   Constitutional: Negative.    HENT: Negative.    Eyes: Negative.    Respiratory: Negative.    Cardiovascular: Negative.    Gastrointestinal: Negative.    Endocrine: Negative.    Genitourinary: Negative.    Musculoskeletal: Negative.    Skin: Negative.    Allergic/Immunologic: Negative.    Neurological: Negative.    Hematological: Negative.    Psychiatric/Behavioral:  "Negative.    All other systems reviewed and are negative.        Physical Exam  NEUROLOGICAL:  - A&Ox3  - Strength and ROM intact throughout  - Gait is normal  - Lumbar incision is well-healed.  No erythema, fluctuance or drainage noted.      Vitals:    11/19/19 1422   Resp: 16   Temp: 98.3 °F (36.8 °C)   TempSrc: Temporal   Weight: 76 kg (167 lb 9.6 oz)   Height: 160 cm (63\")           Assessment:  -Lumbar stenosis with neurogenic claudication  -S/p L3-4 and L4-5 laminectomies with medial facetectomies and foraminotomies on 10/17/2019    Plan:  Ms. Aguayo is seen today in a postoperative visit following uncomplicated laminectomies at L3-4 and L4-5 on 10/17/2019.  She is doing well postoperatively and is very pleased with her surgical outcome.  We discussed general restrictions moving forward and she will continue advancing activity as tolerated.  She would like to begin physical therapy at this time which I have provided an order for.  She will follow-up in 6 weeks with Dr. Pollard.        Demetrice Rocha PA-C  "

## 2020-01-06 ENCOUNTER — OFFICE VISIT (OUTPATIENT)
Dept: PAIN MEDICINE | Facility: CLINIC | Age: 75
End: 2020-01-06

## 2020-01-06 VITALS
HEIGHT: 63 IN | DIASTOLIC BLOOD PRESSURE: 84 MMHG | OXYGEN SATURATION: 98 % | WEIGHT: 166 LBS | RESPIRATION RATE: 24 BRPM | BODY MASS INDEX: 29.41 KG/M2 | HEART RATE: 70 BPM | SYSTOLIC BLOOD PRESSURE: 144 MMHG

## 2020-01-06 DIAGNOSIS — M51.36 DDD (DEGENERATIVE DISC DISEASE), LUMBAR: ICD-10-CM

## 2020-01-06 DIAGNOSIS — M54.81 OCCIPITAL NEURALGIA OF LEFT SIDE: ICD-10-CM

## 2020-01-06 DIAGNOSIS — M54.81 OCCIPITAL NEURALGIA OF LEFT SIDE: Primary | ICD-10-CM

## 2020-01-06 DIAGNOSIS — Z98.890 HISTORY OF LUMBAR LAMINECTOMY FOR SPINAL CORD DECOMPRESSION: ICD-10-CM

## 2020-01-06 DIAGNOSIS — Z95.5 HISTORY OF CORONARY ARTERY STENT PLACEMENT: ICD-10-CM

## 2020-01-06 DIAGNOSIS — M48.062 LUMBAR STENOSIS WITH NEUROGENIC CLAUDICATION: ICD-10-CM

## 2020-01-06 DIAGNOSIS — M47.812 CERVICAL SPONDYLOSIS WITHOUT MYELOPATHY: ICD-10-CM

## 2020-01-06 DIAGNOSIS — M79.18 MYOFASCIAL PAIN: ICD-10-CM

## 2020-01-06 PROCEDURE — 99214 OFFICE O/P EST MOD 30 MIN: CPT | Performed by: NURSE PRACTITIONER

## 2020-01-06 RX ORDER — HYDROCHLOROTHIAZIDE 12.5 MG/1
12.5 TABLET ORAL DAILY
COMMUNITY
Start: 2019-12-13 | End: 2022-05-31 | Stop reason: ALTCHOICE

## 2020-01-15 DIAGNOSIS — M47.812 CERVICAL SPONDYLOSIS WITHOUT MYELOPATHY: ICD-10-CM

## 2020-01-15 DIAGNOSIS — M54.81 OCCIPITAL NEURALGIA OF LEFT SIDE: Primary | ICD-10-CM

## 2020-01-15 DIAGNOSIS — M48.062 LUMBAR STENOSIS WITH NEUROGENIC CLAUDICATION: ICD-10-CM

## 2020-01-15 DIAGNOSIS — Z98.890 HISTORY OF LUMBAR LAMINECTOMY FOR SPINAL CORD DECOMPRESSION: ICD-10-CM

## 2020-01-15 DIAGNOSIS — M51.36 DDD (DEGENERATIVE DISC DISEASE), LUMBAR: ICD-10-CM

## 2020-01-15 DIAGNOSIS — M79.18 MYOFASCIAL PAIN: ICD-10-CM

## 2020-01-20 ENCOUNTER — OUTSIDE FACILITY SERVICE (OUTPATIENT)
Dept: PAIN MEDICINE | Facility: CLINIC | Age: 75
End: 2020-01-20

## 2020-01-20 PROCEDURE — 64405 NJX AA&/STRD GR OCPL NRV: CPT | Performed by: ANESTHESIOLOGY

## 2020-01-20 PROCEDURE — 20552 NJX 1/MLT TRIGGER POINT 1/2: CPT | Performed by: ANESTHESIOLOGY

## 2020-01-20 PROCEDURE — 76942 ECHO GUIDE FOR BIOPSY: CPT | Performed by: ANESTHESIOLOGY

## 2020-01-20 PROCEDURE — 99152 MOD SED SAME PHYS/QHP 5/>YRS: CPT | Performed by: ANESTHESIOLOGY

## 2020-01-21 ENCOUNTER — TELEPHONE (OUTPATIENT)
Dept: PAIN MEDICINE | Facility: CLINIC | Age: 75
End: 2020-01-21

## 2020-01-21 NOTE — TELEPHONE ENCOUNTER
LEFT GREATER OCCIPITAL NERVE BLOCKS & TPI (01/20/2020)  Attempted to call pt, was unsuccessful.   Voicemail was patient stating her name, therefore I left a message asking her to return my call to let me know how she was since her procedure. Also made pt aware that she has a f/u appt on 06/04/20.

## 2020-02-28 ENCOUNTER — TELEPHONE (OUTPATIENT)
Dept: PAIN MEDICINE | Facility: CLINIC | Age: 75
End: 2020-02-28

## 2020-02-28 NOTE — TELEPHONE ENCOUNTER
----- Message from HUE Valdivia sent at 1/31/2020 11:20 AM EST -----  Regarding: RE:  I understand, see if she can do some sort of exercise regimen at home, yoga, stretching etc two to three times a day. Ice followed by heat 3-4 times a day. I want to see if her right side gets better rather than prematurely giving her another injection. If she could even do one day of PT a week that would be good as well if she could get exercises to do at home that would help, but I understand transportation issues. Tell her to try those and call back in 3 weeks to see how she is. Thank you so much for calling her I appreciate you!  ----- Message -----  From: Shiloh Guillory  Sent: 1/31/2020  10:43 AM EST  To: HUE Valdivia    Just talked to patient , let her know what you recommended, she had already decided to not do PT because it would be to hard on her daughter to take her back and forth. I told her I would let you know and see if you had any other recommendations.  ----- Message -----  From: Alana Wang APRN  Sent: 1/31/2020   8:38 AM EST  To: Heriberto Lopes MD, Shiloh Guillory    She had more pain on the left side that is why we only did the left side. I would like for her to do PT first before coming back and see if the right side improves, she has an appt with me on March 10, this is ok for right now. I will see her after she has done PT for at least 2-3 weeks. Thanks.   ----- Message -----  From: Heriberto Lopes MD  Sent: 1/30/2020  11:37 AM EST  To: Alana Payan APRN    Please schedule RE-eval with Alana. Alana discussed facet injections with her, as well    Thanks  Dr OLIVIA    ----- Message -----  From: Shiloh Guillory  Sent: 1/30/2020  10:57 AM EST  To: Heriberto Lopes MD    Patient called to make an appt, she stated that when she had her procedure on 1/20 you only did left TPI and it was supposed to be bilateral sides.   She has previous had left in august but she did have bilateral  in July.  Pt would like to have the other side done as well.     What would you like me to do?

## 2020-09-20 NOTE — PROGRESS NOTES
"Chief Complaint: \"I did very well after my injections, but my pain is back and worse.\"     History of Present Illness: Ms. Alysia Aguayo, 75 y.o. female originally referred by Dr. Dionicio Pollard in consultation for chronic neck pain. Patient was last seen on January 20, 2020, when she underwent left greater occipital nerve block, combined with trigger point injection of the left levator scapulae, from which she reports experiencing 90% pain relief and functional improvement lasting about six months.  She reports recurrence of her pain to previous levels.  She did not participate in physical therapy, despite medical advice. Previously on September 11, 2019, she underwent diagnostic and therapeutic left greater occipital nerve block, combined with trigger point injection of the left levator scapulae, from which she reported experiencing 100% pain relief for 4 months.  She returns today for reevaluation of her chronic neck pain.    Pain history: Patient reports a 20+-year history of pain, which began after riding a roller coaster.   Pain description: constant pain with intermittent exacerbation, described as dull/tooth ache and throbbing sensation.   Radiation of pain: The left-sided neck pain radiates into the left shoulder blade into the left occipital region causing left occipital headaches  Pain intensity today: 7/10  Average pain intensity last week: 3/10  Pain intensity ranges from: 7/10 to 10/10  Aggravating factors:  Pain increases with flexion, extension and rotation of the cervical spine.   Alleviating factors: Tylenol, ice, heat, icy hot,     Associated symptoms:   Patient denies pain, numbness or weakness in the upper extremities. She reports an occurrence of numbness in her LUE on two occasions.  Patient denies any new bladder or bowel problems.   Patient denies difficulties with her balance or recent falls.   Patient reports left occipital headaches 3 times per week associated with her neck pain " lasting off and on throughout the day. (Previously daily headaches)     Review of previous therapies and additional medical records:  Alysia Aguayo has already failed the following measures, including:   Conservative measures: oral analgesics, physical therapy, ice and heat   Interventional measures: 01/20/2020: left greater occipital NB, and TPI at the left levator scapulae  09/11/2019: DxTx left greater occipital NB, and TPI at the left levator scapulae  07/24/2019: diagnostic and therapeutic cervical facet joint injections bilateral C5-C6 and C6-C7 combined with trigger point injections at the bilateral levator scapula and bilateral trapezius  Patient underwent diagnostic/therapeutic right cervical facet joint injections at C5-C6, C6-C7, combined with trigger point injections at the right trapezius, and right levator scapulae on 07/22/2015 and reports complete pain relief for almost 2 years.   Surgical measures: No history of cervical spine surgery  Alysia Aguayo underwent neurosurgical consultation with Dr. Dionicio Pollard on 05/12/2015, and was found not to be a surgical candidate for her neck.  Alysia Aguayo presents with significant comorbidities including depression, hypertension, hyperlipidemia, coronary artery disease; engaged in treatment.   In terms of current analgesics, Alysia Aguayo takes: Tylenol, tizanidine, Voltaren gel. Patient also takes Prozac  I have reviewed Karthik Report #[id number] consistent to medication reconciliation.    Global Pain Scale 08-21 2018 05-20 2019 07-18 2019 08-29 2019 01-06- 2020 09-20 2020     Pain 15 12 13 20 8 18     Feelings 0 0 2   4 3 4     Clinical outcomes 3 3 1   3 9 7     Activities 9 0 2 25 3 13     GPS Total: 27 15 18 52 23 42       Review of Diagnostic Studies:    MRI of the cervical spine without contrast on February 20, 2019, radiology report: Normal cervical alignment.  Vertebral heights are well-maintained.  Craniocervical junction is  unremarkable.  Mild to moderate degenerative disc disease in the lower cervical spine.  Bone marrow signal is within normal limits.  Visualized portions of the posterior fossa are unremarkable.  Cervical cord demonstrates normal course, caliber, and signal characteristics. Axial imaging:  C2-C3: Moderate facet arthrosis causing mild right neuroforaminal stenosis.  Left neuroforamina is preserved.  The central canal is intact.    C3-C4: Mild diffuse disc osteophytes and uncovertebral degenerative change on the left causing mild to moderate left foraminal stenosis.  Right neuroforaminal is preserved.  Mild canal stenosis  C4-C5: Mild diffuse disc osteophyte with moderate right facet arthrosis.  Mild bilateral neuroforaminal stenosis.  Mild canal stenosis  C5-C6: Diffuse disc osteophyte and uncovertebral degenerative change.  Advanced bilateral neuroforaminal stenosis and mild canal stenosis  C6-C7: Diffuse disc osteophyte and uncovertebral degenerative change causing at least mild central canal stenosis with mild impingement of the ventral cord and advanced bilateral neuroforaminal stenosis  C7-T1: No significant disc herniation or protrusion.  No canal stenosis or foraminal stenosis.  MRI of the lumbar spine without contrast on February 20, 2019, radiology report, alignment is normal.  Vertebral heights are well-maintained.  Normal bone marrow signal.  Conus medullaris is unremarkable.  L1-L2: Diffuse annular bulge and moderate facet arthrosis.  Right hemilaminectomy.  There is mild bilateral subarticular recess stenosis.  Moderate to advanced right foraminal stenosis.  L2-L3: Diffuse annular bulge and moderate facet arthrosis with left asymmetric posterolateral protrusion.  Moderate to advanced central canal stenosis and advanced left subarticular recess stenosis.  Mild bilateral neuroforaminal stenosis  L3-L4: Diffuse annular bulge and moderate facet arthrosis causing advanced central canal stenosis.  Mild right  neuroforaminal stenosis.  Broad-based left foraminal protrusion with advanced left foraminal stenosis with impingement of the exiting left L3 nerve root.  L4-L5: Diffuse annular bulge and moderate facet arthrosis causing moderate to advanced central canal stenosis.  Mild bilateral neuroforaminal stenosis  L5-S1: Diffuse annular bulge and moderate facet arthrosis resulting in mild bilateral subarticular recess stenosis.  Mild bilateral neuroforaminal stenosis  XR SPINE, LUMBAR, AP AND LATERAL WITH FLEXION AND EXTENSION-03/06/2019:  Extensive degenerative changes seen at the L1/L2 level. Slight scoliotic curvature with convexity to the right. Alignment stable in flexion and extension.  MRI Cervical Spine w/o Contrast on 04/28/2015: Cervical facet arthropathy. C5-C6, moderate to high-grade bilateral neural foraminal narrowing. C6-C7, C6-C7, moderate to high-grade bilateral neural foraminal narrowing.  X-Ray Cervical Spine 4 View on 04/21/2015: Mild to moderate degenerative changes worse at C6-C7. Mild anterior listhesis of C5 on C6.    Review of Systems   Musculoskeletal: Positive for neck pain and neck stiffness.   All other systems reviewed and are negative.     Patient Active Problem List   Diagnosis   • Cervical spondylosis without myelopathy   • Myofascial pain   • Frequent headaches   • History of alcohol abuse   • History of coronary artery stent placement   • History of lumbar laminectomy for spinal cord decompression   • Occipital neuralgia of left side   • Lumbar stenosis with neurogenic claudication   • DDD (degenerative disc disease), lumbar       Past Medical History:   Diagnosis Date   • Arthritis    • Back pain    • Coronary artery disease    • GERD (gastroesophageal reflux disease)    • History of alcoholism (CMS/[facility])    • Hyperlipidemia    • Hypertension    • Kidney disease     was stage 4 but taking excessive amounts of ibuprofen, f/u with nephrologist at the time, resolved since and was to see  nephrologist as needed    • Neck pain    • Spastic colon    • Wears dentures    • Wears reading eyeglasses          Past Surgical History:   Procedure Laterality Date   • CARDIAC CATHETERIZATION  2010    x2    • CATARACT EXTRACTION Bilateral    • CHOLECYSTECTOMY     • COLONOSCOPY  2016   • CORONARY STENT PLACEMENT  2010    x3    • ESOPHAGEAL DILATATION     • HYSTERECTOMY     • LUMBAR DISCECTOMY  07/10/2014    Rt- L1/2 Dr. Dionicio Pollard    • LUMBAR LAMINECTOMY DISCECTOMY DECOMPRESSION N/A 10/17/2019    Procedure: LUMBAR LAMINECTOMY L3-5;  Surgeon: Dionicio Pollard MD;  Location: [facility];  Service: Neurosurgery   • TUBAL ABDOMINAL LIGATION           Family History   Problem Relation Age of Onset   • Heart disease Mother    • Emphysema Mother    • Cancer Father          Social History     Socioeconomic History   • Marital status:      Spouse name: Not on file   • Number of children: Not on file   • Years of education: Not on file   • Highest education level: Not on file   Tobacco Use   • Smoking status: Former Smoker     Packs/day: 1.50     Years: 40.00     Pack years: 60.00     Types: Cigarettes     Quit date:      Years since quittin.7   • Smokeless tobacco: Never Used   Substance and Sexual Activity   • Alcohol use: No     Comment: hasn't used since , hx of alcoholism   • Drug use: No   • Sexual activity: Defer           Current Outpatient Medications:   •  acetaminophen (TYLENOL) 325 MG tablet, Take 650 mg by mouth Every 4 (Four) Hours As Needed for Mild Pain  or Moderate Pain ., Disp: , Rfl:   •  aspirin (ASPIRIN 81) 81 MG chewable tablet, Chew 81 mg Daily., Disp: , Rfl:   •  Aspirin Buf,CaCarb-MgCarb-MgO, 81 MG tablet, aspirin 81 mg tablet  Daily, Disp: , Rfl:   •  atorvastatin (LIPITOR) 40 MG tablet, Take 40 mg by mouth Daily., Disp: , Rfl:   •  diclofenac (VOLTAREN) 1 % gel gel, Apply 4 g topically to the appropriate area as directed 4 (Four) Times a Day., Disp: 100 g, Rfl: 5  •   "FLUoxetine (PROZAC) 40 MG capsule, Take 40 mg by mouth Daily., Disp: , Rfl:   •  hydroCHLOROthiazide (HYDRODIURIL) 12.5 MG tablet, Take 12.5 mg by mouth Daily., Disp: , Rfl:   •  lisinopril (PRINIVIL,ZESTRIL) 40 MG tablet, Take 40 mg by mouth Daily., Disp: , Rfl:   •  ranitidine (ZANTAC) 150 MG capsule, Take 75 mg by mouth Every Evening., Disp: , Rfl:   •  tiZANidine (ZANAFLEX) 2 MG tablet, TAKE 1/2 TAB TID PRN MUSCLE SPASM, Disp: 60 tablet, Rfl: 2      Allergies   Allergen Reactions   • Penicillins Hives   • Codeine Nausea And Vomiting   • Imodium A-D [Loperamide Hcl] Nausea And Vomiting         /60   Pulse 61   Temp 97.3 °F (36.3 °C)   Resp 16   Ht 160 cm (63\")   Wt 75.3 kg (166 lb)   SpO2 98%   BMI 29.41 kg/m²       Physical Exam:  Constitutional: Patient is oriented to person, place, and time. Patient appears well-developed and well-nourished.   HENT: Head: Normocephalic and atraumatic. Eyes: Conjunctivae and lids are normal. Pupils: Equal, round, reactive to light.   Neck: Trachea normal. Neck supple. No JVD present.   Lymphatic: No cervical adenopathy  Pulmonary Respiratory effort: No increased work of breathing or signs of respiratory distress. Auscultation of lungs: Clear to auscultation.   Cardiovascular Auscultation of heart: Normal rate and rhythm, normal S1 and S2, no murmurs.   Musculoskeletal   Gait and station: Gait evaluation demonstrated a normal gait   Cervical spine: Passive and active range of motion are almost full and but reproduce pain. Extension and rotation of the cervical spine increased and reproduced minimal pain. Cervical facet joint loading maneuvers are positive. Palpation of the left occipital and suboccipital region reproduces some pain  Muscles: Presence of active trigger points at the bilateral levator scapulae and bilateral trapezius   Shoulders: The range of motion of the glenohumeral joints is full and without pain. Rotator cuff strength is 5/5.   Lumbar spine: The " range of motion of the lumbar spine is limited due to recent surgery, but without pain.   Hip joints: The range of motion of the hip joints is full and without pain  Neurological: Patient is alert and oriented to person, place, and time. Speech: speech is normal. Cortical function: Normal mental status.   Cranial nerves: Cranial nerves 2-12 intact.   Reflex Scores:  Right brachioradialis: 2+  Left brachioradialis: 2+  Right biceps: 2+  Left biceps: 2+  Right triceps: 2+  Left triceps: 2+  Right patellar: 2+  Left patellar: 2+  Right achilles: 2+  Left achilles: 2+  Motor strength: 5/5  Motor Tone: normal tone.   Involuntary movements: none.   Superficial/Primitive Reflexes: primitive reflexes were absent.   Right Fox: absent  Left Fox: absent  Right ankle clonus: absent  Left ankle clonus: absent   Spurling sign is negative. Lhermitte sign is negative. Negative long tract signs. Straight leg raising test is negative.   Sensation: No sensory loss. Sensory exam: intact to light touch, intact pain and temperature sensation, intact vibration sensation and normal proprioception.   Coordination: Normal finger to nose and heel to shin. Normal balance and negative. Romberg's sign negative.   Skin and subcutaneous tissue: Skin is warm and intact. No rash noted. No cyanosis.   Psychiatric: Judgment and insight: Normal. Orientation to person, place and time: Normal. Recent and remote memory: Intact. Mood and affect: Normal.     ASSESSMENT:   1. Occipital neuralgia of left side    2. Myofascial pain    3. Cervical spondylosis without myelopathy    4. DDD (degenerative disc disease), lumbar    5. Lumbar stenosis with neurogenic claudication    6. History of lumbar laminectomy for spinal cord decompression    7. History of coronary artery stent placement         PLAN/MEDICAL DECISION MAKING: I had a lengthy conversation with Ms. Alysia Aguayo regarding her chronic pain condition and potential therapeutic options  including risks, benefits, alternative therapies, to name a few. Patient has failed to obtain pain relief with conservative measures, as referenced above. Patient underwent right cervical facet joint injections and reports complete ongoing pain relief on the right side of her neck, except for some residual myofascial pains. She continues to respond exceedingly well to interventional pain management procedures as referenced above.  I have reviewed all available patient's medical records as well as previous therapies as referenced above, and discussed the patient with Dr. Lopes.  Therefore, I have proposed the following plan:  1. Interventional pain management measures: Patient will be scheduled for repeat left greater occipital nerve block by hydrodissection technique under ultrasound and fluoroscopic guidance combined with trigger point injection at the bilateral levator scapulae and bilateral trapezius to maximize her rehabilitation. If her mechanical axial neck pain recurs, then, as previously discussed we may repeat bilateral cervical facet joint injections at C5-C6, C6-C7. If patient fails to obtain sustained pain relief, then, we will proceed with diagnostic bilateral cervical medial branch blocks at C4, C5, C6; for bilateral cervical facet joints at C5-C6, C6-C7 to clarify the origin of her unrelenting pain. If patient experiences more than 80% pain relief along with significant improvement in the range of motion of the cervical spine, then, patient will be scheduled for a second set of diagnostic bilateral cervical medial branch blocks, to then, proceed with cervical medial branch rhizotomies. I have also discussed with the patient the possibility of JULIA if she continues to struggle with pain (she no longer takes anticoagulants).  2. Pharmacological measures. Reviewed and Discussed.   A. Tylenol. Patient also takes Prozac  B. Continue Voltaren gel  C. Continue tizanidine 2 mg take ½ tablet three times a  day as needed for muscle spasms  D. Continue Rhuemate one capsule daily  3.  Long-term rehabilitation efforts:  A. The patient does not have a history of falls. I did complete a risk assessment for falls.   B. Patient will start a comprehensive physical therapy program for water therapy, upper body strengthening/posture correction, gait and balance training, neurodynamics, myofascial release, cupping and dry needling   C. Start an exercise program such as yoga and water therapy  D. Use TENS unit on regular basis  E. Contrast therapy: Apply ice-packs for 15-20 minutes, followed by heating pads for 15-20 minutes to affected area   4. The patient has been instructed to contact my office with any questions or difficulties. The patient understands the plan and agrees to proceed accordingly.       Patient Care Team:  Yesica Zhu PA as PCP - General  Dionicio Pollard MD as Consulting Physician (Neurosurgery)  Heriberto Lopes MD as Consulting Physician (Pain Medicine)  Varinder Alan MD as Consulting Physician (Cardiology)     New Medications Ordered This Visit   Medications   • tiZANidine (ZANAFLEX) 2 MG tablet     Sig: TAKE 1/2 TAB TID PRN MUSCLE SPASM     Dispense:  60 tablet     Refill:  2         Future Appointments   Date Time Provider Department Center   10/5/2020  9:45 AM Heriberto Lopes MD [facility] Dragon/Transcription disclaimer:  Much of this encounter note is an electronic transcription of spoken language to printed text. Electronic transcription of spoken language may permit erroneous, or at times, nonsensical words or phrases to be inadvertently transcribed. Although I have reviewed the note for such errors, some may still exist.

## 2020-09-21 ENCOUNTER — OFFICE VISIT (OUTPATIENT)
Dept: PAIN MEDICINE | Facility: CLINIC | Age: 75
End: 2020-09-21

## 2020-09-21 VITALS
WEIGHT: 166 LBS | BODY MASS INDEX: 29.41 KG/M2 | HEIGHT: 63 IN | SYSTOLIC BLOOD PRESSURE: 110 MMHG | OXYGEN SATURATION: 98 % | DIASTOLIC BLOOD PRESSURE: 60 MMHG | HEART RATE: 61 BPM | RESPIRATION RATE: 16 BRPM | TEMPERATURE: 97.3 F

## 2020-09-21 DIAGNOSIS — Z98.890 HISTORY OF LUMBAR LAMINECTOMY FOR SPINAL CORD DECOMPRESSION: ICD-10-CM

## 2020-09-21 DIAGNOSIS — M54.81 OCCIPITAL NEURALGIA OF LEFT SIDE: ICD-10-CM

## 2020-09-21 DIAGNOSIS — M79.18 MYOFASCIAL PAIN: ICD-10-CM

## 2020-09-21 DIAGNOSIS — Z95.5 HISTORY OF CORONARY ARTERY STENT PLACEMENT: ICD-10-CM

## 2020-09-21 DIAGNOSIS — M47.812 CERVICAL SPONDYLOSIS WITHOUT MYELOPATHY: ICD-10-CM

## 2020-09-21 DIAGNOSIS — M48.062 LUMBAR STENOSIS WITH NEUROGENIC CLAUDICATION: ICD-10-CM

## 2020-09-21 DIAGNOSIS — M51.36 DDD (DEGENERATIVE DISC DISEASE), LUMBAR: ICD-10-CM

## 2020-09-21 PROCEDURE — 99214 OFFICE O/P EST MOD 30 MIN: CPT | Performed by: NURSE PRACTITIONER

## 2020-09-21 RX ORDER — LISINOPRIL 40 MG/1
40 TABLET ORAL DAILY
COMMUNITY
Start: 2020-07-15 | End: 2022-06-29 | Stop reason: HOSPADM

## 2020-09-21 RX ORDER — TIZANIDINE 2 MG/1
TABLET ORAL
Qty: 60 TABLET | Refills: 2 | Status: SHIPPED | OUTPATIENT
Start: 2020-09-21 | End: 2021-04-12 | Stop reason: SDUPTHER

## 2020-10-05 ENCOUNTER — OUTSIDE FACILITY SERVICE (OUTPATIENT)
Dept: PAIN MEDICINE | Facility: CLINIC | Age: 75
End: 2020-10-05

## 2020-10-05 PROCEDURE — 64405 NJX AA&/STRD GR OCPL NRV: CPT | Performed by: ANESTHESIOLOGY

## 2020-10-05 PROCEDURE — 76942 ECHO GUIDE FOR BIOPSY: CPT | Performed by: ANESTHESIOLOGY

## 2020-10-05 PROCEDURE — 20553 NJX 1/MLT TRIGGER POINTS 3/>: CPT | Performed by: ANESTHESIOLOGY

## 2020-10-05 PROCEDURE — 99152 MOD SED SAME PHYS/QHP 5/>YRS: CPT | Performed by: ANESTHESIOLOGY

## 2020-10-06 ENCOUNTER — TELEPHONE (OUTPATIENT)
Dept: PAIN MEDICINE | Facility: CLINIC | Age: 75
End: 2020-10-06

## 2020-10-06 NOTE — TELEPHONE ENCOUNTER
left greater occipital nerve block by hydrodissection technique under ultrasound and fluoroscopic guidance combined with trigger point injection at the bilateral levator scapulae and bilateral trapezius 10/05/2020.    Spoke with patient. She reports she is doing well. She does c/o minimal pain but she is optimistic that it'll subside.   Patient aware of follow-up appointment on 02/23/2020 @ 11:30.  Card placed in outgoing mail.

## 2021-02-22 NOTE — PROGRESS NOTES
"This consultation was provided with the use of interactive audio through telephone that permits real time communication with the patient, as patient is unable to attend an in-office appointment due to the COVID-19 crisis. Consent for assessment and treatment was provided by the patient.  You have chosen to receive care through a telephone visit today. Do you consent to use a telephone visit for your medical care today?   Yes     Chief Complaint: \"I did very well after my injections, but my pain is back and worse.\"     History of Present Illness: Ms. Alysia Aguayo, 75 y.o. female originally referred by Dr. Dionicio Pollard in consultation for chronic neck pain. Patient was last seen on October 5, 2020, when she underwent left greater occipital nerve block  combined with trigger point injections at the bilateral levator scapulae and bilateral trapezius, from which she reports experiencing 70% pain relief lasting almost four months.  In addition, she reports her occipital headaches continue to decrease, now occurring once every other week, as before 3 times per week.  She did not participate in physical therapy, despite medical advice. She returns today for postprocedure follow up and evaluation.    Pain history: Patient reports a 20+-year history of pain, which began after riding a roller coaster.   Pain description: constant pain with intermittent exacerbation, described as dull/tooth ache and throbbing sensation.   Radiation of pain: The left-sided neck pain radiates into the left shoulder blade into the left occipital region causing left occipital headaches  Pain intensity today: 7/10  Average pain intensity last week: 3/10  Pain intensity ranges from: 7/10 to 10/10  Aggravating factors:  Pain increases with flexion, extension and rotation of the cervical spine.   Alleviating factors: Tylenol, ice, heat, icy hot,     Associated symptoms:   Patient denies pain, numbness or weakness in the upper extremities. She " reports an occurrence of numbness in her LUE on two occasions.  Patient denies any new bladder or bowel problems.   Patient denies difficulties with her balance or recent falls.   Patient reports left occipital headaches every other week associated with her neck pain lasting off and on throughout the day. (Previously daily headaches)     Review of previous therapies and additional medical records:  Alysia Aguayo has already failed the following measures, including:   Conservative measures: oral analgesics, physical therapy, ice and heat   Interventional measures: 10/05/2020: left greater occipital nerve block combined with trigger point injection at the bilateral levator scapulae and bilateral trapezius  01/20/2020: left greater occipital NB, and TPI at the left levator scapulae  09/11/2019: DxTx left greater occipital NB, and TPI at the left levator scapulae  07/24/2019: diagnostic and therapeutic cervical facet joint injections bilateral C5-C6 and C6-C7 combined with trigger point injections at the bilateral levator scapula and bilateral trapezius  Patient underwent diagnostic/therapeutic right cervical facet joint injections at C5-C6, C6-C7, combined with trigger point injections at the right trapezius, and right levator scapulae on 07/22/2015 and reports complete pain relief for almost 2 years.   Surgical measures: No history of cervical spine surgery  Alysia Aguayo underwent neurosurgical consultation with Dr. Dionicio Pollard on 05/12/2015, and was found not to be a surgical candidate for her neck.  Alysia Aguayo presents with significant comorbidities including depression, hypertension, hyperlipidemia, coronary artery disease; engaged in treatment.   In terms of current analgesics, Alysia Aguayo takes: Tylenol, tizanidine, Voltaren gel. Patient also takes Prozac  I have reviewed Karthik Report #[id number] consistent to medication reconciliation.    Global Pain Scale 08-21 2018 05-20 2019 07-18 2019  08-29 2019 01-06- 2020 09-20 2020     Pain 15 12 13 20 8 18     Feelings 0 0 2   4 3 4     Clinical outcomes 3 3 1   3 9 7     Activities 9 0 2 25 3 13     GPS Total: 27 15 18 52 23 42       Review of Diagnostic Studies:    MRI of the cervical spine without contrast on February 20, 2019, radiology report: Normal cervical alignment.  Vertebral heights are well-maintained.  Craniocervical junction is unremarkable.  Mild to moderate degenerative disc disease in the lower cervical spine.  Bone marrow signal is within normal limits.  Visualized portions of the posterior fossa are unremarkable.  Cervical cord demonstrates normal course, caliber, and signal characteristics. Axial imaging:  C2-C3: Moderate facet arthrosis causing mild right neuroforaminal stenosis.  Left neuroforamina is preserved.  The central canal is intact.    C3-C4: Mild diffuse disc osteophytes and uncovertebral degenerative change on the left causing mild to moderate left foraminal stenosis.  Right neuroforaminal is preserved.  Mild canal stenosis  C4-C5: Mild diffuse disc osteophyte with moderate right facet arthrosis.  Mild bilateral neuroforaminal stenosis.  Mild canal stenosis  C5-C6: Diffuse disc osteophyte and uncovertebral degenerative change.  Advanced bilateral neuroforaminal stenosis and mild canal stenosis  C6-C7: Diffuse disc osteophyte and uncovertebral degenerative change causing at least mild central canal stenosis with mild impingement of the ventral cord and advanced bilateral neuroforaminal stenosis  C7-T1: No significant disc herniation or protrusion.  No canal stenosis or foraminal stenosis.  MRI of the lumbar spine without contrast on February 20, 2019, radiology report, alignment is normal.  Vertebral heights are well-maintained.  Normal bone marrow signal.  Conus medullaris is unremarkable.  L1-L2: Diffuse annular bulge and moderate facet arthrosis.  Right hemilaminectomy.  There is mild bilateral subarticular recess  stenosis.  Moderate to advanced right foraminal stenosis.  L2-L3: Diffuse annular bulge and moderate facet arthrosis with left asymmetric posterolateral protrusion.  Moderate to advanced central canal stenosis and advanced left subarticular recess stenosis.  Mild bilateral neuroforaminal stenosis  L3-L4: Diffuse annular bulge and moderate facet arthrosis causing advanced central canal stenosis.  Mild right neuroforaminal stenosis.  Broad-based left foraminal protrusion with advanced left foraminal stenosis with impingement of the exiting left L3 nerve root.  L4-L5: Diffuse annular bulge and moderate facet arthrosis causing moderate to advanced central canal stenosis.  Mild bilateral neuroforaminal stenosis  L5-S1: Diffuse annular bulge and moderate facet arthrosis resulting in mild bilateral subarticular recess stenosis.  Mild bilateral neuroforaminal stenosis  XR SPINE, LUMBAR, AP AND LATERAL WITH FLEXION AND EXTENSION-03/06/2019:  Extensive degenerative changes seen at the L1/L2 level. Slight scoliotic curvature with convexity to the right. Alignment stable in flexion and extension.  MRI Cervical Spine w/o Contrast on 04/28/2015: Cervical facet arthropathy. C5-C6, moderate to high-grade bilateral neural foraminal narrowing. C6-C7, C6-C7, moderate to high-grade bilateral neural foraminal narrowing.  X-Ray Cervical Spine 4 View on 04/21/2015: Mild to moderate degenerative changes worse at C6-C7. Mild anterior listhesis of C5 on C6.    Review of Systems   Musculoskeletal: Positive for neck pain and neck stiffness.   All other systems reviewed and are negative.     Patient Active Problem List   Diagnosis   • Cervical spondylosis without myelopathy   • Myofascial pain   • Frequent headaches   • History of alcohol abuse   • History of coronary artery stent placement   • History of lumbar laminectomy for spinal cord decompression   • Occipital neuralgia of left side   • Lumbar stenosis with neurogenic claudication   •  DDD (degenerative disc disease), lumbar       Past Medical History:   Diagnosis Date   • Arthritis    • Back pain    • Coronary artery disease    • GERD (gastroesophageal reflux disease)    • History of alcoholism ([facility])    • Hyperlipidemia    • Hypertension    • Kidney disease     was stage 4 but taking excessive amounts of ibuprofen, f/u with nephrologist at the time, resolved since and was to see nephrologist as needed    • Neck pain    • Spastic colon    • Wears dentures    • Wears reading eyeglasses          Past Surgical History:   Procedure Laterality Date   • CARDIAC CATHETERIZATION  2010    x2    • CATARACT EXTRACTION Bilateral    • CHOLECYSTECTOMY     • COLONOSCOPY     • CORONARY STENT PLACEMENT  2010    x3    • ESOPHAGEAL DILATATION     • HYSTERECTOMY     • LUMBAR DISCECTOMY  07/10/2014    Rt- L1/2 Dr. Dionicio Pollard    • LUMBAR LAMINECTOMY DISCECTOMY DECOMPRESSION N/A 10/17/2019    Procedure: LUMBAR LAMINECTOMY L3-5;  Surgeon: Dionicio Pollard MD;  Location: [facility];  Service: Neurosurgery   • TUBAL ABDOMINAL LIGATION           Family History   Problem Relation Age of Onset   • Heart disease Mother    • Emphysema Mother    • Cancer Father          Social History     Socioeconomic History   • Marital status:      Spouse name: Not on file   • Number of children: Not on file   • Years of education: Not on file   • Highest education level: Not on file   Tobacco Use   • Smoking status: Former Smoker     Packs/day: 1.50     Years: 40.00     Pack years: 60.00     Types: Cigarettes     Quit date:      Years since quittin.1   • Smokeless tobacco: Never Used   Substance and Sexual Activity   • Alcohol use: No     Comment: hasn't used since , hx of alcoholism   • Drug use: No   • Sexual activity: Defer           Current Outpatient Medications:   •  acetaminophen (TYLENOL) 325 MG tablet, Take 650 mg by mouth Every 4 (Four) Hours As Needed for Mild Pain  or Moderate Pain ., Disp: , Rfl:    •  aspirin (ASPIRIN 81) 81 MG chewable tablet, Chew 81 mg Daily., Disp: , Rfl:   •  Aspirin Buf,CaCarb-MgCarb-MgO, 81 MG tablet, aspirin 81 mg tablet  Daily, Disp: , Rfl:   •  atorvastatin (LIPITOR) 40 MG tablet, Take 40 mg by mouth Daily., Disp: , Rfl:   •  diclofenac (VOLTAREN) 1 % gel gel, Apply 4 g topically to the appropriate area as directed 4 (Four) Times a Day., Disp: 100 g, Rfl: 5  •  hydroCHLOROthiazide (HYDRODIURIL) 12.5 MG tablet, Take 12.5 mg by mouth Daily., Disp: , Rfl:   •  lisinopril (PRINIVIL,ZESTRIL) 40 MG tablet, Take 40 mg by mouth Daily., Disp: , Rfl:   •  ranitidine (ZANTAC) 150 MG capsule, Take 75 mg by mouth Every Evening., Disp: , Rfl:   •  tiZANidine (ZANAFLEX) 2 MG tablet, TAKE 1/2 TAB TID PRN MUSCLE SPASM, Disp: 60 tablet, Rfl: 2  •  Diclofenac Sodium (VOLTAREN) 1 % gel gel, Apply 4 g topically to the appropriate area as directed 4 (Four) Times a Day As Needed (pain)., Disp: 4 g, Rfl: 5  •  FLUoxetine (PROZAC) 40 MG capsule, Take 40 mg by mouth Daily., Disp: , Rfl:       Allergies   Allergen Reactions   • Penicillins Hives   • Codeine Nausea And Vomiting   • Imodium A-D [Loperamide Hcl] Nausea And Vomiting         There were no vitals taken for this visit.      Due to the constraints of the telemedicine format, no physical exam was performed     Physical Exam: (Previous PE from last   Constitutional: Patient is oriented to person, place, and time. Patient appears well-developed and well-nourished.   HENT: Head: Normocephalic and atraumatic. Eyes: Conjunctivae and lids are normal. Pupils: Equal, round, reactive to light.   Neck: Trachea normal. Neck supple. No JVD present.   Lymphatic: No cervical adenopathy  Pulmonary Respiratory effort: No increased work of breathing or signs of respiratory distress. Auscultation of lungs: Clear to auscultation.   Cardiovascular Auscultation of heart: Normal rate and rhythm, normal S1 and S2, no murmurs.   Musculoskeletal   Gait and station: Gait  evaluation demonstrated a normal gait   Cervical spine: Passive and active range of motion are almost full and but reproduce pain. Extension and rotation of the cervical spine increased and reproduced minimal pain. Cervical facet joint loading maneuvers are positive. Palpation of the left occipital and suboccipital region reproduces some pain  Muscles: Presence of active trigger points at the bilateral levator scapulae and bilateral trapezius   Shoulders: The range of motion of the glenohumeral joints is full and without pain. Rotator cuff strength is 5/5.   Lumbar spine: The range of motion of the lumbar spine is limited due to recent surgery, but without pain.   Hip joints: The range of motion of the hip joints is full and without pain  Neurological: Patient is alert and oriented to person, place, and time. Speech: speech is normal. Cortical function: Normal mental status.   Cranial nerves: Cranial nerves 2-12 intact.   Reflex Scores:  Right brachioradialis: 2+  Left brachioradialis: 2+  Right biceps: 2+  Left biceps: 2+  Right triceps: 2+  Left triceps: 2+  Right patellar: 2+  Left patellar: 2+  Right achilles: 2+  Left achilles: 2+  Motor strength: 5/5  Motor Tone: normal tone.   Involuntary movements: none.   Superficial/Primitive Reflexes: primitive reflexes were absent.   Right Fox: absent  Left Fox: absent  Right ankle clonus: absent  Left ankle clonus: absent   Spurling sign is negative. Lhermitte sign is negative. Negative long tract signs. Straight leg raising test is negative.   Sensation: No sensory loss. Sensory exam: intact to light touch, intact pain and temperature sensation, intact vibration sensation and normal proprioception.   Coordination: Normal finger to nose and heel to shin. Normal balance and negative. Romberg's sign negative.   Skin and subcutaneous tissue: Skin is warm and intact. No rash noted. No cyanosis.   Psychiatric: Judgment and insight: Normal. Orientation to person,  place and time: Normal. Recent and remote memory: Intact. Mood and affect: Normal.     ASSESSMENT:   1. Occipital neuralgia of left side    2. Myofascial pain    3. Cervical spondylosis without myelopathy    4. History of lumbar laminectomy for spinal cord decompression    5. History of coronary artery stent placement    6. DDD (degenerative disc disease), lumbar         PLAN/MEDICAL DECISION MAKING: I had a lengthy conversation with Ms. Alysia Aguayo regarding her chronic pain condition and potential therapeutic options including risks, benefits, alternative therapies, to name a few. Patient has failed to obtain pain relief with conservative measures, as referenced above. Patient underwent right cervical facet joint injections and reports complete ongoing pain relief on the right side of her neck, except for some residual myofascial pains. She continues to respond exceedingly well to interventional pain management procedures as referenced above.  I have reviewed all available patient's medical records as well as previous therapies as referenced above, and discussed the patient with Dr. Lopes.  Therefore, I have proposed the following plan:  1. Interventional pain management measures: Patient will be scheduled for repeat left greater occipital nerve block by hydrodissection technique under ultrasound and fluoroscopic guidance combined with trigger point injection at the bilateral levator scapulae and bilateral trapezius to maximize her rehabilitation. If her mechanical axial neck pain recurs, then, as previously discussed we may repeat bilateral cervical facet joint injections at C5-C6, C6-C7. If patient fails to obtain sustained pain relief, then, we will proceed with diagnostic bilateral cervical medial branch blocks at C4, C5, C6; for bilateral cervical facet joints at C5-C6, C6-C7 to clarify the origin of her unrelenting pain. If patient experiences more than 80% pain relief along with significant  improvement in the range of motion of the cervical spine, then, patient will be scheduled for a second set of diagnostic bilateral cervical medial branch blocks, to then, proceed with cervical medial branch rhizotomies. I have also discussed with the patient the possibility of JULIA if she continues to struggle with pain (she no longer takes anticoagulants).  2. Pharmacological measures. Reviewed and Discussed.   A. Tylenol. Patient also takes Prozac  B. Continue Voltaren gel  C. Continue tizanidine 2 mg take ½ tablet three times a day as needed for muscle spasms  D. Continue Rhuemate one capsule daily  3.  Long-term rehabilitation efforts:  A. The patient does not have a history of falls. I did complete a risk assessment for falls.   B. Patient will start a comprehensive physical therapy program for water therapy, upper body strengthening/posture correction, gait and balance training, neurodynamics, myofascial release, cupping and dry needling   C. Start an exercise program such as yoga and water therapy  D. Use TENS unit on regular basis  E. Contrast therapy: Apply ice-packs for 15-20 minutes, followed by heating pads for 15-20 minutes to affected area   4. The patient has been instructed to contact my office with any questions or difficulties. The patient understands the plan and agrees to proceed accordingly.    Patient has been made aware that patient will continue to have access to telephone call, tele-health or e-visit, or in office visit if an emergent or urgent issue arises.     This visit was performed via Telehealth. Patient was advised to call us back or contact a primary care provider, Urgent Care or the Emergency Department if symptoms worsen or treatment provided does not resolve symptoms. Patient verbalizes understanding of medication dosage, comfort measures, instructions for treatment, and follow-up.    This visit has been scheduled as a phone visit to comply with patient safety concerns in  accordance with CDC recommendations. Total time of discussion was 7 minutes.       Patient Care Team:  Yesica Zhu PA as PCP - General  Dionicio Pollard MD as Consulting Physician (Neurosurgery)  Heriberto Lopes MD as Consulting Physician (Pain Medicine)  Varinder Alan MD as Consulting Physician (Cardiology)     No orders of the defined types were placed in this encounter.        No future appointments.      HUE Valdivia     EMR Dragon/Transcription disclaimer:  Much of this encounter note is an electronic transcription of spoken language to printed text. Electronic transcription of spoken language may permit erroneous, or at times, nonsensical words or phrases to be inadvertently transcribed. Although I have reviewed the note for such errors, some may still exist.

## 2021-02-25 ENCOUNTER — OFFICE VISIT (OUTPATIENT)
Dept: PAIN MEDICINE | Facility: CLINIC | Age: 76
End: 2021-02-25

## 2021-02-25 DIAGNOSIS — M47.812 CERVICAL SPONDYLOSIS WITHOUT MYELOPATHY: ICD-10-CM

## 2021-02-25 DIAGNOSIS — M54.81 OCCIPITAL NEURALGIA OF LEFT SIDE: ICD-10-CM

## 2021-02-25 DIAGNOSIS — Z98.890 HISTORY OF LUMBAR LAMINECTOMY FOR SPINAL CORD DECOMPRESSION: ICD-10-CM

## 2021-02-25 DIAGNOSIS — Z95.5 HISTORY OF CORONARY ARTERY STENT PLACEMENT: ICD-10-CM

## 2021-02-25 DIAGNOSIS — M79.18 MYOFASCIAL PAIN: ICD-10-CM

## 2021-02-25 DIAGNOSIS — M51.36 DDD (DEGENERATIVE DISC DISEASE), LUMBAR: ICD-10-CM

## 2021-02-25 DIAGNOSIS — M54.81 OCCIPITAL NEURALGIA OF LEFT SIDE: Primary | ICD-10-CM

## 2021-02-25 PROCEDURE — 99441 PR PHYS/QHP TELEPHONE EVALUATION 5-10 MIN: CPT | Performed by: NURSE PRACTITIONER

## 2021-03-09 DIAGNOSIS — M54.81 OCCIPITAL NEURALGIA OF LEFT SIDE: Primary | ICD-10-CM

## 2021-03-17 ENCOUNTER — OUTSIDE FACILITY SERVICE (OUTPATIENT)
Dept: PAIN MEDICINE | Facility: CLINIC | Age: 76
End: 2021-03-17

## 2021-03-17 PROCEDURE — 64405 NJX AA&/STRD GR OCPL NRV: CPT | Performed by: ANESTHESIOLOGY

## 2021-03-17 PROCEDURE — 76942 ECHO GUIDE FOR BIOPSY: CPT | Performed by: ANESTHESIOLOGY

## 2021-03-17 PROCEDURE — 99152 MOD SED SAME PHYS/QHP 5/>YRS: CPT | Performed by: ANESTHESIOLOGY

## 2021-03-17 PROCEDURE — 20553 NJX 1/MLT TRIGGER POINTS 3/>: CPT | Performed by: ANESTHESIOLOGY

## 2021-03-18 ENCOUNTER — TELEPHONE (OUTPATIENT)
Dept: PAIN MEDICINE | Facility: CLINIC | Age: 76
End: 2021-03-18

## 2021-03-18 NOTE — TELEPHONE ENCOUNTER
repeat left greater occipital nerve block by hydrodissection technique under ultrasound and fluoroscopic guidance combined with trigger point injection at the bilateral levator scapulae and bilateral trapezius 03/17/2021.    Spoke with patient to see how she is doing since her procedure yesterday. She reports she is doing well, she does c/o some soreness.  Patient aware of follow-up appointment on 10/13/2021.  Reminder card placed in outgoing mail.

## 2021-04-12 RX ORDER — TIZANIDINE 2 MG/1
TABLET ORAL
Qty: 60 TABLET | Refills: 2 | Status: SHIPPED | OUTPATIENT
Start: 2021-04-12 | End: 2022-03-07 | Stop reason: SDUPTHER

## 2021-05-25 ENCOUNTER — OFFICE VISIT (OUTPATIENT)
Dept: PAIN MEDICINE | Facility: CLINIC | Age: 76
End: 2021-05-25

## 2021-05-25 VITALS
OXYGEN SATURATION: 98 % | SYSTOLIC BLOOD PRESSURE: 145 MMHG | WEIGHT: 173.6 LBS | BODY MASS INDEX: 30.76 KG/M2 | DIASTOLIC BLOOD PRESSURE: 79 MMHG | HEIGHT: 63 IN | TEMPERATURE: 97.3 F

## 2021-05-25 DIAGNOSIS — Z95.5 HISTORY OF CORONARY ARTERY STENT PLACEMENT: ICD-10-CM

## 2021-05-25 DIAGNOSIS — M47.812 CERVICAL SPONDYLOSIS WITHOUT MYELOPATHY: Primary | ICD-10-CM

## 2021-05-25 DIAGNOSIS — M54.81 OCCIPITAL NEURALGIA OF LEFT SIDE: ICD-10-CM

## 2021-05-25 DIAGNOSIS — M47.812 CERVICAL SPONDYLOSIS WITHOUT MYELOPATHY: ICD-10-CM

## 2021-05-25 DIAGNOSIS — Z98.890 HISTORY OF LUMBAR LAMINECTOMY FOR SPINAL CORD DECOMPRESSION: ICD-10-CM

## 2021-05-25 DIAGNOSIS — M79.18 MYOFASCIAL PAIN: ICD-10-CM

## 2021-05-25 DIAGNOSIS — M51.36 DDD (DEGENERATIVE DISC DISEASE), LUMBAR: ICD-10-CM

## 2021-05-25 PROCEDURE — 99213 OFFICE O/P EST LOW 20 MIN: CPT | Performed by: NURSE PRACTITIONER

## 2021-05-25 RX ORDER — AMLODIPINE BESYLATE 5 MG/1
2.5 TABLET ORAL 2 TIMES DAILY
COMMUNITY

## 2021-05-27 DIAGNOSIS — M47.812 CERVICAL SPONDYLOSIS WITHOUT MYELOPATHY: Primary | ICD-10-CM

## 2021-06-09 ENCOUNTER — OUTSIDE FACILITY SERVICE (OUTPATIENT)
Dept: PAIN MEDICINE | Facility: CLINIC | Age: 76
End: 2021-06-09

## 2021-06-09 PROCEDURE — 99152 MOD SED SAME PHYS/QHP 5/>YRS: CPT | Performed by: ANESTHESIOLOGY

## 2021-06-09 PROCEDURE — 64490 INJ PARAVERT F JNT C/T 1 LEV: CPT | Performed by: ANESTHESIOLOGY

## 2021-06-09 PROCEDURE — 64491 INJ PARAVERT F JNT C/T 2 LEV: CPT | Performed by: ANESTHESIOLOGY

## 2021-06-10 ENCOUNTER — TELEPHONE (OUTPATIENT)
Dept: PAIN MEDICINE | Facility: CLINIC | Age: 76
End: 2021-06-10

## 2021-06-10 NOTE — TELEPHONE ENCOUNTER
Spoke with pt regarding yesterday’s procedure with Dr. Lopes. Pt stated they are doing well, with no complaints.  Advised that there’s not a follow up appointment, to call as needed.

## 2021-06-30 ENCOUNTER — TELEPHONE (OUTPATIENT)
Dept: PAIN MEDICINE | Facility: CLINIC | Age: 76
End: 2021-06-30

## 2021-06-30 NOTE — TELEPHONE ENCOUNTER
Called an spoke with patient regarding her ongoing chronic neck pain. She had cervical facet joint injections on 06/09/2021, and has failed to find pain relief. Unfortunately, she has never underwent physical therapy for her neck, but she does not drive and does not have reliable transportation to get to therapy. We discussed exercises, stretching, utilizing contrast therapy, and topical cream to see if this will help her ongoing symptoms. If her neck symptoms persist we may consider updating cervical MRI. She will keep me updated on her progress.

## 2022-01-27 ENCOUNTER — TELEPHONE (OUTPATIENT)
Dept: PAIN MEDICINE | Facility: CLINIC | Age: 77
End: 2022-01-27

## 2022-01-27 NOTE — TELEPHONE ENCOUNTER
Caller: PATIENT     Relationship to patient: SELF    Best call back number: [phone]    Chief complaint: NECK/BACK PAIN    Type of visit: INJECTIONS      Additional notes: PT. CALLING TO SEE IF SHE CAN GET SCHEDULED FOR INJECTIONS FOR HER NECK.   PLEASE CALL TO ADVISE.

## 2022-03-03 NOTE — PROGRESS NOTES
"Chief Complaint: \"Neck pain and headaches.\"     History of Present Illness: Ms. Alysia Aguayo, 76 y.o. female originally referred by Dr. Dionicio Pollard in consultation for chronic neck pain.  She was last seen on June 9, 2021, when she underwent cervical facet joint injections bilateral C5-C6 and bilateral C6-C7, from which she reports experiencing about 50-60% pain relief and functional improvement lasting about seven months.  She reports a recurrence of her symptoms over the past month.  Additionally she has a history of left-sided occipital neuralgia, from which on March 17, 2021, she underwent left greater occipital nerve blocks, combined with trigger point injections at the bilateral levator scapulae and bilateral trapezius, from which she reports experiencing significant pain relief and reduction in her headaches lasting almost one year.  Unfortunately, over the past several months her headaches and neck pain have began to recur.  She is currently experiencing daily occipital headaches. She did not participate in physical therapy. She returns today for reevaluation of her recurrent neck pain.    Pain history: Patient reports a 20+-year history of pain, which began after riding a roller coaster.   Pain description: constant pain with intermittent exacerbation, described as dull/tooth ache and throbbing sensation.   Radiation of pain: The neck pain radiates into the left occipital region and into bilateral shoulder blades (LT>RT)  Pain intensity today: 5/10  Average pain intensity last week: 7/10  Pain intensity ranges from: 5/10 to 8/10  Aggravating factors:  Pain increases with flexion, extension and rotation of the cervical spine.   Alleviating factors: Pain decreases with Tylenol, ice, heat, icy hot,   Associated symptoms:   Patient denies pain, numbness or weakness in the upper extremities. She reports an some intertmittent tingling in her LUE.  Patient denies any new bladder or bowel problems. "   Patient denies difficulties with her balance or recent falls.   Patient reports daily left occipital headaches associated with her neck pain lasting off and on throughout the day.      Review of previous therapies and additional medical records:  Alysia Aguayo has already failed the following measures, including:   Conservative measures: oral analgesics, physical therapy, ice and heat   Interventional measures: 03/17/2021: left greater occipital nerve block combined with trigger point injection at the bilateral levator scapulae and bilateral trapezius  10/05/2020: left greater occipital nerve block combined with trigger point injection at the bilateral levator scapulae and bilateral trapezius  01/20/2020: left greater occipital NB, and TPI at the left levator scapulae  09/11/2019: DxTx left greater occipital NB, and TPI at the left levator scapulae  07/24/2019: diagnostic and therapeutic cervical facet joint injections bilateral C5-C6 and C6-C7 combined with trigger point injections at the bilateral levator scapula and bilateral trapezius  Patient underwent diagnostic/therapeutic right cervical facet joint injections at C5-C6, C6-C7, combined with trigger point injections at the right trapezius, and right levator scapulae on 07/22/2015 and reports complete pain relief for almost 2 years.   Surgical measures: No history of cervical spine surgery  Alysia Aguayo underwent neurosurgical consultation with Dr. Dionicio Pollard on 05/12/2015, and was found not to be a surgical candidate for her neck.  Alysia Aguayo presents with significant comorbidities including depression, hypertension, hyperlipidemia, coronary artery disease; engaged in treatment.   In terms of current analgesics, Alysia Aguayo takes: Tylenol, tizanidine, Voltaren gel. Patient also takes Prozac  I have reviewed Karthik Report is consistent to medication reconciliation.    Global Pain Scale 08-21 2018 05-20 2019 07-18 2019 08-29 2019  01-06- 2020 09-20 2020 05-25 2021 03-07  2022       Pain 15 12 13 20 8 18 13 17       Feelings 0 0 2   4 3 4 1 8       Clinical outcomes 3 3 1   3 9 7 2 10       Activities 9 0 2 25 3 13 13 3       GPS Total: 27 15 18 52 23 42 29 38         Review of Diagnostic Studies:   There are no new recent diagnostics for review  MRI of the cervical spine without contrast on February 20, 2019, radiology report: Normal cervical alignment.  Vertebral heights are well-maintained.  Craniocervical junction is unremarkable.  Mild to moderate degenerative disc disease in the lower cervical spine.  Bone marrow signal is within normal limits.  Visualized portions of the posterior fossa are unremarkable.  Cervical cord demonstrates normal course, caliber, and signal characteristics.   C2-C3: Moderate facet arthrosis causing mild right neuroforaminal stenosis.  Left neuroforamina is preserved.  The central canal is intact.    C3-C4: Mild diffuse disc osteophytes and uncovertebral degenerative change on the left causing mild to moderate left foraminal stenosis.  Right neuroforaminal is preserved.  Mild canal stenosis  C4-C5: Mild diffuse disc osteophyte with moderate right facet arthrosis.  Mild bilateral neuroforaminal stenosis.  Mild canal stenosis  C5-C6: Diffuse disc osteophyte and uncovertebral degenerative change.  Advanced bilateral neuroforaminal stenosis and mild canal stenosis  C6-C7: Diffuse disc osteophyte and uncovertebral degenerative change causing at least mild central canal stenosis with mild impingement of the ventral cord and advanced bilateral neuroforaminal stenosis  C7-T1: No significant disc herniation or protrusion.  No canal stenosis or foraminal stenosis.  MRI of the lumbar spine without contrast on February 20, 2019, radiology report, alignment is normal.  Vertebral heights are well-maintained.  Normal bone marrow signal.  Conus medullaris is unremarkable.  L1-L2: Diffuse annular bulge and moderate facet  arthrosis.  Right hemilaminectomy.  There is mild bilateral subarticular recess stenosis.  Moderate to advanced right foraminal stenosis.  L2-L3: Diffuse annular bulge and moderate facet arthrosis with left asymmetric posterolateral protrusion.  Moderate to advanced central canal stenosis and advanced left subarticular recess stenosis.  Mild bilateral neuroforaminal stenosis  L3-L4: Diffuse annular bulge and moderate facet arthrosis causing advanced central canal stenosis.  Mild right neuroforaminal stenosis.  Broad-based left foraminal protrusion with advanced left foraminal stenosis with impingement of the exiting left L3 nerve root.  L4-L5: Diffuse annular bulge and moderate facet arthrosis causing moderate to advanced central canal stenosis.  Mild bilateral neuroforaminal stenosis  L5-S1: Diffuse annular bulge and moderate facet arthrosis resulting in mild bilateral subarticular recess stenosis.  Mild bilateral neuroforaminal stenosis  XR SPINE, LUMBAR, AP AND LATERAL WITH FLEXION AND EXTENSION-03/06/2019:  Extensive degenerative changes seen at the L1/L2 level. Slight scoliotic curvature with convexity to the right. Alignment stable in flexion and extension.  MRI Cervical Spine w/o Contrast on 04/28/2015: Cervical facet arthropathy. C5-C6, moderate to high-grade bilateral neural foraminal narrowing. C6-C7, C6-C7, moderate to high-grade bilateral neural foraminal narrowing.  X-Ray Cervical Spine 4 View on 04/21/2015: Mild to moderate degenerative changes worse at C6-C7. Mild anterior listhesis of C5 on C6.    Review of Systems   Musculoskeletal: Positive for neck pain and neck stiffness.   Neurological: Positive for headaches.   All other systems reviewed and are negative.     Patient Active Problem List   Diagnosis   • Cervical spondylosis without myelopathy   • Myofascial pain   • Frequent headaches   • History of alcohol abuse   • History of coronary artery stent placement   • History of lumbar laminectomy  for spinal cord decompression   • Occipital neuralgia of left side   • Lumbar stenosis with neurogenic claudication   • DDD (degenerative disc disease), lumbar       Past Medical History:   Diagnosis Date   • Arthritis    • Back pain    • Coronary artery disease    • GERD (gastroesophageal reflux disease)    • History of alcoholism (CMS/HCC)    • Hyperlipidemia    • Hypertension    • Kidney disease     was stage 4 but taking excessive amounts of ibuprofen, f/u with nephrologist at the time, resolved since and was to see nephrologist as needed    • Neck pain    • Spastic colon    • Wears dentures    • Wears reading eyeglasses          Past Surgical History:   Procedure Laterality Date   • CARDIAC CATHETERIZATION  2010    x2    • CATARACT EXTRACTION Bilateral    • CHOLECYSTECTOMY     • COLONOSCOPY     • CORONARY STENT PLACEMENT  2010    x3    • ESOPHAGEAL DILATATION     • HYSTERECTOMY     • LUMBAR DISCECTOMY  07/10/2014    Rt- L1/2 Dr. Dionicio Pollard    • LUMBAR LAMINECTOMY DISCECTOMY DECOMPRESSION N/A 10/17/2019    Procedure: LUMBAR LAMINECTOMY L3-5;  Surgeon: Dionicio Pollard MD;  Location: [facility];  Service: Neurosurgery   • TUBAL ABDOMINAL LIGATION           Family History   Problem Relation Age of Onset   • Heart disease Mother    • Emphysema Mother    • Cancer Father          Social History     Socioeconomic History   • Marital status:    Tobacco Use   • Smoking status: Former Smoker     Packs/day: 1.50     Years: 40.00     Pack years: 60.00     Types: Cigarettes     Quit date:      Years since quittin.1   • Smokeless tobacco: Never Used   Substance and Sexual Activity   • Alcohol use: No     Comment: hasn't used since , hx of alcoholism   • Drug use: No   • Sexual activity: Defer           Current Outpatient Medications:   •  acetaminophen (TYLENOL) 325 MG tablet, Take 650 mg by mouth Every 4 (Four) Hours As Needed for Mild Pain  or Moderate Pain ., Disp: , Rfl:   •  amLODIPine  "(NORVASC) 5 MG tablet, Take 5 mg by mouth Daily., Disp: , Rfl:   •  aspirin 81 MG chewable tablet, Chew 81 mg Daily., Disp: , Rfl:   •  atorvastatin (LIPITOR) 40 MG tablet, Take 40 mg by mouth Daily., Disp: , Rfl:   •  Diclofenac Sodium (VOLTAREN) 1 % gel gel, Apply 4 g topically to the appropriate area as directed 4 (Four) Times a Day As Needed (pain)., Disp: 4 g, Rfl: 5  •  FLUoxetine (PROzac) 40 MG capsule, Take 40 mg by mouth Daily., Disp: , Rfl:   •  glycopyrrolate (ROBINUL) 1 MG tablet, Take 1 mg by mouth 2 (Two) Times a Day., Disp: , Rfl:   •  lisinopril (PRINIVIL,ZESTRIL) 40 MG tablet, Take 40 mg by mouth Daily., Disp: , Rfl:   •  ranitidine (ZANTAC) 150 MG capsule, Take 75 mg by mouth Every Evening., Disp: , Rfl:   •  tiZANidine (ZANAFLEX) 2 MG tablet, TAKE 1/2 TAB TID PRN MUSCLE SPASM, Disp: 60 tablet, Rfl: 0  •  Aspirin Buf,CaCarb-MgCarb-MgO, 81 MG tablet, aspirin 81 mg tablet  Daily, Disp: , Rfl:   •  hydroCHLOROthiazide (HYDRODIURIL) 12.5 MG tablet, Take 12.5 mg by mouth Daily., Disp: , Rfl:       Allergies   Allergen Reactions   • Penicillins Hives   • Codeine Nausea And Vomiting   • Imodium A-D [Loperamide Hcl] Nausea And Vomiting         /77   Pulse 65   Temp 96.6 °F (35.9 °C)   Ht 160 cm (63\")   Wt 72.8 kg (160 lb 6.4 oz)   SpO2 98%   BMI 28.41 kg/m²         Physical Exam:   Constitutional: Patient is oriented to person, place, and time. Patient appears well-developed and well-nourished.   HENT: Head: Normocephalic and atraumatic. Eyes: Conjunctivae and lids are normal. Pupils: Equal, round, reactive to light.   Neck: Trachea normal. Neck supple. No JVD present.   Lymphatic: No cervical adenopathy  Pulmonary Respiratory effort: No increased work of breathing or signs of respiratory distress.   Musculoskeletal   Gait and station: Gait evaluation demonstrated a normal gait   Cervical spine: Passive and active range of motion are almost full and but reproduce pain. Flexion, extension and " rotation of the cervical spine increased and reproduced pain. Cervical facet joint loading maneuvers are positive. Palpation of the left occipital and suboccipital region reproduces pain  Muscles: Presence of active trigger points at the bilateral levator scapulae and bilateral trapezius   Shoulders: The range of motion of the glenohumeral joints is full and without pain, there is pain with ROM on the left. Rotator cuff strength is 5/5.   Neurological: Patient is alert and oriented to person, place, and time. Speech: speech is normal. Cortical function: Normal mental status.   Cranial nerves: Cranial nerves 2-12 intact.   Reflex Scores:  Right brachioradialis: 3+  Left brachioradialis: 3+  Right biceps: 3+  Left biceps: 3+  Right triceps: 3+  Left triceps: 3+  Motor strength: 5/5  Motor Tone: normal tone.   Involuntary movements: none.   Superficial/Primitive Reflexes: primitive reflexes were absent.   Right Fox: absent  Left Fox: absent  Right ankle clonus: absent  Left ankle clonus: absent   Spurling sign is negative. Lhermitte sign is negative. Negative long tract signs.  Sensation: No sensory loss. Sensory exam: intact to light touch, intact pain and temperature sensation, intact vibration sensation and normal proprioception.   Coordination: Normal finger to nose and heel to shin. Normal balance and negative. Romberg's sign negative.   Skin and subcutaneous tissue: Skin is warm and intact. No rash noted. No cyanosis.   Psychiatric: Judgment and insight: Normal. Orientation to person, place and time: Normal. Recent and remote memory: Intact. Mood and affect: Normal.     ASSESSMENT:   1. Occipital neuralgia of left side    2. Myofascial pain    3. Cervical spondylosis without myelopathy    4. DDD (degenerative disc disease), lumbar    5. History of lumbar laminectomy for spinal cord decompression    6. History of coronary artery stent placement         PLAN/MEDICAL DECISION MAKING: I had a lengthy  conversation with Ms. Alysia Aguayo regarding her chronic pain condition and potential therapeutic options including risks, benefits, alternative therapies, to name a few. Patient has failed to obtain pain relief with conservative measures, as referenced above. She continues to respond exceedingly well to interventional pain management procedures as referenced above.  I have reviewed all available patient's medical records as well as previous therapies as referenced above.  Therefore, I have proposed the following plan:  1. Interventional pain management measures: Patient will be scheduled for left greater occipital nerve block by hydrodissection technique under ultrasound and fluoroscopic guidance combined with trigger point injections at the bilateral levator scapulae and bilateral trapezius to maximize her rehabilitation.  Depending on her continued response we may also consider repeating bilateral cervical facet joint injections at C5-C6, C6-C7. If patient fails to obtain sustained pain relief, then, we will proceed with diagnostic bilateral cervical medial branch blocks at C4, C5, C6; for bilateral cervical facet joints at C5-C6, C6-C7 to clarify the origin of her unrelenting pain. If patient experiences more than 80% pain relief along with significant improvement in the range of motion of the cervical spine, then, patient will be scheduled for a second set of diagnostic bilateral cervical medial branch blocks, to then, proceed with cervical medial branch rhizotomies. I have also discussed with the patient the possibility of JULIA if she continues to struggle with pain (she no longer takes anticoagulants).  2. Pharmacological measures. Reviewed and Discussed.   A. Tylenol. Patient also takes Prozac  B. Continue Voltaren gel  C. Continue tizanidine 2 mg take ½ tablet three times a day as needed for muscle spasms  D. Continue Rhuemate one capsule daily  3.  Long-term rehabilitation efforts:  A. The patient  does not have a history of falls. I did complete a risk assessment for falls.   B. Patient will start a comprehensive physical therapy program for water therapy, upper body strengthening/posture correction, gait and balance training, neurodynamics, myofascial release, cupping and dry needling   C. Start an exercise program such as yoga and water therapy  D. Use TENS unit on regular basis  E. Contrast therapy: Apply ice-packs for 15-20 minutes, followed by heating pads for 15-20 minutes to affected area   4. The patient has been instructed to contact my office with any questions or difficulties. The patient understands the plan and agrees to proceed accordingly.         Patient Care Team:  Yesica Zhu PA-C as PCP - General  Dionicio Pollard MD as Consulting Physician (Neurosurgery)  Heriberto Lopes MD as Consulting Physician (Pain Medicine)  Varinder Alan MD as Consulting Physician (Cardiology)     New Medications Ordered This Visit   Medications   • tiZANidine (ZANAFLEX) 2 MG tablet     Sig: TAKE 1/2 TAB TID PRN MUSCLE SPASM     Dispense:  60 tablet     Refill:  0         Future Appointments   Date Time Provider Department Center   3/23/2022 10:45 AM Heriberto Lopes MD [facility]         [facility] [place]

## 2022-03-07 ENCOUNTER — OFFICE VISIT (OUTPATIENT)
Dept: PAIN MEDICINE | Facility: CLINIC | Age: 77
End: 2022-03-07

## 2022-03-07 VITALS
HEART RATE: 65 BPM | DIASTOLIC BLOOD PRESSURE: 77 MMHG | BODY MASS INDEX: 28.42 KG/M2 | SYSTOLIC BLOOD PRESSURE: 139 MMHG | TEMPERATURE: 96.6 F | WEIGHT: 160.4 LBS | HEIGHT: 63 IN | OXYGEN SATURATION: 98 %

## 2022-03-07 DIAGNOSIS — M54.81 OCCIPITAL NEURALGIA OF LEFT SIDE: Primary | ICD-10-CM

## 2022-03-07 DIAGNOSIS — M47.812 CERVICAL SPONDYLOSIS WITHOUT MYELOPATHY: ICD-10-CM

## 2022-03-07 DIAGNOSIS — M79.18 MYOFASCIAL PAIN: ICD-10-CM

## 2022-03-07 DIAGNOSIS — Z95.5 HISTORY OF CORONARY ARTERY STENT PLACEMENT: ICD-10-CM

## 2022-03-07 DIAGNOSIS — M51.36 DDD (DEGENERATIVE DISC DISEASE), LUMBAR: ICD-10-CM

## 2022-03-07 DIAGNOSIS — M54.81 OCCIPITAL NEURALGIA OF LEFT SIDE: ICD-10-CM

## 2022-03-07 DIAGNOSIS — Z98.890 HISTORY OF LUMBAR LAMINECTOMY FOR SPINAL CORD DECOMPRESSION: ICD-10-CM

## 2022-03-07 PROCEDURE — 99213 OFFICE O/P EST LOW 20 MIN: CPT | Performed by: NURSE PRACTITIONER

## 2022-03-07 RX ORDER — TIZANIDINE 2 MG/1
TABLET ORAL
Qty: 60 TABLET | Refills: 2 | Status: SHIPPED | OUTPATIENT
Start: 2022-03-07 | End: 2022-03-07 | Stop reason: SDUPTHER

## 2022-03-07 RX ORDER — TIZANIDINE 2 MG/1
TABLET ORAL
Qty: 60 TABLET | Refills: 2 | Status: SHIPPED | OUTPATIENT
Start: 2022-03-07 | End: 2022-03-07

## 2022-03-07 RX ORDER — GLYCOPYRROLATE 1 MG/1
1 TABLET ORAL 2 TIMES DAILY
COMMUNITY
Start: 2022-02-16 | End: 2022-12-07

## 2022-03-07 RX ORDER — TIZANIDINE 2 MG/1
TABLET ORAL
Qty: 60 TABLET | Refills: 0 | Status: SHIPPED | OUTPATIENT
Start: 2022-03-07 | End: 2022-06-29 | Stop reason: HOSPADM

## 2022-03-21 DIAGNOSIS — M54.81 OCCIPITAL NEURALGIA OF LEFT SIDE: Primary | ICD-10-CM

## 2022-03-23 ENCOUNTER — OUTSIDE FACILITY SERVICE (OUTPATIENT)
Dept: PAIN MEDICINE | Facility: CLINIC | Age: 77
End: 2022-03-23

## 2022-03-23 PROCEDURE — 76942 ECHO GUIDE FOR BIOPSY: CPT | Performed by: ANESTHESIOLOGY

## 2022-03-23 PROCEDURE — 64405 NJX AA&/STRD GR OCPL NRV: CPT | Performed by: ANESTHESIOLOGY

## 2022-03-23 PROCEDURE — 20553 NJX 1/MLT TRIGGER POINTS 3/>: CPT | Performed by: ANESTHESIOLOGY

## 2022-03-23 PROCEDURE — 99152 MOD SED SAME PHYS/QHP 5/>YRS: CPT | Performed by: ANESTHESIOLOGY

## 2022-03-24 ENCOUNTER — TELEPHONE (OUTPATIENT)
Dept: PAIN MEDICINE | Facility: CLINIC | Age: 77
End: 2022-03-24

## 2022-03-24 NOTE — TELEPHONE ENCOUNTER
Spoke with pt regarding yesterday’s procedure with Dr. Lopes. Pt stated they are doing well, with no complaints.  Advised that isn’t a follow up appointment scheduled, to call office as needed.

## 2022-05-31 ENCOUNTER — OFFICE VISIT (OUTPATIENT)
Dept: FAMILY MEDICINE CLINIC | Facility: CLINIC | Age: 77
End: 2022-05-31

## 2022-05-31 VITALS
WEIGHT: 153 LBS | HEIGHT: 63 IN | SYSTOLIC BLOOD PRESSURE: 124 MMHG | OXYGEN SATURATION: 96 % | DIASTOLIC BLOOD PRESSURE: 84 MMHG | BODY MASS INDEX: 27.11 KG/M2 | HEART RATE: 94 BPM

## 2022-05-31 DIAGNOSIS — I10 PRIMARY HYPERTENSION: ICD-10-CM

## 2022-05-31 DIAGNOSIS — E78.2 MIXED HYPERLIPIDEMIA: ICD-10-CM

## 2022-05-31 DIAGNOSIS — K58.0 IRRITABLE BOWEL SYNDROME WITH DIARRHEA: ICD-10-CM

## 2022-05-31 DIAGNOSIS — J01.00 ACUTE NON-RECURRENT MAXILLARY SINUSITIS: Primary | ICD-10-CM

## 2022-05-31 DIAGNOSIS — F33.1 MAJOR DEPRESSIVE DISORDER, RECURRENT, MODERATE: ICD-10-CM

## 2022-05-31 PROCEDURE — 99214 OFFICE O/P EST MOD 30 MIN: CPT | Performed by: PHYSICIAN ASSISTANT

## 2022-05-31 PROCEDURE — 36415 COLL VENOUS BLD VENIPUNCTURE: CPT | Performed by: PHYSICIAN ASSISTANT

## 2022-05-31 RX ORDER — DOXYCYCLINE HYCLATE 100 MG/1
100 CAPSULE ORAL 2 TIMES DAILY
Qty: 20 CAPSULE | Refills: 0 | Status: SHIPPED | OUTPATIENT
Start: 2022-05-31 | End: 2022-06-10

## 2022-05-31 RX ORDER — FLUOXETINE HYDROCHLORIDE 40 MG/1
40 CAPSULE ORAL DAILY
Qty: 30 CAPSULE | Refills: 0 | Status: SHIPPED | OUTPATIENT
Start: 2022-05-31 | End: 2022-06-29 | Stop reason: HOSPADM

## 2022-05-31 RX ORDER — FLUOXETINE HYDROCHLORIDE 40 MG/1
40 CAPSULE ORAL DAILY
Qty: 90 CAPSULE | Refills: 1 | Status: SHIPPED | OUTPATIENT
Start: 2022-05-31 | End: 2022-11-07 | Stop reason: SDUPTHER

## 2022-05-31 NOTE — PROGRESS NOTES
"Chief Complaint  Med Refill and Depression    Subjective          Alysia Aguayo presents to [facility]  Patient in today for medication recheck and refills.  States the fluoxetine continues to work well for her depression symptoms and would like to continue on the current dosage at this time.  Denies any side effects of medication.  She sees cardiology for her other medication.  She is here today fasting for her labs.  States overall has been feeling well.  She is due to see GI for her IBS with diarrhea symptoms but her GI is relocating and needs to get in with a new provider.   Declines mammogram.  States she does feel like has had a return of sinus infection symptoms with sinus pressure and nasal congestion, occasional cough and headache.   Denies any fever.  This has been ongoing the past several weeks.    Depression  Visit Type: follow-up  Patient presents with the following symptoms: depressed mood.  Patient is not experiencing: chest pain, palpitations, shortness of breath and thoughts of death.    URI   There has been no fever. Associated symptoms include congestion, coughing and sinus pain. Pertinent negatives include no abdominal pain, diarrhea, ear pain, nausea or vomiting.       Objective   Vital Signs:   /84   Pulse 94   Ht 160 cm (63\")   Wt 69.4 kg (153 lb)   SpO2 96%   BMI 27.10 kg/m²     Body mass index is 27.1 kg/m².    Review of Systems   Constitutional: Negative for fatigue.   HENT: Positive for congestion and sinus pressure. Negative for ear pain.    Respiratory: Positive for cough. Negative for shortness of breath.    Cardiovascular: Negative for palpitations.   Gastrointestinal: Negative for abdominal pain, diarrhea, nausea and vomiting.   Neurological: Positive for headache. Negative for dizziness.   Psychiatric/Behavioral: Positive for depressed mood.       Past History:  Medical History: has a past medical history of Arthritis, Back pain, Carotid " artery disease (HCC), Coronary artery disease, Depression, GERD (gastroesophageal reflux disease), History of alcoholism (HCC), Hyperlipidemia, Hypertension, Kidney disease, Neck pain, Spastic colon, Spinal stenosis, Wears dentures, and Wears reading eyeglasses.   Surgical History: has a past surgical history that includes Hysterectomy; Cholecystectomy; Tubal ligation; Lumbar discectomy (07/10/2014); Cataract extraction (Bilateral); Colonoscopy (2016); Esophageal dilation; Coronary stent placement (2010); Cardiac catheterization (2010); and lumbar laminectomy discectomy decompression (N/A, 10/17/2019).   Family History: family history includes Cancer in her father; Coronary artery disease in her mother; Emphysema in her mother; Heart disease in her mother; Lung cancer in her father.   Social History: reports that she quit smoking about 22 years ago. Her smoking use included cigarettes. She has a 60.00 pack-year smoking history. She has never used smokeless tobacco. She reports that she does not drink alcohol and does not use drugs.      Current Outpatient Medications:   •  acetaminophen (TYLENOL) 325 MG tablet, Take 650 mg by mouth Every 4 (Four) Hours As Needed for Mild Pain  or Moderate Pain ., Disp: , Rfl:   •  amLODIPine (NORVASC) 5 MG tablet, Take 5 mg by mouth Daily., Disp: , Rfl:   •  aspirin 81 MG chewable tablet, Chew 81 mg Daily., Disp: , Rfl:   •  atorvastatin (LIPITOR) 40 MG tablet, Take 40 mg by mouth Daily., Disp: , Rfl:   •  Diclofenac Sodium (VOLTAREN) 1 % gel gel, Apply 4 g topically to the appropriate area as directed 4 (Four) Times a Day As Needed (pain)., Disp: 4 g, Rfl: 5  •  FLUoxetine (PROzac) 40 MG capsule, Take 1 capsule by mouth Daily., Disp: 90 capsule, Rfl: 1  •  glycopyrrolate (ROBINUL) 1 MG tablet, Take 1 mg by mouth 2 (Two) Times a Day., Disp: , Rfl:   •  lisinopril (PRINIVIL,ZESTRIL) 40 MG tablet, Take 40 mg by mouth Daily., Disp: , Rfl:   •  ranitidine (ZANTAC) 150 MG capsule, Take  75 mg by mouth Every Evening., Disp: , Rfl:   •  tiZANidine (ZANAFLEX) 2 MG tablet, TAKE 1/2 TAB TID PRN MUSCLE SPASM, Disp: 60 tablet, Rfl: 0  •  doxycycline (VIBRAMYCIN) 100 MG capsule, Take 1 capsule by mouth 2 (Two) Times a Day for 10 days., Disp: 20 capsule, Rfl: 0  •  FLUoxetine (PROzac) 40 MG capsule, Take 1 capsule by mouth Daily., Disp: 30 capsule, Rfl: 0  •  hydroCHLOROthiazide (HYDRODIURIL) 12.5 MG tablet, Take 12.5 mg by mouth Daily., Disp: , Rfl:   Allergies: Penicillins, Codeine, and Imodium a-d [loperamide hcl]    Physical Exam  Constitutional:       Appearance: Normal appearance.   HENT:      Head:      Comments: Tender bilateral maxillary sinuses.      Right Ear: Tympanic membrane normal.      Left Ear: Tympanic membrane normal.      Mouth/Throat:      Pharynx: Oropharynx is clear.   Eyes:      Conjunctiva/sclera: Conjunctivae normal.      Pupils: Pupils are equal, round, and reactive to light.   Cardiovascular:      Rate and Rhythm: Normal rate and regular rhythm.      Heart sounds: Normal heart sounds.   Pulmonary:      Effort: Pulmonary effort is normal.      Breath sounds: Normal breath sounds.   Abdominal:      Palpations: Abdomen is soft.      Tenderness: There is no abdominal tenderness.   Neurological:      Mental Status: She is oriented to person, place, and time.   Psychiatric:         Mood and Affect: Mood normal.         Behavior: Behavior normal.             Assessment and Plan   Diagnoses and all orders for this visit:    1. Acute non-recurrent maxillary sinusitis (Primary)  Will start on doxycycline.  Nasal saline as needed.  Return to clinic if symptoms not improved.  2. Primary hypertension  -     CBC & Differential; Future  -     Comprehensive Metabolic Panel; Future  -     TSH; Future  -     CBC & Differential  -     Comprehensive Metabolic Panel  -     TSH  Patient to continue her current medication and follow-up as directed per cardiology.  Will check fasting labs today.  3.  Mixed hyperlipidemia  -     Lipid Panel; Future  -     Lipid Panel    4. Major depressive disorder, recurrent, moderate (HCC)  Patient is pleased with the current dosage of fluoxetine and will continue at this time.  Return to clinic prior to recheck as needed.  5. Irritable bowel syndrome with diarrhea  Will send referral to get in with GI.   Other orders  -     FLUoxetine (PROzac) 40 MG capsule; Take 1 capsule by mouth Daily.  Dispense: 90 capsule; Refill: 1  -     doxycycline (VIBRAMYCIN) 100 MG capsule; Take 1 capsule by mouth 2 (Two) Times a Day for 10 days.  Dispense: 20 capsule; Refill: 0  -     FLUoxetine (PROzac) 40 MG capsule; Take 1 capsule by mouth Daily.  Dispense: 30 capsule; Refill: 0            Follow Up   Return in about 6 months (around 11/30/2022).  Patient was given instructions and counseling regarding her condition or for health maintenance advice. Please see specific information pulled into the AVS if appropriate.     Yesica Zhu PA-C

## 2022-06-01 ENCOUNTER — TELEPHONE (OUTPATIENT)
Dept: FAMILY MEDICINE CLINIC | Facility: CLINIC | Age: 77
End: 2022-06-01

## 2022-06-01 LAB
ALBUMIN SERPL-MCNC: 4.2 G/DL (ref 3.7–4.7)
ALBUMIN/GLOB SERPL: 1.8 {RATIO} (ref 1.2–2.2)
ALP SERPL-CCNC: 104 IU/L (ref 44–121)
ALT SERPL-CCNC: 9 IU/L (ref 0–32)
AST SERPL-CCNC: 18 IU/L (ref 0–40)
BASOPHILS # BLD AUTO: 0.1 X10E3/UL (ref 0–0.2)
BASOPHILS NFR BLD AUTO: 1 %
BILIRUB SERPL-MCNC: 0.3 MG/DL (ref 0–1.2)
BUN SERPL-MCNC: 25 MG/DL (ref 8–27)
BUN/CREAT SERPL: 21 (ref 12–28)
CALCIUM SERPL-MCNC: 9 MG/DL (ref 8.7–10.3)
CHLORIDE SERPL-SCNC: 106 MMOL/L (ref 96–106)
CHOLEST SERPL-MCNC: 149 MG/DL (ref 100–199)
CO2 SERPL-SCNC: 18 MMOL/L (ref 20–29)
CREAT SERPL-MCNC: 1.21 MG/DL (ref 0.57–1)
EGFRCR SERPLBLD CKD-EPI 2021: 47 ML/MIN/1.73
EOSINOPHIL # BLD AUTO: 0.1 X10E3/UL (ref 0–0.4)
EOSINOPHIL NFR BLD AUTO: 2 %
ERYTHROCYTE [DISTWIDTH] IN BLOOD BY AUTOMATED COUNT: 13.5 % (ref 11.7–15.4)
GLOBULIN SER CALC-MCNC: 2.4 G/DL (ref 1.5–4.5)
GLUCOSE SERPL-MCNC: 106 MG/DL (ref 65–99)
HCT VFR BLD AUTO: 37.9 % (ref 34–46.6)
HDLC SERPL-MCNC: 53 MG/DL
HGB BLD-MCNC: 12.4 G/DL (ref 11.1–15.9)
IMM GRANULOCYTES # BLD AUTO: 0 X10E3/UL (ref 0–0.1)
IMM GRANULOCYTES NFR BLD AUTO: 0 %
LDLC SERPL CALC-MCNC: 74 MG/DL (ref 0–99)
LYMPHOCYTES # BLD AUTO: 2 X10E3/UL (ref 0.7–3.1)
LYMPHOCYTES NFR BLD AUTO: 32 %
MCH RBC QN AUTO: 31.8 PG (ref 26.6–33)
MCHC RBC AUTO-ENTMCNC: 32.7 G/DL (ref 31.5–35.7)
MCV RBC AUTO: 97 FL (ref 79–97)
MONOCYTES # BLD AUTO: 0.5 X10E3/UL (ref 0.1–0.9)
MONOCYTES NFR BLD AUTO: 8 %
NEUTROPHILS # BLD AUTO: 3.7 X10E3/UL (ref 1.4–7)
NEUTROPHILS NFR BLD AUTO: 57 %
PLATELET # BLD AUTO: 272 X10E3/UL (ref 150–450)
POTASSIUM SERPL-SCNC: 4.7 MMOL/L (ref 3.5–5.2)
PROT SERPL-MCNC: 6.6 G/DL (ref 6–8.5)
RBC # BLD AUTO: 3.9 X10E6/UL (ref 3.77–5.28)
SODIUM SERPL-SCNC: 139 MMOL/L (ref 134–144)
TRIGL SERPL-MCNC: 123 MG/DL (ref 0–149)
TSH SERPL DL<=0.005 MIU/L-ACNC: 1.61 UIU/ML (ref 0.45–4.5)
VLDLC SERPL CALC-MCNC: 22 MG/DL (ref 5–40)
WBC # BLD AUTO: 6.4 X10E3/UL (ref 3.4–10.8)

## 2022-06-01 NOTE — TELEPHONE ENCOUNTER
Please let her know her labs showed her fasting sugar at 106, like to keep less than 100, so please work on healthy, low-carb diet and exercise to help keep low.  There is also minimal elevation to her creatinine or kidney function at 1.21- stable compared to last few years.   Encourage good hydration.    Please let her know her labs are on her MyChart.  Thanks.

## 2022-06-16 ENCOUNTER — TELEPHONE (OUTPATIENT)
Dept: FAMILY MEDICINE CLINIC | Facility: CLINIC | Age: 77
End: 2022-06-16

## 2022-06-16 ENCOUNTER — TELEPHONE (OUTPATIENT)
Dept: PAIN MEDICINE | Facility: CLINIC | Age: 77
End: 2022-06-16

## 2022-06-16 NOTE — TELEPHONE ENCOUNTER
Pt is going on a cruise on July 1. That is making her extremely nervous. Due to her spastic colon, her nervousness is giving her bad diarrhea. Pt wants to know if you will have one of the doctors sign off to rx her some Ativan to help calm her nerves.   [facility]

## 2022-06-16 NOTE — TELEPHONE ENCOUNTER
Patient called to ask if she could be prescribed Ativan for an upcoming cruise that she is taking. Patient says that she has spastic colon and she is worried about it causing issues as she is nervous. Please advise.

## 2022-06-23 ENCOUNTER — OFFICE VISIT (OUTPATIENT)
Dept: FAMILY MEDICINE CLINIC | Facility: CLINIC | Age: 77
End: 2022-06-23

## 2022-06-23 VITALS
HEIGHT: 63 IN | WEIGHT: 156 LBS | DIASTOLIC BLOOD PRESSURE: 76 MMHG | BODY MASS INDEX: 27.64 KG/M2 | SYSTOLIC BLOOD PRESSURE: 138 MMHG

## 2022-06-23 DIAGNOSIS — F41.9 ANXIETY: Primary | ICD-10-CM

## 2022-06-23 PROCEDURE — 99213 OFFICE O/P EST LOW 20 MIN: CPT | Performed by: STUDENT IN AN ORGANIZED HEALTH CARE EDUCATION/TRAINING PROGRAM

## 2022-06-23 RX ORDER — HYDROXYZINE PAMOATE 25 MG/1
25 CAPSULE ORAL 3 TIMES DAILY PRN
Qty: 60 CAPSULE | Refills: 0 | Status: SHIPPED | OUTPATIENT
Start: 2022-06-23 | End: 2022-06-29 | Stop reason: HOSPADM

## 2022-06-23 RX ORDER — CLONAZEPAM 1 MG/1
1 TABLET ORAL 3 TIMES DAILY PRN
Qty: 9 TABLET | Refills: 0 | Status: SHIPPED | OUTPATIENT
Start: 2022-06-23 | End: 2022-07-12

## 2022-06-23 NOTE — PROGRESS NOTES
"Chief Complaint  Anxiety (Going on a cruise. She says she has spastic colon and wants anxiety meds)    Subjective          Alysia Aguayo presents to [facility]  History of Present Illness    She states that she is flying to [place] and then going on  A cruise. She states that she has anxiety with travel and is concerned because when her anxiety is Sinotar she has worsening spasticity in her colon, which causes her to have severe diarrhea.  She has been getting medication for anxiety in the past, but does not have any at this time.  She states the last time she needed it was several years ago.    Objective   Vital Signs:   /76 (BP Location: Left arm, Patient Position: Sitting, Cuff Size: Adult)   Ht 160 cm (63\")   Wt 70.8 kg (156 lb)   BMI 27.63 kg/m²     Body mass index is 27.63 kg/m².    Review of Systems    Past History:  Medical History: has a past medical history of Arthritis, Back pain, Carotid artery disease ([facility]), Coronary artery disease, Depression, GERD (gastroesophageal reflux disease), History of alcoholism ([facility]), Hyperlipidemia, Hypertension, Kidney disease, Neck pain, Spastic colon, Spinal stenosis, Wears dentures, and Wears reading eyeglasses.   Surgical History: has a past surgical history that includes Hysterectomy; Cholecystectomy; Tubal ligation; Lumbar discectomy (07/10/2014); Cataract extraction (Bilateral); Colonoscopy (2016); Esophageal dilation; Coronary stent placement (2010); Cardiac catheterization (2010); and lumbar laminectomy discectomy decompression (N/A, 10/17/2019).   Family History: family history includes Cancer in her father; Coronary artery disease in her mother; Emphysema in her mother; Heart disease in her mother; Lung cancer in her father.   Social History: reports that she quit smoking about 22 years ago. Her smoking use included cigarettes. She has a 60.00 pack-year smoking history. She has never used smokeless tobacco. She reports " that she does not drink alcohol and does not use drugs.      Current Outpatient Medications:   •  acetaminophen (TYLENOL) 325 MG tablet, Take 650 mg by mouth Every 4 (Four) Hours As Needed for Mild Pain  or Moderate Pain ., Disp: , Rfl:   •  amLODIPine (NORVASC) 5 MG tablet, Take 5 mg by mouth Daily., Disp: , Rfl:   •  aspirin 81 MG chewable tablet, Chew 81 mg Daily., Disp: , Rfl:   •  atorvastatin (LIPITOR) 40 MG tablet, Take 40 mg by mouth Daily., Disp: , Rfl:   •  clonazePAM (KlonoPIN) 1 MG tablet, Take 1 tablet by mouth 3 (Three) Times a Day As Needed for Anxiety., Disp: 9 tablet, Rfl: 0  •  Diclofenac Sodium (VOLTAREN) 1 % gel gel, Apply 4 g topically to the appropriate area as directed 4 (Four) Times a Day As Needed (pain)., Disp: 4 g, Rfl: 5  •  FLUoxetine (PROzac) 40 MG capsule, Take 1 capsule by mouth Daily., Disp: 90 capsule, Rfl: 1  •  FLUoxetine (PROzac) 40 MG capsule, Take 1 capsule by mouth Daily., Disp: 30 capsule, Rfl: 0  •  glycopyrrolate (ROBINUL) 1 MG tablet, Take 1 mg by mouth 2 (Two) Times a Day., Disp: , Rfl:   •  hydrOXYzine pamoate (Vistaril) 25 MG capsule, Take 1 capsule by mouth 3 (Three) Times a Day As Needed for Itching., Disp: 60 capsule, Rfl: 0  •  lisinopril (PRINIVIL,ZESTRIL) 40 MG tablet, Take 40 mg by mouth Daily., Disp: , Rfl:   •  ranitidine (ZANTAC) 150 MG capsule, Take 75 mg by mouth Every Evening., Disp: , Rfl:   •  tiZANidine (ZANAFLEX) 2 MG tablet, TAKE 1/2 TAB TID PRN MUSCLE SPASM, Disp: 60 tablet, Rfl: 0    Allergies: Penicillins, Codeine, and Imodium a-d [loperamide hcl]    Physical Exam  Constitutional:       General: She is not in acute distress.     Appearance: Normal appearance. She is not ill-appearing or toxic-appearing.   HENT:      Head: Normocephalic and atraumatic.   Pulmonary:      Effort: Pulmonary effort is normal. No respiratory distress.   Musculoskeletal:      Right lower leg: No edema.      Left lower leg: No edema.   Lymphadenopathy:      Cervical: No  cervical adenopathy.   Neurological:      General: No focal deficit present.      Mental Status: She is alert and oriented to person, place, and time.   Psychiatric:         Mood and Affect: Mood normal.         Thought Content: Thought content normal.          Result Review :                   Assessment and Plan    Diagnoses and all orders for this visit:    1. Anxiety (Primary)  Assessment & Plan:  Travel induced.  Discussed that we will do 3 days of clonazepam to be used sparingly.  Vistaril also given and she was advised to use this first.  Call with any new or worsening symptoms    Orders:  -     clonazePAM (KlonoPIN) 1 MG tablet; Take 1 tablet by mouth 3 (Three) Times a Day As Needed for Anxiety.  Dispense: 9 tablet; Refill: 0    Other orders  -     hydrOXYzine pamoate (Vistaril) 25 MG capsule; Take 1 capsule by mouth 3 (Three) Times a Day As Needed for Itching.  Dispense: 60 capsule; Refill: 0      Follow Up   No follow-ups on file.  Patient was given instructions and counseling regarding her condition or for health maintenance advice. Please see specific information pulled into the AVS if appropriate.     Shannon Guerra, DO

## 2022-06-23 NOTE — ASSESSMENT & PLAN NOTE
Travel induced.  Discussed that we will do 3 days of clonazepam to be used sparingly.  Vistaril also given and she was advised to use this first.  Call with any new or worsening symptoms

## 2022-06-27 ENCOUNTER — APPOINTMENT (OUTPATIENT)
Dept: GENERAL RADIOLOGY | Facility: HOSPITAL | Age: 77
End: 2022-06-27

## 2022-06-27 ENCOUNTER — HOSPITAL ENCOUNTER (INPATIENT)
Facility: HOSPITAL | Age: 77
LOS: 1 days | Discharge: HOME | End: 2022-06-29
Attending: EMERGENCY MEDICINE | Admitting: INTERNAL MEDICINE

## 2022-06-27 ENCOUNTER — APPOINTMENT (OUTPATIENT)
Dept: CT IMAGING | Facility: HOSPITAL | Age: 77
End: 2022-06-27

## 2022-06-27 ENCOUNTER — TELEPHONE (OUTPATIENT)
Dept: FAMILY MEDICINE CLINIC | Facility: CLINIC | Age: 77
End: 2022-06-27

## 2022-06-27 DIAGNOSIS — R29.90 STROKE-LIKE SYMPTOMS: Primary | ICD-10-CM

## 2022-06-27 DIAGNOSIS — R20.0 LEFT ARM NUMBNESS: ICD-10-CM

## 2022-06-27 LAB
ALT SERPL W P-5'-P-CCNC: 16 U/L (ref 1–33)
APTT PPP: 23.1 SECONDS (ref 22–39)
AST SERPL-CCNC: 27 U/L (ref 1–32)
BASOPHILS # BLD AUTO: 0.06 10*3/MM3 (ref 0–0.2)
BASOPHILS NFR BLD AUTO: 0.7 % (ref 0–1.5)
BUN BLDA-MCNC: 30 MG/DL (ref 8–26)
CA-I BLDA-SCNC: 1.3 MMOL/L (ref 1.2–1.32)
CHLORIDE BLDA-SCNC: 107 MMOL/L (ref 98–109)
CO2 BLDA-SCNC: 23 MMOL/L (ref 24–29)
CREAT BLDA-MCNC: 1.3 MG/DL (ref 0.6–1.3)
DEPRECATED RDW RBC AUTO: 49.7 FL (ref 37–54)
EGFRCR SERPLBLD CKD-EPI 2021: 43 ML/MIN/1.73
EOSINOPHIL # BLD AUTO: 0.17 10*3/MM3 (ref 0–0.4)
EOSINOPHIL NFR BLD AUTO: 2.1 % (ref 0.3–6.2)
ERYTHROCYTE [DISTWIDTH] IN BLOOD BY AUTOMATED COUNT: 13.3 % (ref 12.3–15.4)
FLUAV RNA RESP QL NAA+PROBE: NOT DETECTED
FLUBV RNA RESP QL NAA+PROBE: NOT DETECTED
GLUCOSE BLDC GLUCOMTR-MCNC: 112 MG/DL (ref 70–130)
HCT VFR BLD AUTO: 38.8 % (ref 34–46.6)
HCT VFR BLDA CALC: 23 % (ref 38–51)
HGB BLD-MCNC: 12.8 G/DL (ref 12–15.9)
HGB BLDA-MCNC: 7.8 G/DL (ref 12–17)
HOLD SPECIMEN: NORMAL
HOLD SPECIMEN: NORMAL
IMM GRANULOCYTES # BLD AUTO: 0.02 10*3/MM3 (ref 0–0.05)
IMM GRANULOCYTES NFR BLD AUTO: 0.2 % (ref 0–0.5)
INR PPP: 1.3 (ref 0.8–1.2)
LYMPHOCYTES # BLD AUTO: 2.73 10*3/MM3 (ref 0.7–3.1)
LYMPHOCYTES NFR BLD AUTO: 33.7 % (ref 19.6–45.3)
MCH RBC QN AUTO: 33.2 PG (ref 26.6–33)
MCHC RBC AUTO-ENTMCNC: 33 G/DL (ref 31.5–35.7)
MCV RBC AUTO: 100.8 FL (ref 79–97)
MONOCYTES # BLD AUTO: 0.64 10*3/MM3 (ref 0.1–0.9)
MONOCYTES NFR BLD AUTO: 7.9 % (ref 5–12)
NEUTROPHILS NFR BLD AUTO: 4.47 10*3/MM3 (ref 1.7–7)
NEUTROPHILS NFR BLD AUTO: 55.4 % (ref 42.7–76)
NRBC BLD AUTO-RTO: 0 /100 WBC (ref 0–0.2)
PLATELET # BLD AUTO: 277 10*3/MM3 (ref 140–450)
PMV BLD AUTO: 9.4 FL (ref 6–12)
POTASSIUM BLDA-SCNC: 4.9 MMOL/L (ref 3.5–4.9)
PROTHROMBIN TIME: 15.9 SECONDS (ref 12.8–15.2)
RBC # BLD AUTO: 3.85 10*6/MM3 (ref 3.77–5.28)
RSV RNA NPH QL NAA+NON-PROBE: NOT DETECTED
SARS-COV-2 RNA RESP QL NAA+PROBE: NOT DETECTED
SODIUM BLD-SCNC: 138 MMOL/L (ref 138–146)
TROPONIN T SERPL-MCNC: <0.01 NG/ML (ref 0–0.03)
WBC NRBC COR # BLD: 8.09 10*3/MM3 (ref 3.4–10.8)
WHOLE BLOOD HOLD COAG: NORMAL
WHOLE BLOOD HOLD SPECIMEN: NORMAL

## 2022-06-27 PROCEDURE — 85730 THROMBOPLASTIN TIME PARTIAL: CPT | Performed by: EMERGENCY MEDICINE

## 2022-06-27 PROCEDURE — 84450 TRANSFERASE (AST) (SGOT): CPT | Performed by: EMERGENCY MEDICINE

## 2022-06-27 PROCEDURE — 70498 CT ANGIOGRAPHY NECK: CPT

## 2022-06-27 PROCEDURE — 70450 CT HEAD/BRAIN W/O DYE: CPT

## 2022-06-27 PROCEDURE — G0378 HOSPITAL OBSERVATION PER HR: HCPCS

## 2022-06-27 PROCEDURE — 84460 ALANINE AMINO (ALT) (SGPT): CPT | Performed by: EMERGENCY MEDICINE

## 2022-06-27 PROCEDURE — 99223 1ST HOSP IP/OBS HIGH 75: CPT | Performed by: INTERNAL MEDICINE

## 2022-06-27 PROCEDURE — 85610 PROTHROMBIN TIME: CPT

## 2022-06-27 PROCEDURE — 0 IOPAMIDOL PER 1 ML: Performed by: EMERGENCY MEDICINE

## 2022-06-27 PROCEDURE — 87637 SARSCOV2&INF A&B&RSV AMP PRB: CPT | Performed by: INTERNAL MEDICINE

## 2022-06-27 PROCEDURE — 82077 ASSAY SPEC XCP UR&BREATH IA: CPT | Performed by: PHYSICIAN ASSISTANT

## 2022-06-27 PROCEDURE — 71045 X-RAY EXAM CHEST 1 VIEW: CPT

## 2022-06-27 PROCEDURE — 85014 HEMATOCRIT: CPT

## 2022-06-27 PROCEDURE — 84443 ASSAY THYROID STIM HORMONE: CPT | Performed by: PHYSICIAN ASSISTANT

## 2022-06-27 PROCEDURE — 4A03X5D MEASUREMENT OF ARTERIAL FLOW, INTRACRANIAL, EXTERNAL APPROACH: ICD-10-PCS | Performed by: RADIOLOGY

## 2022-06-27 PROCEDURE — 85025 COMPLETE CBC W/AUTO DIFF WBC: CPT | Performed by: EMERGENCY MEDICINE

## 2022-06-27 PROCEDURE — 80047 BASIC METABLC PNL IONIZED CA: CPT

## 2022-06-27 PROCEDURE — 0042T HC CT CEREBRAL PERFUSION W/WO CONTRAST: CPT

## 2022-06-27 PROCEDURE — 99223 1ST HOSP IP/OBS HIGH 75: CPT | Performed by: NURSE PRACTITIONER

## 2022-06-27 PROCEDURE — 99284 EMERGENCY DEPT VISIT MOD MDM: CPT

## 2022-06-27 PROCEDURE — 84484 ASSAY OF TROPONIN QUANT: CPT | Performed by: EMERGENCY MEDICINE

## 2022-06-27 PROCEDURE — 70496 CT ANGIOGRAPHY HEAD: CPT

## 2022-06-27 PROCEDURE — 93005 ELECTROCARDIOGRAM TRACING: CPT | Performed by: EMERGENCY MEDICINE

## 2022-06-27 RX ORDER — ATORVASTATIN CALCIUM 40 MG/1
80 TABLET, FILM COATED ORAL NIGHTLY
Status: DISCONTINUED | OUTPATIENT
Start: 2022-06-27 | End: 2022-06-29 | Stop reason: HOSPADM

## 2022-06-27 RX ORDER — ASPIRIN 300 MG/1
300 SUPPOSITORY RECTAL DAILY
Status: DISCONTINUED | OUTPATIENT
Start: 2022-06-28 | End: 2022-06-29 | Stop reason: HOSPADM

## 2022-06-27 RX ORDER — CLOPIDOGREL BISULFATE 75 MG/1
75 TABLET ORAL DAILY
Status: DISCONTINUED | OUTPATIENT
Start: 2022-06-28 | End: 2022-06-29 | Stop reason: HOSPADM

## 2022-06-27 RX ORDER — SODIUM CHLORIDE 0.9 % (FLUSH) 0.9 %
10 SYRINGE (ML) INJECTION AS NEEDED
Status: DISCONTINUED | OUTPATIENT
Start: 2022-06-27 | End: 2022-06-29 | Stop reason: HOSPADM

## 2022-06-27 RX ORDER — ATORVASTATIN CALCIUM 40 MG/1
80 TABLET, FILM COATED ORAL NIGHTLY
Status: DISCONTINUED | OUTPATIENT
Start: 2022-06-28 | End: 2022-06-27 | Stop reason: SDUPTHER

## 2022-06-27 RX ORDER — ASPIRIN 81 MG/1
81 TABLET, CHEWABLE ORAL DAILY
Status: DISCONTINUED | OUTPATIENT
Start: 2022-06-28 | End: 2022-06-29 | Stop reason: HOSPADM

## 2022-06-27 RX ORDER — CLOPIDOGREL BISULFATE 75 MG/1
300 TABLET ORAL ONCE
Status: COMPLETED | OUTPATIENT
Start: 2022-06-27 | End: 2022-06-27

## 2022-06-27 RX ADMIN — IOPAMIDOL 115 ML: 755 INJECTION, SOLUTION INTRAVENOUS at 21:12

## 2022-06-27 RX ADMIN — ATORVASTATIN CALCIUM 80 MG: 40 TABLET, FILM COATED ORAL at 22:06

## 2022-06-27 RX ADMIN — CLOPIDOGREL BISULFATE 300 MG: 75 TABLET ORAL at 22:06

## 2022-06-27 NOTE — TELEPHONE ENCOUNTER
Pt called in; said that she took a dose of Hydroxyzine pamoate 25mg this morning @ 10 AM. Has been shaky, weak, and feeling woozy since she took it. Pt said she was going to discontinue this medication since she doesn't like how it affected her. Wanted to let staff know and also wanted to know if they wanted to recommend another medication or if she was good to continue on with meds she already has. She would like a callback to let her know. [phone]

## 2022-06-27 NOTE — TELEPHONE ENCOUNTER
She has Clonazepam to use sparingly. If she did not like the Vistaril she does not have to take it.

## 2022-06-28 ENCOUNTER — APPOINTMENT (OUTPATIENT)
Dept: CARDIOLOGY | Facility: HOSPITAL | Age: 77
End: 2022-06-28

## 2022-06-28 ENCOUNTER — APPOINTMENT (OUTPATIENT)
Dept: MRI IMAGING | Facility: HOSPITAL | Age: 77
End: 2022-06-28

## 2022-06-28 PROBLEM — R29.90 STROKE-LIKE SYMPTOMS: Status: ACTIVE | Noted: 2022-06-28

## 2022-06-28 LAB
ETHANOL BLD-MCNC: <10 MG/DL (ref 0–10)
GLUCOSE BLDC GLUCOMTR-MCNC: 105 MG/DL (ref 70–130)
GLUCOSE BLDC GLUCOMTR-MCNC: 91 MG/DL (ref 70–130)
HBA1C MFR BLD: 5.4 % (ref 4.8–5.6)
HCT VFR BLD AUTO: 35.2 % (ref 34–46.6)
HGB BLD-MCNC: 11.3 G/DL (ref 12–15.9)
HOLD SPECIMEN: NORMAL
QT INTERVAL: 414 MS
QTC INTERVAL: 443 MS
TSH SERPL DL<=0.05 MIU/L-ACNC: 2.59 UIU/ML (ref 0.27–4.2)

## 2022-06-28 PROCEDURE — 83036 HEMOGLOBIN GLYCOSYLATED A1C: CPT | Performed by: NURSE PRACTITIONER

## 2022-06-28 PROCEDURE — 99232 SBSQ HOSP IP/OBS MODERATE 35: CPT | Performed by: INTERNAL MEDICINE

## 2022-06-28 PROCEDURE — 70551 MRI BRAIN STEM W/O DYE: CPT

## 2022-06-28 PROCEDURE — 92523 SPEECH SOUND LANG COMPREHEN: CPT | Performed by: SPEECH-LANGUAGE PATHOLOGIST

## 2022-06-28 PROCEDURE — 82962 GLUCOSE BLOOD TEST: CPT

## 2022-06-28 PROCEDURE — 97165 OT EVAL LOW COMPLEX 30 MIN: CPT

## 2022-06-28 PROCEDURE — 93306 TTE W/DOPPLER COMPLETE: CPT

## 2022-06-28 PROCEDURE — 85014 HEMATOCRIT: CPT | Performed by: PHYSICIAN ASSISTANT

## 2022-06-28 PROCEDURE — 99233 SBSQ HOSP IP/OBS HIGH 50: CPT | Performed by: STUDENT IN AN ORGANIZED HEALTH CARE EDUCATION/TRAINING PROGRAM

## 2022-06-28 PROCEDURE — 85018 HEMOGLOBIN: CPT | Performed by: PHYSICIAN ASSISTANT

## 2022-06-28 RX ORDER — FLUOXETINE HYDROCHLORIDE 20 MG/1
40 CAPSULE ORAL DAILY
Status: DISCONTINUED | OUTPATIENT
Start: 2022-06-28 | End: 2022-06-29 | Stop reason: HOSPADM

## 2022-06-28 RX ORDER — FAMOTIDINE 20 MG/1
20 TABLET, FILM COATED ORAL NIGHTLY
Status: DISCONTINUED | OUTPATIENT
Start: 2022-06-28 | End: 2022-06-29 | Stop reason: HOSPADM

## 2022-06-28 RX ORDER — ACETAMINOPHEN 325 MG/1
650 TABLET ORAL EVERY 6 HOURS PRN
Status: DISCONTINUED | OUTPATIENT
Start: 2022-06-28 | End: 2022-06-29 | Stop reason: HOSPADM

## 2022-06-28 RX ORDER — SODIUM CHLORIDE 9 MG/ML
100 INJECTION, SOLUTION INTRAVENOUS CONTINUOUS
Status: DISCONTINUED | OUTPATIENT
Start: 2022-06-28 | End: 2022-06-29 | Stop reason: HOSPADM

## 2022-06-28 RX ADMIN — ACETAMINOPHEN 650 MG: 325 TABLET ORAL at 18:28

## 2022-06-28 RX ADMIN — CLOPIDOGREL BISULFATE 75 MG: 75 TABLET ORAL at 08:19

## 2022-06-28 RX ADMIN — ASPIRIN 81 MG CHEWABLE TABLET 81 MG: 81 TABLET CHEWABLE at 08:19

## 2022-06-28 RX ADMIN — SODIUM CHLORIDE 100 ML/HR: 9 INJECTION, SOLUTION INTRAVENOUS at 08:19

## 2022-06-28 RX ADMIN — FLUOXETINE 40 MG: 20 CAPSULE ORAL at 08:19

## 2022-06-28 RX ADMIN — ATORVASTATIN CALCIUM 80 MG: 40 TABLET, FILM COATED ORAL at 21:36

## 2022-06-28 RX ADMIN — ACETAMINOPHEN 650 MG: 325 TABLET ORAL at 09:47

## 2022-06-28 RX ADMIN — FAMOTIDINE 20 MG: 20 TABLET ORAL at 21:36

## 2022-06-29 ENCOUNTER — TELEPHONE (OUTPATIENT)
Dept: NEUROLOGY | Facility: CLINIC | Age: 77
End: 2022-06-29

## 2022-06-29 ENCOUNTER — READMISSION MANAGEMENT (OUTPATIENT)
Dept: CALL CENTER | Facility: HOSPITAL | Age: 77
End: 2022-06-29

## 2022-06-29 VITALS
TEMPERATURE: 98.1 F | WEIGHT: 154.98 LBS | OXYGEN SATURATION: 94 % | BODY MASS INDEX: 27.46 KG/M2 | RESPIRATION RATE: 16 BRPM | HEART RATE: 65 BPM | HEIGHT: 63 IN | DIASTOLIC BLOOD PRESSURE: 79 MMHG | SYSTOLIC BLOOD PRESSURE: 167 MMHG

## 2022-06-29 DIAGNOSIS — I48.0 PAF (PAROXYSMAL ATRIAL FIBRILLATION): ICD-10-CM

## 2022-06-29 DIAGNOSIS — R29.90 STROKE-LIKE SYMPTOMS: Primary | ICD-10-CM

## 2022-06-29 DIAGNOSIS — R20.0 LEFT ARM NUMBNESS: ICD-10-CM

## 2022-06-29 LAB
BH CV ECHO MEAS - AO MAX PG: 8.4 MMHG
BH CV ECHO MEAS - AO MEAN PG: 5.2 MMHG
BH CV ECHO MEAS - AO ROOT DIAM: 2.8 CM
BH CV ECHO MEAS - AO V2 MAX: 145.2 CM/SEC
BH CV ECHO MEAS - AO V2 VTI: 36.5 CM
BH CV ECHO MEAS - AVA(I,D): 1.59 CM2
BH CV ECHO MEAS - EDV(CUBED): 89.4 ML
BH CV ECHO MEAS - EDV(MOD-SP2): 87.9 ML
BH CV ECHO MEAS - EDV(MOD-SP4): 95.4 ML
BH CV ECHO MEAS - EF(MOD-BP): 62.8 %
BH CV ECHO MEAS - EF(MOD-SP2): 67.3 %
BH CV ECHO MEAS - EF(MOD-SP4): 56.9 %
BH CV ECHO MEAS - ESV(CUBED): 22.7 ML
BH CV ECHO MEAS - ESV(MOD-SP2): 28.7 ML
BH CV ECHO MEAS - ESV(MOD-SP4): 41.1 ML
BH CV ECHO MEAS - FS: 36.7 %
BH CV ECHO MEAS - IVS/LVPW: 0.95 CM
BH CV ECHO MEAS - IVSD: 0.86 CM
BH CV ECHO MEAS - LA DIMENSION: 3.3 CM
BH CV ECHO MEAS - LAT PEAK E' VEL: 6.7 CM/SEC
BH CV ECHO MEAS - LV MASS(C)D: 127.5 GRAMS
BH CV ECHO MEAS - LV MAX PG: 2.9 MMHG
BH CV ECHO MEAS - LV MEAN PG: 1.78 MMHG
BH CV ECHO MEAS - LV V1 MAX: 85.7 CM/SEC
BH CV ECHO MEAS - LV V1 VTI: 22 CM
BH CV ECHO MEAS - LVIDD: 4.5 CM
BH CV ECHO MEAS - LVIDS: 2.8 CM
BH CV ECHO MEAS - LVOT AREA: 2.6 CM2
BH CV ECHO MEAS - LVOT DIAM: 1.83 CM
BH CV ECHO MEAS - LVPWD: 0.9 CM
BH CV ECHO MEAS - MED PEAK E' VEL: 6.9 CM/SEC
BH CV ECHO MEAS - MV MAX PG: 4.6 MMHG
BH CV ECHO MEAS - MV MEAN PG: 1.72 MMHG
BH CV ECHO MEAS - MV V2 VTI: 34.5 CM
BH CV ECHO MEAS - MVA(VTI): 1.68 CM2
BH CV ECHO MEAS - PA ACC TIME: 0.15 SEC
BH CV ECHO MEAS - PA PR(ACCEL): 12.5 MMHG
BH CV ECHO MEAS - PA V2 MAX: 80.7 CM/SEC
BH CV ECHO MEAS - SV(LVOT): 58.2 ML
BH CV ECHO MEAS - SV(MOD-SP2): 59.2 ML
BH CV ECHO MEAS - SV(MOD-SP4): 54.3 ML
BH CV ECHO MEAS - TAPSE (>1.6): 1.8 CM
BH CV XLRA - RV BASE: 2.6 CM
BH CV XLRA - RV LENGTH: 6.2 CM
BH CV XLRA - RV MID: 2.04 CM
BH CV XLRA - TDI S': 12.3 CM/SEC
LEFT ATRIUM VOLUME INDEX: 25.4 ML/M2
LV EF 2D ECHO EST: 60 %
MAXIMAL PREDICTED HEART RATE: 145 BPM
STRESS TARGET HR: 123 BPM

## 2022-06-29 PROCEDURE — 97161 PT EVAL LOW COMPLEX 20 MIN: CPT

## 2022-06-29 PROCEDURE — 97116 GAIT TRAINING THERAPY: CPT

## 2022-06-29 PROCEDURE — 99233 SBSQ HOSP IP/OBS HIGH 50: CPT | Performed by: STUDENT IN AN ORGANIZED HEALTH CARE EDUCATION/TRAINING PROGRAM

## 2022-06-29 RX ORDER — CLOPIDOGREL BISULFATE 75 MG/1
75 TABLET ORAL DAILY
Qty: 30 TABLET | Refills: 0 | Status: SHIPPED | OUTPATIENT
Start: 2022-06-30 | End: 2022-07-22 | Stop reason: SDUPTHER

## 2022-06-29 RX ORDER — ATORVASTATIN CALCIUM 80 MG/1
80 TABLET, FILM COATED ORAL NIGHTLY
Qty: 90 TABLET | Refills: 0 | Status: SHIPPED | OUTPATIENT
Start: 2022-06-29 | End: 2022-08-29 | Stop reason: SDUPTHER

## 2022-06-29 RX ADMIN — FLUOXETINE 40 MG: 20 CAPSULE ORAL at 08:10

## 2022-06-29 RX ADMIN — CLOPIDOGREL BISULFATE 75 MG: 75 TABLET ORAL at 08:10

## 2022-06-29 RX ADMIN — ASPIRIN 81 MG CHEWABLE TABLET 81 MG: 81 TABLET CHEWABLE at 08:10

## 2022-06-29 RX ADMIN — Medication 10 ML: at 08:12

## 2022-06-29 RX ADMIN — ACETAMINOPHEN 650 MG: 325 TABLET ORAL at 11:07

## 2022-06-29 NOTE — TELEPHONE ENCOUNTER
Patient advised. She has stopped taking the Vistaril and feels better. Patient states the symptoms may have been due to her having a stroke and not the actual medication but she will not be taking it again.

## 2022-06-29 NOTE — OUTREACH NOTE
Prep Survey    Flowsheet Row Responses   [facility] patient discharged from? [place]   Is LACE score < 7 ? Yes   Emergency Room discharge w/ pulse ox? No   Eligibility [facility]   Date of Admission 06/27/22   Date of Discharge 06/29/22   Discharge Disposition Home or Self Care   Discharge diagnosis Stroke-like symptoms    Does the patient have one of the following disease processes/diagnoses(primary or secondary)? Other   Does the patient have Home health ordered? No   Is there a DME ordered? No   Prep survey completed? Yes          LENNY BLACKMAN - Registered Nurse

## 2022-06-29 NOTE — TELEPHONE ENCOUNTER
Caller: FRANK    Relationship to patient: N/A    Best call back number: [phone]    New or established patient?  [] New  [x] Established    Date of discharge: 06/29/2022  Facility discharged from:     Diagnosis/Symptoms: CVA  Length of stay (If applicable): N/A    Specialty Only: Did you see a [facility] health provider?    [x] Yes  [] No  If so, who?IKER    FOLLOW UP IN 1 MONTH, 1ST AVAILABLE IS AUGUST 25TH, PATIENT IS ON THE WAITING LIST

## 2022-06-30 ENCOUNTER — TRANSITIONAL CARE MANAGEMENT TELEPHONE ENCOUNTER (OUTPATIENT)
Dept: CALL CENTER | Facility: HOSPITAL | Age: 77
End: 2022-06-30

## 2022-06-30 NOTE — OUTREACH NOTE
Call Center TCM Note    Flowsheet Row Responses   [facility] patient discharged from? [place]   Does the patient have one of the following disease processes/diagnoses(primary or secondary)? Other   TCM attempt successful? Yes   Call start time 1120   Call end time 1120   Discharge diagnosis Stroke-like symptoms    Does the patient have all medications ordered at discharge? Yes   Is the patient taking all medications as directed (includes completed medication regime)? Yes   Does the patient have a primary care provider?  Yes   Does the patient have an appointment with their PCP within 7 days of discharge? Yes   Comments regarding PCP hospital f/u with PCP on 7/12   Has the patient kept scheduled appointments due by today? N/A   Has home health visited the patient within 72 hours of discharge? N/A   Psychosocial issues? No   Did the patient receive a copy of their discharge instructions? Yes   What is the patient's perception of their health status since discharge? Improving   Is the patient/caregiver able to teach back the hierarchy of who to call/visit for symptoms/problems? PCP, Specialist, Home health nurse, Urgent Care, ED, 911 Yes   TCM call completed? Yes   Wrap up additional comments Doing well, no questions, confirmed appt with PCP for 7/12.          Pearl Gee RN    6/30/2022, 11:21 EDT

## 2022-07-12 ENCOUNTER — TELEMEDICINE (OUTPATIENT)
Dept: FAMILY MEDICINE CLINIC | Facility: CLINIC | Age: 77
End: 2022-07-12

## 2022-07-12 DIAGNOSIS — R29.90 STROKE-LIKE SYMPTOMS: ICD-10-CM

## 2022-07-12 DIAGNOSIS — R20.0 LEFT ARM NUMBNESS: Primary | ICD-10-CM

## 2022-07-12 DIAGNOSIS — I10 HYPERTENSION, ESSENTIAL: ICD-10-CM

## 2022-07-12 PROCEDURE — 1111F DSCHRG MED/CURRENT MED MERGE: CPT | Performed by: PHYSICIAN ASSISTANT

## 2022-07-12 PROCEDURE — 99213 OFFICE O/P EST LOW 20 MIN: CPT | Performed by: PHYSICIAN ASSISTANT

## 2022-07-12 NOTE — PROGRESS NOTES
"Chief Complaint  LEFT ARM NUMBNESS (Patient had 11 \"mini Strokes' ,BH LOS ADM 6/27 DC 6/29 )    Subjective          Alysia Aguayo presents to [facility]  Patient in today for hospital follow-up through mychart appointment with patient permission. Patient understands limitation of mychart video.  She was admitted on 6/27/2022 and discharged on 6/29/2022.  She states was diagnosed with stroke like symptoms, TIAs. She was contacted by our office to encourage f/up on 6/30/2022 . She has been started on atorvastatin 80 mg and  Plavix and has f/up with neurology next month. She states has been feeling well. Previous left sided facial droop and left arm numbness went away prior to discharge from the hospital and has not returned. She denies any headaches , dizziness, chest pain or shortness of breath. She has been monitoring her bp and states it has been normal. She is getting ready to apply holter monitor for 2 weeks for further evaluation.  She did get back from vacation a few days ago- was diagnosed with covid 4 days ago.  States is feeling pretty well from that, just tired. Denies any shortness of breath or fever.  Had covid vaccines and booster.       Objective   Vital Signs:   There were no vitals taken for this visit.    There is no height or weight on file to calculate BMI.    Review of Systems   Constitutional: Positive for fatigue. Negative for fever.   HENT: Negative for congestion and sore throat.    Respiratory: Negative for shortness of breath.    Gastrointestinal: Negative for abdominal pain, diarrhea, nausea and vomiting.   Genitourinary: Negative for dysuria and frequency.   Neurological: Negative for dizziness, speech difficulty, light-headedness, numbness, headache and memory problem.       Past History:  Medical History: has a past medical history of Arthritis, Back pain, Carotid artery disease (HCC), Coronary artery disease, Depression, GERD (gastroesophageal reflux " disease), History of alcoholism (HCC), Hyperlipidemia, Hypertension, Kidney disease, Neck pain, Spastic colon, Spinal stenosis, Wears dentures, and Wears reading eyeglasses.   Surgical History: has a past surgical history that includes Hysterectomy; Cholecystectomy; Tubal ligation; Lumbar discectomy (07/10/2014); Cataract extraction (Bilateral); Colonoscopy (2016); Esophageal dilation; Coronary stent placement (2010); Cardiac catheterization (2010); and lumbar laminectomy discectomy decompression (N/A, 10/17/2019).   Family History: family history includes Cancer in her father; Coronary artery disease in her mother; Emphysema in her mother; Heart disease in her mother; Lung cancer in her father.   Social History: reports that she quit smoking about 22 years ago. Her smoking use included cigarettes. She has a 60.00 pack-year smoking history. She has never used smokeless tobacco. She reports that she does not drink alcohol and does not use drugs.      Current Outpatient Medications:   •  acetaminophen (TYLENOL) 325 MG tablet, Take 650 mg by mouth Every 4 (Four) Hours As Needed for Mild Pain  or Moderate Pain ., Disp: , Rfl:   •  amLODIPine (NORVASC) 5 MG tablet, Take 2.5 mg by mouth 2 (Two) Times a Day., Disp: , Rfl:   •  aspirin 81 MG chewable tablet, Chew 81 mg Daily., Disp: , Rfl:   •  atorvastatin (LIPITOR) 80 MG tablet, Take 1 tablet by mouth Every Night., Disp: 90 tablet, Rfl: 0  •  clopidogrel (PLAVIX) 75 MG tablet, Take 1 tablet by mouth Daily., Disp: 30 tablet, Rfl: 0  •  Diclofenac Sodium (VOLTAREN) 1 % gel gel, Apply 4 g topically to the appropriate area as directed 4 (Four) Times a Day As Needed (pain)., Disp: 4 g, Rfl: 5  •  Esomeprazole Magnesium (NEXIUM PO), Take 75 mg by mouth Before Breakfast., Disp: , Rfl:   •  FLUoxetine (PROzac) 40 MG capsule, Take 1 capsule by mouth Daily., Disp: 90 capsule, Rfl: 1  •  glycopyrrolate (ROBINUL) 1 MG tablet, Take 1 mg by mouth 2 (Two) Times a Day., Disp: , Rfl:    Allergies: Penicillins, Codeine, and Imodium a-d [loperamide hcl]    Physical Exam  Constitutional:       Appearance: Normal appearance.   Pulmonary:      Effort: Pulmonary effort is normal.   Neurological:      Mental Status: She is alert.   Psychiatric:         Mood and Affect: Mood normal.         Behavior: Behavior normal.             Assessment and Plan   Diagnoses and all orders for this visit:    1. Left arm numbness (Primary)  Previous symptoms have resolved and have not returned. Patient encouraged to monitor her symptoms closely and return to ER if any appear. Keep f/up with neurology as directed.   2. Stroke-like symptoms    3. Hypertension, essential  Continue current medication- continue to monitor and let us know if elevated.   4. covid 19    Patient to closely monitor symptoms ; to ER for any shortness of breath or progression of symptoms if needed.       Follow Up   No follow-ups on file.  Patient was given instructions and counseling regarding her condition or for health maintenance advice. Please see specific information pulled into the AVS if appropriate.     Yesica Zhu PA-C

## 2022-07-22 RX ORDER — CLOPIDOGREL BISULFATE 75 MG/1
75 TABLET ORAL DAILY
Qty: 30 TABLET | Refills: 1 | Status: SHIPPED | OUTPATIENT
Start: 2022-07-22

## 2022-08-08 ENCOUNTER — OFFICE VISIT (OUTPATIENT)
Dept: GASTROENTEROLOGY | Facility: CLINIC | Age: 77
End: 2022-08-08

## 2022-08-08 VITALS
WEIGHT: 153.4 LBS | DIASTOLIC BLOOD PRESSURE: 68 MMHG | HEART RATE: 67 BPM | TEMPERATURE: 97.6 F | BODY MASS INDEX: 27.18 KG/M2 | SYSTOLIC BLOOD PRESSURE: 137 MMHG

## 2022-08-08 DIAGNOSIS — Z98.890 HISTORY OF COLONOSCOPY: ICD-10-CM

## 2022-08-08 DIAGNOSIS — K58.2 IRRITABLE BOWEL SYNDROME WITH BOTH CONSTIPATION AND DIARRHEA: Primary | ICD-10-CM

## 2022-08-08 DIAGNOSIS — R15.9 INCONTINENCE OF FECES, UNSPECIFIED FECAL INCONTINENCE TYPE: ICD-10-CM

## 2022-08-08 DIAGNOSIS — R15.2 FECAL URGENCY: ICD-10-CM

## 2022-08-08 PROCEDURE — 99214 OFFICE O/P EST MOD 30 MIN: CPT | Performed by: NURSE PRACTITIONER

## 2022-08-08 NOTE — PROGRESS NOTES
GASTROENTEROLOGY OFFICE NOTE    Alysia Aguayo  [id number]  1945    CARE TEAM  Patient Care Team:  Yseica Zhu PA-C as PCP - General  Dionicio Pollard MD as Consulting Physician (Neurosurgery)  Heriberto Lopes MD as Consulting Physician (Pain Medicine)  Varinder Alan MD as Consulting Physician (Cardiology)    Referring Provider: Yesica Zhu PA-C    Chief Complaint   Patient presents with   • Diarrhea   • Abdominal Pain     bloating        HISTORY OF PRESENT ILLNESS:   Alysia Aguayo is a 76 y.o. female who presents to the clinic today as a referral from Ms. Marko JOHNSON for evaluation regarding irritable bowel syndrome with diarrhea.  She is accompanied by her daughter today who has history of ulcerative colitis.  She was previously established with Dr. Cano and treated for irritable bowel syndrome which she reports she was diagnosed at the age of 18.  She had prior EGD and colonoscopy by Dr. Cano at [facility].  She reports prior diagnoses of spastic colon and decreased rectal tone.  She has previously been prescribed Robinul to take twice daily as needed for abdominal discomfort which has been helpful for abdominal discomfort but has not been helpful for diarrhea.  She reports at the time of the birth of her first child she had a tear with subsequent incontinence of stool.  She continues to have intermittent episodes of incontinence of stool.  She underwent cholecystectomy in the 1980s.  She has a bowel movement daily to every 3 days.  She intermittently has up to 5-6 bowel movements per day described as diarrhea.  She has intermittent bloating.   Past Medical History:   Diagnosis Date   • Arthritis    • Back pain    • Carotid artery disease (HCC)    • Coronary artery disease    • Depression    • GERD (gastroesophageal reflux disease)    • History of alcoholism ([facility])    • Hyperlipidemia    • Hypertension    • Kidney disease     was stage 4 but taking  excessive amounts of ibuprofen, f/u with nephrologist at the time, resolved since and was to see nephrologist as needed    • Neck pain    • Spastic colon    • Spinal stenosis    • Stroke (HCC) 2022   • Wears dentures    • Wears reading eyeglasses         Past Surgical History:   Procedure Laterality Date   • CARDIAC CATHETERIZATION  2010    x2    • CATARACT EXTRACTION Bilateral    • CHOLECYSTECTOMY     • COLONOSCOPY  2016   • CORONARY STENT PLACEMENT  2010    x3    • ESOPHAGEAL DILATATION     • HYSTERECTOMY     • LUMBAR DISCECTOMY  07/10/2014    Rt- L1/2 Dr. Dionicio Pollard    • LUMBAR LAMINECTOMY DISCECTOMY DECOMPRESSION N/A 10/17/2019    Procedure: LUMBAR LAMINECTOMY L3-5;  Surgeon: Dionicio Pollard MD;  Location: [facility];  Service: Neurosurgery   • TUBAL ABDOMINAL LIGATION     • UPPER GASTROINTESTINAL ENDOSCOPY          Current Outpatient Medications on File Prior to Visit   Medication Sig   • acetaminophen (TYLENOL) 325 MG tablet Take 650 mg by mouth Every 4 (Four) Hours As Needed for Mild Pain  or Moderate Pain .   • amLODIPine (NORVASC) 5 MG tablet Take 2.5 mg by mouth 2 (Two) Times a Day.   • aspirin 81 MG chewable tablet Chew 81 mg Daily.   • atorvastatin (LIPITOR) 80 MG tablet Take 1 tablet by mouth Every Night.   • clopidogrel (PLAVIX) 75 MG tablet Take 1 tablet by mouth Daily.   • Diclofenac Sodium (VOLTAREN) 1 % gel gel Apply 4 g topically to the appropriate area as directed 4 (Four) Times a Day As Needed (pain).   • Esomeprazole Magnesium (NEXIUM PO) Take 75 mg by mouth Before Breakfast.   • FLUoxetine (PROzac) 40 MG capsule Take 1 capsule by mouth Daily.   • glycopyrrolate (ROBINUL) 1 MG tablet Take 1 mg by mouth 2 (Two) Times a Day.     No current facility-administered medications on file prior to visit.       Allergies   Allergen Reactions   • Penicillins Hives   • Codeine Nausea And Vomiting   • Imodium A-D [Loperamide Hcl] Nausea And Vomiting       Family History   Problem Relation Age of Onset    • Heart disease Mother    • Emphysema Mother    • Coronary artery disease Mother    • Cancer Father    • Lung cancer Father    • Colon cancer Neg Hx        Social History     Socioeconomic History   • Marital status:    Tobacco Use   • Smoking status: Former Smoker     Packs/day: 1.50     Years: 40.00     Pack years: 60.00     Types: Cigarettes     Quit date:      Years since quittin.6   • Smokeless tobacco: Never Used   Vaping Use   • Vaping Use: Never used   Substance and Sexual Activity   • Alcohol use: No     Comment: hasn't used since , hx of alcoholism   • Drug use: No   • Sexual activity: Defer       PHYSICAL EXAM   /68 (BP Location: Left arm, Patient Position: Sitting, Cuff Size: Adult)   Pulse 67   Temp 97.6 °F (36.4 °C) (Temporal)   Wt 69.6 kg (153 lb 6.4 oz)   BMI 27.18 kg/m²   Physical Exam  Constitutional:       General: She is not in acute distress.     Appearance: She is not toxic-appearing.   HENT:      Head: Normocephalic and atraumatic. No contusion.      Right Ear: External ear normal.      Left Ear: External ear normal.   Eyes:      General: Lids are normal. No scleral icterus.        Right eye: No discharge.         Left eye: No discharge.      Extraocular Movements: Extraocular movements intact.   Neck:      Trachea: Trachea normal.      Comments: No visible mass  No visible adenopathy  Cardiovascular:      Rate and Rhythm: Normal rate.   Pulmonary:      Effort: No respiratory distress.      Comments: Symmetrical expansion    Abdominal:      Palpations: Abdomen is soft. There is no mass.      Tenderness: There is no abdominal tenderness.   Musculoskeletal:      Right lower leg: No edema.      Left lower leg: No edema.      Comments: Symmetrical movement of upper extremities  Symmetrical movement of lower extremities  No visible deformities   Skin:     General: Skin is warm and dry.      Coloration: Skin is not jaundiced.   Neurological:      General: No focal  deficit present.      Mental Status: She is alert and oriented to person, place, and time.   Psychiatric:         Mood and Affect: Mood normal.         Behavior: Behavior normal.         Thought Content: Thought content normal.       CMP    CMP 5/31/22 6/27/22 6/27/22 6/27/22     2108 2108 2111   Glucose 106 (A)      BUN 25      Creatinine 1.21 (A)   1.30   Sodium 139      Potassium 4.7      Chloride 106      Calcium 9.0      Total Protein 6.6      Albumin 4.2      Globulin 2.4      Total Bilirubin 0.3      Alkaline Phosphatase 104      AST (SGOT) 18 27     ALT (SGPT) 9  16    (A) Abnormal value       Comments are available for some flowsheets but are not being displayed.           CBC    CBC 5/31/22 6/28/22        WBC 6.4    RBC 3.90    Hemoglobin 12.4 11.3 (A)   Hematocrit 37.9 35.2   MCV 97    MCH 31.8    MCHC 32.7    RDW 13.5    Platelets 272    (A) Abnormal value       Comments are available for some flowsheets but are not being displayed.             ASSESSMENT / PLAN  1. Irritable bowel syndrome with both constipation and diarrhea  - recommend she stop Robinul  -I am concerned she has diarrhea, urgency and fecal incontinence from overflow for which I recommend she try to consume 25 g of fiber per day (printed handout provided), take Metamucil mixed with MiraLAX in 8 ounces of any liquid daily (separate Metamucil from other medications by 2 hours on both sides)  -If she feels as though MiraLAX and Metamucil are making fecal incontinence and/or diarrhea worse, recommend she consider half dose of MiraLAX or discontinue MiraLAX and continue with high-fiber diet and fiber supplement  -If she feels like symptoms remain uncontrolled on high-fiber diet with or without MiraLAX recommend she notify the office and I will likely consider trial of colestipol for possible bile acid diarrhea as she had prior cholecystectomy    2. Incontinence of feces, unspecified fecal incontinence type  3. Fecal urgency  - I am concerned  about overflow incontinence see treatment above  -Offered referral to physical therapy for pelvic floor therapy but she declined at this time, can consider in the future  -Awaiting colonoscopy report from Dr. Cano  - try to avoid greasy, fatty, fried foods  - consider trial of colestipol in the future  -We discussed repeat colonoscopy due to symptoms for diagnostic purposes, however, she recently restarted Plavix and would like to wait until she has follow-up with another provider to determine if she can stop Plavix prior to colonoscopy.  She and her daughter expressed they would also like for me to review prior records before proceeding with colonoscopy    4. History of colonoscopy  - awaiting colonoscopy report from [facility] to determine when repeat colonoscopy is due      Return in about 4 weeks (around 9/5/2022).    Dorita Moreno, HUE  08/08/2022

## 2022-08-11 ENCOUNTER — TELEPHONE (OUTPATIENT)
Dept: NEUROLOGY | Facility: CLINIC | Age: 77
End: 2022-08-11

## 2022-08-11 NOTE — TELEPHONE ENCOUNTER
Holter monitor report reviewed; recorded for 13 days, 9 hours.  No evidence of paroxysmal atrial fibrillation.    Margaret Dela Cruz MSN, APRN, AGACNP-BC  Stroke Neurology

## 2022-08-25 ENCOUNTER — OFFICE VISIT (OUTPATIENT)
Dept: NEUROLOGY | Facility: CLINIC | Age: 77
End: 2022-08-25

## 2022-08-25 ENCOUNTER — HOME HEALTH ADMISSION (OUTPATIENT)
Dept: HOME HEALTH SERVICES | Facility: HOME HEALTHCARE | Age: 77
End: 2022-08-25

## 2022-08-25 VITALS
DIASTOLIC BLOOD PRESSURE: 84 MMHG | TEMPERATURE: 98 F | BODY MASS INDEX: 27.82 KG/M2 | WEIGHT: 157 LBS | HEART RATE: 103 BPM | OXYGEN SATURATION: 91 % | SYSTOLIC BLOOD PRESSURE: 152 MMHG

## 2022-08-25 DIAGNOSIS — Z86.73 HISTORY OF STROKE: Primary | ICD-10-CM

## 2022-08-25 DIAGNOSIS — R42 DIZZINESS: ICD-10-CM

## 2022-08-25 DIAGNOSIS — I10 ESSENTIAL HYPERTENSION: ICD-10-CM

## 2022-08-25 DIAGNOSIS — E78.2 MIXED HYPERLIPIDEMIA: ICD-10-CM

## 2022-08-25 PROCEDURE — 99214 OFFICE O/P EST MOD 30 MIN: CPT

## 2022-08-25 RX ORDER — MECLIZINE HYDROCHLORIDE 25 MG/1
25 TABLET ORAL 3 TIMES DAILY PRN
Qty: 90 TABLET | Refills: 1 | Status: SHIPPED | OUTPATIENT
Start: 2022-08-25 | End: 2022-12-07

## 2022-08-25 NOTE — PROGRESS NOTES
New Patient Office Visit      Encounter Date: 2022   Patient Name: Alysia Aguayo  : 1945   MRN: [id number]   PCP: Yesica Zhu PA-C    Referring Provider: No ref. provider found     Chief Complaint:    Chief Complaint   Patient presents with   • Follow-up       History of Present Illness: Alysia Aguayo is a 76 y.o. female with known medical diagnoses of arthritis, anxiety, CAD status post stent (Dr. Bell), GERD, hyperlipidemia, hypertension, kidney disease, spastic colon, as well as a history of tobacco and alcohol abuse.  She presented to [facility] ED on 2022 with concerns of left arm paresthesias, left facial droop, left hand incoordination, and gait instability. In the ED, all of her symptoms resolved other than the left arm paresthesia and incoordination in her hand.     She presents to clinic today for a hospital follow up and to establish care.     Patient states that since being discharged from the hospital she has been doing fairly well. She states that she does continue to have some difficulty with ambulation and notes that she is unable to get to PT as she cannot drive and her granddaughter owns two businesses and has a difficult time getting off work to take her to appointments. She is requesting a home health referral to help improve her strength. She notes that she also has episodes of dizziness several times a day.     Stroke Risk Factors: Hypertension, hyperlipidemia, CAD, former alcohol and tobacco abuse      Subjective      Review of Systems:   Review of Systems   HENT: Positive for hearing loss and tinnitus.    Musculoskeletal: Positive for arthralgias, back pain, neck pain and neck stiffness.   Neurological: Positive for dizziness, light-headedness and headache.       Past Medical History:   Past Medical History:   Diagnosis Date   • Arthritis    • Back pain    • Carotid artery disease (HCC)    • Coronary artery disease    • Depression    • GERD (gastroesophageal  reflux disease)    • History of alcoholism (HCC)    • Hyperlipidemia    • Hypertension    • Kidney disease     was stage 4 but taking excessive amounts of ibuprofen, f/u with nephrologist at the time, resolved since and was to see nephrologist as needed    • Neck pain    • Spastic colon    • Spinal stenosis    • Stroke (HCC)    • Wears dentures    • Wears reading eyeglasses        Past Surgical History:   Past Surgical History:   Procedure Laterality Date   • CARDIAC CATHETERIZATION  2010    x2    • CATARACT EXTRACTION Bilateral    • CHOLECYSTECTOMY     • COLONOSCOPY  2016   • CORONARY STENT PLACEMENT  2010    x3    • ESOPHAGEAL DILATATION     • HYSTERECTOMY     • LUMBAR DISCECTOMY  07/10/2014    Rt- L1/2 Dr. Dionicio Pollard    • LUMBAR LAMINECTOMY DISCECTOMY DECOMPRESSION N/A 10/17/2019    Procedure: LUMBAR LAMINECTOMY L3-5;  Surgeon: Dionicio Pollard MD;  Location: [facility];  Service: Neurosurgery   • TUBAL ABDOMINAL LIGATION     • UPPER GASTROINTESTINAL ENDOSCOPY         Family History:   Family History   Problem Relation Age of Onset   • Heart disease Mother    • Emphysema Mother    • Coronary artery disease Mother    • Cancer Father    • Lung cancer Father    • Colon cancer Neg Hx        Social History:   Social History     Socioeconomic History   • Marital status:    Tobacco Use   • Smoking status: Former Smoker     Packs/day: 1.50     Years: 40.00     Pack years: 60.00     Types: Cigarettes     Quit date: 2000     Years since quittin.6   • Smokeless tobacco: Never Used   Vaping Use   • Vaping Use: Never used   Substance and Sexual Activity   • Alcohol use: No     Comment: hasn't used since , hx of alcoholism   • Drug use: No   • Sexual activity: Defer       Medications:     Current Outpatient Medications:   •  acetaminophen (TYLENOL) 325 MG tablet, Take 650 mg by mouth Every 4 (Four) Hours As Needed for Mild Pain  or Moderate Pain ., Disp: , Rfl:   •  amLODIPine (NORVASC) 5 MG tablet,  Take 2.5 mg by mouth 2 (Two) Times a Day., Disp: , Rfl:   •  aspirin 81 MG chewable tablet, Chew 81 mg Daily., Disp: , Rfl:   •  atorvastatin (LIPITOR) 80 MG tablet, Take 1 tablet by mouth Every Night., Disp: 90 tablet, Rfl: 0  •  clopidogrel (PLAVIX) 75 MG tablet, Take 1 tablet by mouth Daily., Disp: 30 tablet, Rfl: 1  •  Diclofenac Sodium (VOLTAREN) 1 % gel gel, Apply 4 g topically to the appropriate area as directed 4 (Four) Times a Day As Needed (pain)., Disp: 4 g, Rfl: 5  •  Esomeprazole Magnesium (NEXIUM PO), Take 75 mg by mouth Before Breakfast., Disp: , Rfl:   •  FLUoxetine (PROzac) 40 MG capsule, Take 1 capsule by mouth Daily., Disp: 90 capsule, Rfl: 1  •  glycopyrrolate (ROBINUL) 1 MG tablet, Take 1 mg by mouth 2 (Two) Times a Day., Disp: , Rfl:   •  meclizine (ANTIVERT) 25 MG tablet, Take 1 tablet by mouth 3 (Three) Times a Day As Needed for Dizziness., Disp: 90 tablet, Rfl: 1    Allergies:   Allergies   Allergen Reactions   • Penicillins Hives   • Codeine Nausea And Vomiting   • Imodium A-D [Loperamide Hcl] Nausea And Vomiting       Objective     Physical Exam:  Vital Signs:   Vitals:    08/25/22 0912   BP: 152/84   Pulse: 103   Temp: 98 °F (36.7 °C)   SpO2: 91%   Weight: 71.2 kg (157 lb)     Body mass index is 27.82 kg/m².     Physical Exam  Vitals reviewed.   Constitutional:       General: She is awake. She is not in acute distress.  HENT:      Head: Normocephalic and atraumatic.   Eyes:      Extraocular Movements: Extraocular movements intact.      Pupils: Pupils are equal, round, and reactive to light.   Cardiovascular:      Rate and Rhythm: Normal rate and regular rhythm.      Pulses: Normal pulses.      Heart sounds: No murmur heard.  Pulmonary:      Effort: Pulmonary effort is normal. No respiratory distress.   Abdominal:      Palpations: Abdomen is soft.   Musculoskeletal:      Right lower leg: No edema.      Left lower leg: No edema.   Skin:     General: Skin is warm and dry.   Neurological:       Mental Status: She is alert.      Cranial Nerves: No cranial nerve deficit.      Sensory: No sensory deficit.      Motor: Weakness present.      Coordination: Coordination is intact. Coordination normal.      Gait: Gait abnormal.   Psychiatric:         Mood and Affect: Mood normal.         Speech: Speech normal.         Behavior: Behavior normal.       Neurological Exam  Mental Status  Awake and alert. Oriented to person, place, time and situation. Speech is normal. Language is fluent with no aphasia. Fund of knowledge is appropriate for level of education.    Cranial Nerves  CN II: Right normal visual field. Left normal visual field.  CN III, IV, VI: Extraocular movements intact bilaterally. Pupils equal round and reactive to light bilaterally.  CN V:  Right: Facial sensation is normal.  Left: Facial sensation is normal on the left.  CN VII:  Right: There is no facial weakness.  Left: There is no facial weakness.  CN IX, X: Palate elevates symmetrically  CN XII: Tongue midline without atrophy or fasciculations.    Motor   Normal muscle tone. Strength is 5/5 in all four extremities except as noted.  LUE and LLE -4/5   .    Sensory  Light touch is normal in upper and lower extremities.     Coordination    Finger-to-nose, rapid alternating movements and heel-to-shin normal bilaterally without dysmetria.    Gait   Abnormal gait.Casual gait: Wide stance. Reduced stride length.       NIH Stroke Scale    1a  Level of consciousness: 0=alert; keenly responsive   1b. LOC questions:  0=Answers both questions correctly    1c. LOC commands: 0=Performs both tasks correctly   2.  Best Gaze: 0=normal   3. Visual: 0=No visual loss   4. Facial Palsy: 0=Normal symmetric movement   5a. Motor left arm: 0=No drift, limb holds 90 (or 45) degrees for full 10 seconds   5b.  Motor right arm: 0=No drift, limb holds 90 (or 45) degrees for full 10 seconds   6a. Motor left le=No drift, limb holds 90 (or 45) degrees for full 10 seconds   6b   Motor right le=No drift, limb holds 90 (or 45) degrees for full 10 seconds   7. Limb Ataxia: 0=Absent   8.  Sensory: 0=Normal; no sensory loss   9. Best Language:  0=No aphasia, normal   10. Dysarthria: 0=Normal   11. Extinction and Inattention: 0=No abnormality    Total:   0         Modified [place] Score: 2        0  No Symptoms    1 No significant disability. Able to carry out all usual activities, despite some symptoms.    2 Slight disability. Able to look after own affairs without assistance, but unable to carry out all previous activities.    3 Moderate disability. Requires some help, but able to walk unassisted.    4 Moderately severe disability. Unable to attend to own bodily needs without assistance, and unable to walk unassisted.    5 Severe disability. Requires constant nursing care and attention, bedridden, incontinent.    6 Dead        PHQ-9 Depression Screening  Little interest or pleasure in doing things? 0-->not at all   Feeling down, depressed, or hopeless? 0-->not at all   Trouble falling or staying asleep, or sleeping too much?     Feeling tired or having little energy?     Poor appetite or overeating?     Feeling bad about yourself - or that you are a failure or have let yourself or your family down?     Trouble concentrating on things, such as reading the newspaper or watching television?     Moving or speaking so slowly that other people could have noticed? Or the opposite - being so fidgety or restless that you have been moving around a lot more than usual?     Thoughts that you would be better off dead, or of hurting yourself in some way?     PHQ-9 Total Score 0   If you checked off any problems, how difficult have these problems made it for you to do your work, take care of things at home, or get along with other people?         Imaging Reviewed:     CT head without contrast  IMPRESSION:  No acute intracranial abnormality is identified on head CT.    CTA head and neck  IMPRESSION:  1.   Mild narrowing of proximal ICAs by calcified plaque.  2.  Focal narrowing of a distal superior right MCA branch.    CT Perfusion  IMPRESSION:  No focal area of decreased cerebral blood flow (CBF) is seen to suggest  an acute infarct in a large vessel territory    MRI brain without contrast  IMPRESSION:  1. Scattered small acute/subacute infarcts in the right MCA territory  clustered in the posterior insular region. No hemorrhage.  2. Mild underlying chronic small vessel ischemic change.  3. Mild ethmoid sinus mucosal disease.    TTE  · Saline test results are negative.  · Estimated left ventricular EF = 60% Left ventricular systolic function is normal.    Holter Monitor  · 13 short runs of SVT, longest 17 beats, rare PACs.  · Overall benign study.    Laboratory Results:   Lipid panel 5/31/2022     Triglycerides 123  HDL 53  LDL 74    6/27/2022  AST 27/ALT 16  HgbA1C 5.4  Hgb 12.8/Hct 38.8  Platelets 277  Assessment / Plan      Assessment/Plan:   Diagnoses and all orders for this visit:    1. History of stroke (Primary)  -     Ambulatory Referral to Home Health (Outpatient)  - Continue Aspirin 81 mg daily  - Continue Plavix 75mg daily  - Event monitor negative for atrial arrhythmias    2. Essential hypertension       - BP in clinic today 152/84, goal <130/80, patient states her BP at home is typically well controlled and in the 130s       - Continue home antihypertensives       - Continued management per PCP    3. Mixed hyperlipidemia       - Most recent LDL 74, goal for secondary stroke prevention <70       - Patient states she has an appointment with her PCP in November and will have her lipid panel rechecked at that time       - Continue lipitor 80mg nightly    4. Dizziness  -     meclizine (ANTIVERT) 25 MG tablet; Take 1 tablet by mouth 3 (Three) Times a Day As Needed for Dizziness.  Dispense: 90 tablet; Refill: 1  - Referral for home health sent  - Encouraged patient to change positions slowly and use  assistive devices/stabilizers as needed      Discussed the importance of medication compliance and lifestyle modifications (adequate blood pressure control, adequate control of hyperlipidemia, adequate glycemic control, increase physical activity, and healthy diet) to help reduce the risk of future cerebrovascular events.  Also discussed the signs symptoms that would warrant the patient return back to the emergency department including unilateral weakness, unilateral numbness, visual disturbances, loss of balance, speech difficulties, and/or a sudden severe headache.  Patient agrees and verbalizes understanding.      Follow Up:   Return in about 3 months (around 12/5/2022) for labs prior to next visit.    HUE Baker  [facility]

## 2022-08-29 RX ORDER — ATORVASTATIN CALCIUM 80 MG/1
80 TABLET, FILM COATED ORAL NIGHTLY
Qty: 90 TABLET | Refills: 1 | Status: SHIPPED | OUTPATIENT
Start: 2022-08-29 | End: 2022-10-06 | Stop reason: SDUPTHER

## 2022-08-29 NOTE — TELEPHONE ENCOUNTER
Rx Refill Note  Requested Prescriptions     Pending Prescriptions Disp Refills   • atorvastatin (LIPITOR) 80 MG tablet 90 tablet 0     Sig: Take 1 tablet by mouth Every Night.      Last office visit with prescribing clinician: 8/25/2022      Next office visit with prescribing clinician:  12/5/22  Nathalia Santillan MA  08/29/22, 09:38 EDT     Last Fill: 6/29/22

## 2022-08-30 ENCOUNTER — TELEPHONE (OUTPATIENT)
Dept: PAIN MEDICINE | Facility: CLINIC | Age: 77
End: 2022-08-30

## 2022-08-30 NOTE — TELEPHONE ENCOUNTER
Patient called to say that she has blood thinner clearance for an upcoming surgery for her eyes and would like to know if she could be scheduled for a procedure for an injection during the same time so that she doesn't have to go off of her blood thinner twice.

## 2022-09-12 ENCOUNTER — TELEPHONE (OUTPATIENT)
Dept: FAMILY MEDICINE CLINIC | Facility: CLINIC | Age: 77
End: 2022-09-12

## 2022-09-12 NOTE — TELEPHONE ENCOUNTER
Caller: Alysia Aguayo    Relationship to patient: Self    Best call back number: [phone]    Patient is needing: PATIENT CALLED ASKING FOR AN UPDATE ON A LETTER REQUESTING [facility]'S AUTHORIZATION FOR EYE SURGERY THAT WAS SUPPOSED TO HAVE BEEN SENT BY TERESA NELSON LAST WEEK IN RELATION TO HER BLOOD THINNER MEDICATION. PLEASE ADVISE.

## 2022-09-13 ENCOUNTER — TELEPHONE (OUTPATIENT)
Dept: NEUROLOGY | Facility: CLINIC | Age: 77
End: 2022-09-13

## 2022-09-13 NOTE — TELEPHONE ENCOUNTER
Spoke to Ms. Dede and relayed the message from our provider.  She verbalized understanding to STOP the 75mg Plavix and 81mg aspirin and START taking a 325mg aspirin daily and verbalized no further questions.

## 2022-09-13 NOTE — TELEPHONE ENCOUNTER
Provider: CALVIN  Caller: PATIENT  Relationship to Patient: SELF  Pharmacy: [facility] BY MAIL  Phone Number: [phone]  Reason for Call: PATIENT TELEPHONED; SAID HER PCP IS NOT ABLE TO FILL HER PRESCRIPTION FOR CLOPIDOGREL (PLAVIX) 75 MG TABLET THAT IT WOULD BE THE RESPONSIBILITY OF NEUROLOGY.    ALSO, PATIENT IS HAVING EYE SURGERY & WILL NEED A LETTER STATING THAT PATIENT CAN COME OFF THE PLAVIX.    PLEASE SEND LETTER TO:    [facility]  [address]  [place] [address] [phone]    CALL WITH ANY QUESTIONS.    THANK YOU.

## 2022-09-13 NOTE — TELEPHONE ENCOUNTER
I got a preop form so would need to be seen for that for us to fill out if needed  but she will need to check with neurology regarding her blood thinner if they are needing to know if can discontinue- thanks

## 2022-09-15 ENCOUNTER — TELEPHONE (OUTPATIENT)
Dept: PAIN MEDICINE | Facility: CLINIC | Age: 77
End: 2022-09-15

## 2022-09-15 NOTE — TELEPHONE ENCOUNTER
I called patient to let her know that we cannot schedule injections for her as she has not seen us in 6 months, and that she would have to take our next available and I will inform her when another opens.  Pt was understanding.

## 2022-09-15 NOTE — TELEPHONE ENCOUNTER
Caller: PATIENT     Relationship to patient: SELF     Best call back number: [phone]    Chief complaint: NECK PAIN     Type of visit: FOLOW UP     Requested date:       Additional notes: PT. HAD TRIGGER POINT INJECTIONS IN MARCH 23RD, 2022.   SHE IS HAVING NECK PAIN AND TROUBLE TURNING HER NECK   SHE HAS BEEN WAITING FOR EYE SURGERY TO BE SCHEDULED- THEY HAD HER STOP TAKING HER BLOOD THINNER ON Monday, BUT SHE IS TAKING 325 MG. ASPIRIN.   NO BLOOD THINNERS AT ALL SINCE Monday- SHE HAS NOT YET STARTED THE ASPIRIN.   NOT SURE IF SHE CAN SCHEDULE ANOTHER TRIGGER POINT INJECTION.  PLEASE CALL TO ADVISE.

## 2022-09-27 NOTE — PROGRESS NOTES
"Type of Service: Consult via telemedicine  Basis for telemedicine: COVID-19 pandemic  Mode of transmission: Telephone (patient's preference/patient's lack of access to MyChart/video).  Telemedicine Provider: HUE Tatum  Location of Physician: Office   Patient location: Home   Facilitator/intake: Provider  You have chosen to receive care through a telephone visit today. Do you consent to use a telephone visit for your medical care today?   Yes   All communications with the patient were documented in the patient's medical record per documentation standards.        Chief Complaint: \"Neck pain and headaches.\"     History of Present Illness: Ms. Alysia Aguayo, 77 y.o. female originally referred by Dr. Dionicio Pollard in consultation for chronic neck pain.  She also has an ongoing history of left occipital neuralgia, from which she underwent left greater occipital nerve blocks combined with trigger point injections at the bilateral levator scapulae and bilateral trapezius, performed on March 23, 2022, which has provided her with 70 to 80% reduction of her pain and headaches, lasting 5-1/2 months.  More recently over the past several weeks, she contacted the office in regards to a follow-up appointment to address her recurrent occipital headaches as well as her myofascial pain.  Additionally in regards to her mechanical neck pain she underwent cervical facet joint injections bilateral C5-C6 and bilateral C6-C7, from which she reports experiencing about 50-60% pain relief and functional improvement lasting about seven months.  She is currently experiencing daily left-sided occipital headaches. She did not participate in physical therapy. She request consultation today for reevaluation of her recurrent occipital headaches.    Pain history: Patient reports a 20+-year history of pain, which began after riding a roller coaster.   Pain description: constant pain with intermittent exacerbation, described as dull/tooth " ache and throbbing sensation.   Radiation of pain: The neck pain radiates into the left occipital region and into bilateral shoulder blades (LT>RT)  Pain intensity today: 6/10  Average pain intensity last week: 5/10  Pain intensity ranges from: 5/10 to 8/10  Aggravating factors:  Pain increases with flexion, extension and rotation of the cervical spine.   Alleviating factors: Pain decreases with Tylenol, ice, heat, icy hot,   Associated symptoms:   Patient denies pain, numbness or weakness in the upper extremities. She reports an some intertmittent tingling in her LUE.  Patient denies any new bladder or bowel problems.   Patient denies difficulties with her balance or recent falls.   Patient reports daily left occipital headaches associated with her neck pain lasting off and on throughout the day.      Review of previous therapies and additional medical records:  Alysia Aguayo has already failed the following measures, including:   Conservative measures: oral analgesics, physical therapy, ice and heat   Interventional measures: 03/23/2022: left greater occipital nerve block combined with trigger point injection at the bilateral levator scapulae and bilateral trapezius  03/17/2021: left greater occipital nerve block combined with trigger point injection at the bilateral levator scapulae and bilateral trapezius  10/05/2020: left greater occipital nerve block combined with trigger point injection at the bilateral levator scapulae and bilateral trapezius  01/20/2020: left greater occipital NB, and TPI at the left levator scapulae  09/11/2019: DxTx left greater occipital NB, and TPI at the left levator scapulae  07/24/2019: diagnostic and therapeutic cervical facet joint injections bilateral C5-C6 and C6-C7 combined with trigger point injections at the bilateral levator scapula and bilateral trapezius  Patient underwent diagnostic/therapeutic right cervical facet joint injections at C5-C6, C6-C7, combined with trigger  point injections at the right trapezius, and right levator scapulae on 07/22/2015 and reports complete pain relief for almost 2 years.   Surgical measures: No history of cervical spine surgery  Alysia Aguayo underwent neurosurgical consultation with Dr. Dionicio Pollard on 05/12/2015, and was found not to be a surgical candidate for her neck.  Alysia Aguayo presents with significant comorbidities including depression, hypertension, hyperlipidemia, coronary artery disease; engaged in treatment.   In terms of current analgesics, Alysia Aguayo takes: Tylenol, tizanidine, Voltaren gel. Patient also takes Prozac  I have reviewed Karthik Report is consistent to medication reconciliation.    Global Pain Scale 08-21 2018 05-20 2019 07-18 2019 08-29 2019 01-06- 2020 09-20 2020 05-25 2021 03-07 2022       Pain 15 12 13 20 8 18 13 17       Feelings 0 0 2   4 3 4 1 8       Clinical outcomes 3 3 1   3 9 7 2 10       Activities 9 0 2 25 3 13 13 3       GPS Total: 27 15 18 52 23 42 29 38         Review of Diagnostic Studies:   There are no new recent diagnostics for review  MRI of the cervical spine without contrast on February 20, 2019, radiology report: Mild to moderate degenerative disc disease in the lower cervical spine.  Bone marrow signal is within normal limits.  Visualized portions of the posterior fossa are unremarkable.  Cervical cord demonstrates normal course, caliber, and signal characteristics.   C2-C3: Moderate facet arthrosis causing mild right neuroforaminal stenosis.  Left neuroforamina is preserved.  The central canal is intact.    C3-C4: Mild diffuse disc osteophytes and uncovertebral degenerative change on the left causing mild to moderate left foraminal stenosis.  Right neuroforaminal is preserved.  Mild canal stenosis  C4-C5: Mild diffuse disc osteophyte with moderate right facet arthrosis.  Mild bilateral neuroforaminal stenosis.  Mild canal stenosis  C5-C6: Diffuse disc osteophyte and  uncovertebral degenerative change.  Advanced bilateral neuroforaminal stenosis and mild canal stenosis  C6-C7: Diffuse disc osteophyte and uncovertebral degenerative change causing at least mild central canal stenosis with mild impingement of the ventral cord and advanced bilateral neuroforaminal stenosis  C7-T1: No significant disc herniation or protrusion.  No canal stenosis or foraminal stenosis.  MRI of the lumbar spine without contrast on February 20, 2019, radiology report, alignment is normal.  Vertebral heights are well-maintained.  Normal bone marrow signal.  Conus medullaris is unremarkable.  L1-L2: Diffuse annular bulge and moderate facet arthrosis.  Right hemilaminectomy.  There is mild bilateral subarticular recess stenosis.  Moderate to advanced right foraminal stenosis.  L2-L3: Diffuse annular bulge and moderate facet arthrosis with left asymmetric posterolateral protrusion.  Moderate to advanced central canal stenosis and advanced left subarticular recess stenosis.  Mild bilateral neuroforaminal stenosis  L3-L4: Diffuse annular bulge and moderate facet arthrosis causing advanced central canal stenosis.  Mild right neuroforaminal stenosis.  Broad-based left foraminal protrusion with advanced left foraminal stenosis with impingement of the exiting left L3 nerve root.  L4-L5: Diffuse annular bulge and moderate facet arthrosis causing moderate to advanced central canal stenosis.  Mild bilateral neuroforaminal stenosis  L5-S1: Diffuse annular bulge and moderate facet arthrosis resulting in mild bilateral subarticular recess stenosis.  Mild bilateral neuroforaminal stenosis  XR SPINE, LUMBAR, AP AND LATERAL WITH FLEXION AND EXTENSION-03/06/2019:  Extensive degenerative changes seen at the L1/L2 level. Slight scoliotic curvature with convexity to the right. Alignment stable in flexion and extension.    Review of Systems   Musculoskeletal: Positive for neck pain and neck stiffness.   Neurological: Positive  for headaches.   All other systems reviewed and are negative.     Patient Active Problem List   Diagnosis   • Cervical spondylosis without myelopathy   • Myofascial pain   • Frequent headaches   • History of alcohol abuse   • CAD s/p stent placement   • History of lumbar laminectomy for spinal cord decompression   • Occipital neuralgia of left side   • Lumbar stenosis with neurogenic claudication   • DDD (degenerative disc disease), lumbar   • Anxiety and Depression   • Left arm numbness   • Essential hypertension   • Mixed hyperlipidemia   • Stroke-like symptoms       Past Medical History:   Diagnosis Date   • Arthritis    • Back pain    • Carotid artery disease (HCC)    • Coronary artery disease    • Depression    • GERD (gastroesophageal reflux disease)    • History of alcoholism ([facility])    • Hyperlipidemia    • Hypertension    • Kidney disease     was stage 4 but taking excessive amounts of ibuprofen, f/u with nephrologist at the time, resolved since and was to see nephrologist as needed    • Neck pain    • Spastic colon    • Spinal stenosis    • Stroke (HCC) 2022   • Wears dentures    • Wears reading eyeglasses          Past Surgical History:   Procedure Laterality Date   • CARDIAC CATHETERIZATION  2010    x2    • CATARACT EXTRACTION Bilateral    • CHOLECYSTECTOMY     • COLONOSCOPY  2016   • CORONARY STENT PLACEMENT  2010    x3    • ESOPHAGEAL DILATATION     • HYSTERECTOMY     • LUMBAR DISCECTOMY  07/10/2014    Rt- L1/2 Dr. Dionicio Pollard    • LUMBAR LAMINECTOMY DISCECTOMY DECOMPRESSION N/A 10/17/2019    Procedure: LUMBAR LAMINECTOMY L3-5;  Surgeon: Dionicio Pollard MD;  Location: [facility];  Service: Neurosurgery   • TUBAL ABDOMINAL LIGATION     • UPPER GASTROINTESTINAL ENDOSCOPY           Family History   Problem Relation Age of Onset   • Heart disease Mother    • Emphysema Mother    • Coronary artery disease Mother    • Cancer Father    • Lung cancer Father    • Colon cancer Neg Hx          Social History      Socioeconomic History   • Marital status:    Tobacco Use   • Smoking status: Former Smoker     Packs/day: 1.50     Years: 40.00     Pack years: 60.00     Types: Cigarettes     Quit date: 2000     Years since quittin.7   • Smokeless tobacco: Never Used   Vaping Use   • Vaping Use: Never used   Substance and Sexual Activity   • Alcohol use: No     Comment: hasn't used since , hx of alcoholism   • Drug use: No   • Sexual activity: Defer           Current Outpatient Medications:   •  acetaminophen (TYLENOL) 325 MG tablet, Take 650 mg by mouth Every 4 (Four) Hours As Needed for Mild Pain  or Moderate Pain ., Disp: , Rfl:   •  amLODIPine (NORVASC) 5 MG tablet, Take 2.5 mg by mouth 2 (Two) Times a Day., Disp: , Rfl:   •  aspirin 81 MG chewable tablet, Chew 81 mg Daily., Disp: , Rfl:   •  atorvastatin (LIPITOR) 80 MG tablet, Take 1 tablet by mouth Every Night., Disp: 90 tablet, Rfl: 1  •  clopidogrel (PLAVIX) 75 MG tablet, Take 1 tablet by mouth Daily., Disp: 30 tablet, Rfl: 1  •  Diclofenac Sodium (VOLTAREN) 1 % gel gel, Apply 4 g topically to the appropriate area as directed 4 (Four) Times a Day As Needed (pain)., Disp: 4 g, Rfl: 5  •  Esomeprazole Magnesium (NEXIUM PO), Take 75 mg by mouth Before Breakfast., Disp: , Rfl:   •  FLUoxetine (PROzac) 40 MG capsule, Take 1 capsule by mouth Daily., Disp: 90 capsule, Rfl: 1  •  glycopyrrolate (ROBINUL) 1 MG tablet, Take 1 mg by mouth 2 (Two) Times a Day., Disp: , Rfl:   •  meclizine (ANTIVERT) 25 MG tablet, Take 1 tablet by mouth 3 (Three) Times a Day As Needed for Dizziness., Disp: 90 tablet, Rfl: 1      Allergies   Allergen Reactions   • Penicillins Hives   • Codeine Nausea And Vomiting   • Imodium A-D [Loperamide Hcl] Nausea And Vomiting         There were no vitals taken for this visit.      Due to the constraints of the telemedicine format, no physical exam was performed     Physical Exam: (Previous PE from last office visit)  Constitutional: Patient  is oriented to person, place, and time. Patient appears well-developed and well-nourished.   HENT: Head: Normocephalic and atraumatic. Eyes: Conjunctivae and lids are normal. Pupils: Equal, round, reactive to light.   Neck: Trachea normal. Neck supple. No JVD present.   Lymphatic: No cervical adenopathy  Pulmonary Respiratory effort: No increased work of breathing or signs of respiratory distress.   Musculoskeletal   Gait and station: Gait evaluation demonstrated a normal gait   Cervical spine: Passive and active range of motion are almost full and but reproduce pain. Flexion, extension and rotation of the cervical spine increased and reproduced pain. Cervical facet joint loading maneuvers are positive. Palpation of the left occipital and suboccipital region reproduces pain  Muscles: Presence of active trigger points at the bilateral levator scapulae and bilateral trapezius   Shoulders: The range of motion of the glenohumeral joints is full and without pain, there is pain with ROM on the left. Rotator cuff strength is 5/5.   Neurological: Patient is alert and oriented to person, place, and time. Speech: speech is normal. Cortical function: Normal mental status.   Cranial nerves: Cranial nerves 2-12 intact.   Reflex Scores:  Right brachioradialis: 3+  Left brachioradialis: 3+  Right biceps: 3+  Left biceps: 3+  Right triceps: 3+  Left triceps: 3+  Motor strength: 5/5  Motor Tone: normal tone.   Involuntary movements: none.   Superficial/Primitive Reflexes: primitive reflexes were absent.   Right Fox: absent  Left Fox: absent  Right ankle clonus: absent  Left ankle clonus: absent   Spurling sign is negative. Lhermitte sign is negative. Negative long tract signs.  Sensation: No sensory loss. Sensory exam: intact to light touch, intact pain and temperature sensation, intact vibration sensation and normal proprioception.   Coordination: Normal finger to nose and heel to shin. Normal balance and negative.  Romberg's sign negative.   Skin and subcutaneous tissue: Skin is warm and intact. No rash noted. No cyanosis.   Psychiatric: Judgment and insight: Normal. Orientation to person, place and time: Normal. Recent and remote memory: Intact. Mood and affect: Normal.     ASSESSMENT:   1. Occipital neuralgia of left side    2. Myofascial pain    3. Cervical spondylosis without myelopathy    4. DDD (degenerative disc disease), lumbar    5. History of lumbar laminectomy for spinal cord decompression    6. History of coronary artery stent placement         PLAN/MEDICAL DECISION MAKING: Ms. Alysia Aguayo, 77 y.o. female has a longstanding history of chronic neck pain associated with occipital headaches.  MRI of the cervical spine without contrast performed in 2019, revealed multilevel disc degeneration with facet arthritis.  There are multiple levels that contain spinal canal as well as neural foraminal stenosis.  She has been evaluated by neurosurgery in the past for her neck pain, Dr. Dionicio Pollard, and was found not to be a surgical candidate.  Over the years, she has remarkably responded to interventional pain management measures as well as conservatism.  She continues with mechanical/axial neck pain, as well as predominantly left-sided occipital neuralgia.  She continues to deny any obvious radicular complaints, pain or numbness.  I had a lengthy conversation with Ms. Alysia Aguayo regarding her chronic pain condition and potential therapeutic options including risks, benefits, alternative therapies, to name a few. Patient has failed to obtain pain relief with conservative measures, as referenced above. She continues to respond exceedingly well to interventional pain management procedures as referenced above.  I have reviewed all available patient's medical records as well as previous therapies as referenced above.  Therefore, I have proposed the following plan:  1. Interventional pain management measures:  Patient will be scheduled for left greater occipital nerve block by hydrodissection technique under ultrasound and fluoroscopic guidance combined with trigger point injections at the bilateral levator scapulae and bilateral trapezius to maximize her rehabilitation.  Depending on her continued response we may also consider repeating bilateral cervical facet joint injections at C5-C6, C6-C7. If patient fails to obtain sustained pain relief, then, we will proceed with diagnostic bilateral cervical medial branch blocks at C4, C5, C6; for bilateral cervical facet joints at C5-C6, C6-C7 to clarify the origin of her unrelenting pain. If patient experiences more than 80% pain relief along with significant improvement in the range of motion of the cervical spine, then, patient will be scheduled for a second set of diagnostic bilateral cervical medial branch blocks, to then, proceed with cervical medial branch rhizotomies. I have also discussed with the patient the possibility of JULIA if she continues to struggle with pain, she is currently not taking blood thinners at the moment.  2. Diagnostics:  A. Depending on continued response, we may consider repeating MRI of the cervical spine without contrast.  3. Pharmacological measures. Reviewed and Discussed.   A. Tylenol. Patient also takes Prozac  B. Continue Voltaren gel  C. Continue tizanidine 2 mg take ½ tablet three times a day as needed for muscle spasms  D. Continue Rhuemate one capsule daily  4.  Long-term rehabilitation efforts:  A. The patient does not have a history of falls. I did complete a risk assessment for falls.   B. Patient will start a comprehensive physical therapy program for water therapy, upper body strengthening/posture correction, gait and balance training, neurodynamics, myofascial release, cupping and dry needling   C. Start an exercise program such as yoga and water therapy  D. Use TENS unit on regular basis  E. Contrast therapy: Apply ice-packs for  15-20 minutes, followed by heating pads for 15-20 minutes to affected area   5. The patient has been instructed to contact my office with any questions or difficulties. The patient understands the plan and agrees to proceed accordingly.    A phone visit was used to complete this visit. Total time of discussion was 12 minutes.     Pain Medications             acetaminophen (TYLENOL) 325 MG tablet Take 650 mg by mouth Every 4 (Four) Hours As Needed for Mild Pain  or Moderate Pain .    aspirin 81 MG chewable tablet Chew 81 mg Daily.    FLUoxetine (PROzac) 40 MG capsule Take 1 capsule by mouth Daily.             Patient Care Team:  Yesica Zhu PA-C as PCP - General  Dionicio Pollard MD as Consulting Physician (Neurosurgery)  Heriberto Lopes MD as Consulting Physician (Pain Medicine)  Alana Wang APRN as Nurse Practitioner (Pain Medicine)     No orders of the defined types were placed in this encounter.        Future Appointments   Date Time Provider Department Center   11/30/2022 10:45 AM Yesica Zhu PA-C [facility]   12/5/2022  2:30 PM [facility]         [facility] [place]

## 2022-09-28 ENCOUNTER — OFFICE VISIT (OUTPATIENT)
Dept: PAIN MEDICINE | Facility: CLINIC | Age: 77
End: 2022-09-28

## 2022-09-28 DIAGNOSIS — M54.81 OCCIPITAL NEURALGIA OF LEFT SIDE: Primary | ICD-10-CM

## 2022-09-28 DIAGNOSIS — M47.812 CERVICAL SPONDYLOSIS WITHOUT MYELOPATHY: ICD-10-CM

## 2022-09-28 DIAGNOSIS — Z95.5 HISTORY OF CORONARY ARTERY STENT PLACEMENT: ICD-10-CM

## 2022-09-28 DIAGNOSIS — M79.18 MYOFASCIAL PAIN: ICD-10-CM

## 2022-09-28 DIAGNOSIS — Z98.890 HISTORY OF LUMBAR LAMINECTOMY FOR SPINAL CORD DECOMPRESSION: ICD-10-CM

## 2022-09-28 DIAGNOSIS — M51.36 DDD (DEGENERATIVE DISC DISEASE), LUMBAR: ICD-10-CM

## 2022-09-28 DIAGNOSIS — M54.81 OCCIPITAL NEURALGIA OF LEFT SIDE: ICD-10-CM

## 2022-09-28 PROCEDURE — 99442 PR PHYS/QHP TELEPHONE EVALUATION 11-20 MIN: CPT | Performed by: NURSE PRACTITIONER

## 2022-10-05 ENCOUNTER — OUTSIDE FACILITY SERVICE (OUTPATIENT)
Dept: PAIN MEDICINE | Facility: CLINIC | Age: 77
End: 2022-10-05

## 2022-10-05 PROCEDURE — 20553 NJX 1/MLT TRIGGER POINTS 3/>: CPT | Performed by: ANESTHESIOLOGY

## 2022-10-05 PROCEDURE — 76942 ECHO GUIDE FOR BIOPSY: CPT | Performed by: ANESTHESIOLOGY

## 2022-10-05 PROCEDURE — 64405 NJX AA&/STRD GR OCPL NRV: CPT | Performed by: ANESTHESIOLOGY

## 2022-10-05 PROCEDURE — 99152 MOD SED SAME PHYS/QHP 5/>YRS: CPT | Performed by: ANESTHESIOLOGY

## 2022-10-06 RX ORDER — ATORVASTATIN CALCIUM 80 MG/1
80 TABLET, FILM COATED ORAL NIGHTLY
Qty: 90 TABLET | Refills: 1 | Status: SHIPPED | OUTPATIENT
Start: 2022-10-06 | End: 2022-12-07 | Stop reason: SDUPTHER

## 2022-10-06 NOTE — TELEPHONE ENCOUNTER
Rx Refill Note  Requested Prescriptions     Pending Prescriptions Disp Refills   • atorvastatin (LIPITOR) 80 MG tablet 90 tablet 1     Sig: Take 1 tablet by mouth Every Night.      Last office visit with prescribing clinician: 8/25/2022      Next office visit with prescribing clinician:12/5/22  Nathalia Santillan MA  10/06/22, 15:03 EDT

## 2022-10-07 ENCOUNTER — TELEPHONE (OUTPATIENT)
Dept: PAIN MEDICINE | Facility: CLINIC | Age: 77
End: 2022-10-07

## 2022-10-31 ENCOUNTER — APPOINTMENT (OUTPATIENT)
Dept: MRI IMAGING | Facility: HOSPITAL | Age: 77
DRG: 254 | End: 2022-10-31
Payer: MEDICARE

## 2022-10-31 ENCOUNTER — TELEPHONE (OUTPATIENT)
Dept: NEUROLOGY | Facility: CLINIC | Age: 77
End: 2022-10-31

## 2022-10-31 ENCOUNTER — APPOINTMENT (OUTPATIENT)
Dept: GENERAL RADIOLOGY | Facility: HOSPITAL | Age: 77
DRG: 254 | End: 2022-10-31
Payer: MEDICARE

## 2022-10-31 ENCOUNTER — APPOINTMENT (OUTPATIENT)
Dept: CT IMAGING | Facility: HOSPITAL | Age: 77
DRG: 254 | End: 2022-10-31
Payer: MEDICARE

## 2022-10-31 ENCOUNTER — HOSPITAL ENCOUNTER (INPATIENT)
Facility: HOSPITAL | Age: 77
LOS: 2 days | Discharge: HOME | DRG: 254 | End: 2022-11-04
Attending: EMERGENCY MEDICINE | Admitting: INTERNAL MEDICINE
Payer: MEDICARE

## 2022-10-31 DIAGNOSIS — R29.90 STROKE-LIKE SYMPTOMS: ICD-10-CM

## 2022-10-31 DIAGNOSIS — R29.898 LEFT LEG WEAKNESS: ICD-10-CM

## 2022-10-31 DIAGNOSIS — I63.9 CEREBROVASCULAR ACCIDENT (CVA), UNSPECIFIED MECHANISM: Primary | ICD-10-CM

## 2022-10-31 LAB
ALT SERPL W P-5'-P-CCNC: 14 U/L (ref 1–33)
APTT PPP: 28.9 SECONDS (ref 22–39)
AST SERPL-CCNC: 21 U/L (ref 1–32)
BASOPHILS # BLD AUTO: 0.05 10*3/MM3 (ref 0–0.2)
BASOPHILS NFR BLD AUTO: 0.6 % (ref 0–1.5)
BUN BLDA-MCNC: 26 MG/DL (ref 8–26)
CA-I BLDA-SCNC: 1.31 MMOL/L (ref 1.2–1.32)
CHLORIDE BLDA-SCNC: 106 MMOL/L (ref 98–109)
CO2 BLDA-SCNC: 21 MMOL/L (ref 24–29)
CREAT BLDA-MCNC: 1.2 MG/DL (ref 0.6–1.3)
DEPRECATED RDW RBC AUTO: 51.1 FL (ref 37–54)
EGFRCR SERPLBLD CKD-EPI 2021: 47 ML/MIN/1.73
EOSINOPHIL # BLD AUTO: 0.12 10*3/MM3 (ref 0–0.4)
EOSINOPHIL NFR BLD AUTO: 1.4 % (ref 0.3–6.2)
ERYTHROCYTE [DISTWIDTH] IN BLOOD BY AUTOMATED COUNT: 13.7 % (ref 12.3–15.4)
GLUCOSE BLDC GLUCOMTR-MCNC: 77 MG/DL (ref 70–130)
GLUCOSE BLDC GLUCOMTR-MCNC: 85 MG/DL (ref 70–130)
HCT VFR BLD AUTO: 41.8 % (ref 34–46.6)
HCT VFR BLDA CALC: 42 % (ref 38–51)
HGB BLD-MCNC: 13 G/DL (ref 12–15.9)
HGB BLDA-MCNC: 14.3 G/DL (ref 12–17)
HOLD SPECIMEN: NORMAL
IMM GRANULOCYTES # BLD AUTO: 0.02 10*3/MM3 (ref 0–0.05)
IMM GRANULOCYTES NFR BLD AUTO: 0.2 % (ref 0–0.5)
INR PPP: 1.1 (ref 0.8–1.2)
LYMPHOCYTES # BLD AUTO: 2.13 10*3/MM3 (ref 0.7–3.1)
LYMPHOCYTES NFR BLD AUTO: 24.9 % (ref 19.6–45.3)
MCH RBC QN AUTO: 31.6 PG (ref 26.6–33)
MCHC RBC AUTO-ENTMCNC: 31.1 G/DL (ref 31.5–35.7)
MCV RBC AUTO: 101.5 FL (ref 79–97)
MONOCYTES # BLD AUTO: 0.68 10*3/MM3 (ref 0.1–0.9)
MONOCYTES NFR BLD AUTO: 7.9 % (ref 5–12)
NEUTROPHILS NFR BLD AUTO: 5.57 10*3/MM3 (ref 1.7–7)
NEUTROPHILS NFR BLD AUTO: 65 % (ref 42.7–76)
NRBC BLD AUTO-RTO: 0 /100 WBC (ref 0–0.2)
PA ADP PRP-ACNC: 294 PRU
PLATELET # BLD AUTO: 253 10*3/MM3 (ref 140–450)
PMV BLD AUTO: 9.3 FL (ref 6–12)
POTASSIUM BLDA-SCNC: 3.9 MMOL/L (ref 3.5–4.9)
PROTHROMBIN TIME: 12.8 SECONDS (ref 12.8–15.2)
QT INTERVAL: 400 MS
QTC INTERVAL: 416 MS
RBC # BLD AUTO: 4.12 10*6/MM3 (ref 3.77–5.28)
SODIUM BLD-SCNC: 139 MMOL/L (ref 138–146)
TROPONIN T SERPL-MCNC: <0.01 NG/ML (ref 0–0.03)
WBC NRBC COR # BLD: 8.57 10*3/MM3 (ref 3.4–10.8)
WHOLE BLOOD HOLD COAG: NORMAL
WHOLE BLOOD HOLD SPECIMEN: NORMAL

## 2022-10-31 PROCEDURE — 93005 ELECTROCARDIOGRAM TRACING: CPT | Performed by: EMERGENCY MEDICINE

## 2022-10-31 PROCEDURE — 99223 1ST HOSP IP/OBS HIGH 75: CPT | Performed by: INTERNAL MEDICINE

## 2022-10-31 PROCEDURE — 70551 MRI BRAIN STEM W/O DYE: CPT

## 2022-10-31 PROCEDURE — 70544 MR ANGIOGRAPHY HEAD W/O DYE: CPT

## 2022-10-31 PROCEDURE — 84450 TRANSFERASE (AST) (SGOT): CPT | Performed by: EMERGENCY MEDICINE

## 2022-10-31 PROCEDURE — 85576 BLOOD PLATELET AGGREGATION: CPT | Performed by: NURSE PRACTITIONER

## 2022-10-31 PROCEDURE — 99222 1ST HOSP IP/OBS MODERATE 55: CPT | Performed by: NURSE PRACTITIONER

## 2022-10-31 PROCEDURE — 71045 X-RAY EXAM CHEST 1 VIEW: CPT

## 2022-10-31 PROCEDURE — 85730 THROMBOPLASTIN TIME PARTIAL: CPT | Performed by: EMERGENCY MEDICINE

## 2022-10-31 PROCEDURE — 70450 CT HEAD/BRAIN W/O DYE: CPT

## 2022-10-31 PROCEDURE — 84460 ALANINE AMINO (ALT) (SGPT): CPT | Performed by: EMERGENCY MEDICINE

## 2022-10-31 PROCEDURE — 85014 HEMATOCRIT: CPT

## 2022-10-31 PROCEDURE — G0378 HOSPITAL OBSERVATION PER HR: HCPCS

## 2022-10-31 PROCEDURE — 84484 ASSAY OF TROPONIN QUANT: CPT | Performed by: EMERGENCY MEDICINE

## 2022-10-31 PROCEDURE — 0042T HC CT CEREBRAL PERFUSION W/WO CONTRAST: CPT

## 2022-10-31 PROCEDURE — 0 IOPAMIDOL PER 1 ML: Performed by: EMERGENCY MEDICINE

## 2022-10-31 PROCEDURE — 70547 MR ANGIOGRAPHY NECK W/O DYE: CPT

## 2022-10-31 PROCEDURE — 85610 PROTHROMBIN TIME: CPT

## 2022-10-31 PROCEDURE — 82962 GLUCOSE BLOOD TEST: CPT

## 2022-10-31 PROCEDURE — 85025 COMPLETE CBC W/AUTO DIFF WBC: CPT | Performed by: EMERGENCY MEDICINE

## 2022-10-31 PROCEDURE — 99285 EMERGENCY DEPT VISIT HI MDM: CPT

## 2022-10-31 PROCEDURE — 80047 BASIC METABLC PNL IONIZED CA: CPT

## 2022-10-31 RX ORDER — ATORVASTATIN CALCIUM 40 MG/1
80 TABLET, FILM COATED ORAL NIGHTLY
Status: DISCONTINUED | OUTPATIENT
Start: 2022-10-31 | End: 2022-11-04 | Stop reason: HOSPADM

## 2022-10-31 RX ORDER — ASPIRIN 81 MG/1
81 TABLET, CHEWABLE ORAL DAILY
Status: DISCONTINUED | OUTPATIENT
Start: 2022-10-31 | End: 2022-11-04 | Stop reason: HOSPADM

## 2022-10-31 RX ORDER — CLOPIDOGREL BISULFATE 75 MG/1
75 TABLET ORAL DAILY
Status: DISCONTINUED | OUTPATIENT
Start: 2022-10-31 | End: 2022-11-04 | Stop reason: HOSPADM

## 2022-10-31 RX ORDER — ASPIRIN 300 MG/1
300 SUPPOSITORY RECTAL DAILY
Status: DISCONTINUED | OUTPATIENT
Start: 2022-10-31 | End: 2022-11-04 | Stop reason: HOSPADM

## 2022-10-31 RX ORDER — MECLIZINE HYDROCHLORIDE 25 MG/1
12.5 TABLET ORAL 3 TIMES DAILY PRN
COMMUNITY
End: 2022-12-07

## 2022-10-31 RX ORDER — SODIUM CHLORIDE 0.9 % (FLUSH) 0.9 %
10 SYRINGE (ML) INJECTION AS NEEDED
Status: DISCONTINUED | OUTPATIENT
Start: 2022-10-31 | End: 2022-11-03

## 2022-10-31 RX ORDER — ENOXAPARIN SODIUM 100 MG/ML
40 INJECTION SUBCUTANEOUS DAILY
Status: DISCONTINUED | OUTPATIENT
Start: 2022-11-01 | End: 2022-11-04 | Stop reason: HOSPADM

## 2022-10-31 RX ORDER — FLUOXETINE HYDROCHLORIDE 20 MG/1
40 CAPSULE ORAL DAILY
Status: DISCONTINUED | OUTPATIENT
Start: 2022-10-31 | End: 2022-11-04 | Stop reason: HOSPADM

## 2022-10-31 RX ORDER — SODIUM CHLORIDE 0.9 % (FLUSH) 0.9 %
10 SYRINGE (ML) INJECTION EVERY 12 HOURS SCHEDULED
Status: DISCONTINUED | OUTPATIENT
Start: 2022-10-31 | End: 2022-11-03

## 2022-10-31 RX ORDER — ASPIRIN 325 MG
325 TABLET ORAL DAILY
COMMUNITY
End: 2022-11-04 | Stop reason: HOSPADM

## 2022-10-31 RX ADMIN — FLUOXETINE 40 MG: 20 CAPSULE ORAL at 23:42

## 2022-10-31 RX ADMIN — IOPAMIDOL 40 ML: 755 INJECTION, SOLUTION INTRAVENOUS at 14:48

## 2022-10-31 RX ADMIN — ATORVASTATIN CALCIUM 80 MG: 40 TABLET, FILM COATED ORAL at 23:42

## 2022-10-31 RX ADMIN — IOPAMIDOL 115 ML: 755 INJECTION, SOLUTION INTRAVENOUS at 14:44

## 2022-10-31 RX ADMIN — Medication 10 ML: at 23:42

## 2022-10-31 RX ADMIN — CLOPIDOGREL BISULFATE 75 MG: 75 TABLET ORAL at 23:42

## 2022-10-31 NOTE — CONSULTS
Stroke Consult Note    Patient Name: Alysia Aguayo   MRN: [id number]  Age: 77 y.o.  Sex: female  : 1945    Primary Care Physician: Yesica Zhu PA-C  Referring Physician:  Clinton SANABRIA STROKE TEAM CALLED:1430 EST     TIME PATIENT SEEN: 1435 EST    Handedness: Right  Race:      Chief Complaint/Reason for Consultation: Waxing and waning LLE weakness    HPI: Alysia Aguayo is 77 yr old female with a PMH significant for RMCA stroke (2022, intermittent difficulty with ambulation), remote tobacco abuse, remote alcohol abuse, and spastic colon who presents to the [facility] ED with c/o worsening left lower extremity weakness and difficulty with ambulation, and word finding difficulty that began this morning @ 0330.  In addition, she reports that she noted an irregular heartbeat on her blood pressure machine this morning.  Patient initially called the office with symptoms this morning and was referred to the ER for further evaluation.    On arrival to [facility], symptoms are resolving back to baseline.  She does report intermittent LUE heaviness and intermittent difficulty with ambulation at baseline.  NIH currently 0.  CT head with no acute findings.  Creatinine is elevated to 1.9.  We will defer contrasted studies at this time.  MRI brain and MRA H/N pending.    Last Known Normal Date/Time: 0330 10/31/2022     Review of Systems   Constitutional: Negative for chills and fatigue.   Eyes: Negative for photophobia and visual disturbance.   Cardiovascular: Positive for palpitations.   Gastrointestinal: Negative for diarrhea, nausea and vomiting.   Musculoskeletal: Positive for gait problem.   Neurological: Positive for speech difficulty and weakness.   Psychiatric/Behavioral: Negative.       Past Medical History:   Diagnosis Date   • Arthritis    • Back pain    • Carotid artery disease (HCC)    • Coronary artery disease    • Depression    • GERD (gastroesophageal reflux disease)    • History of  alcoholism (HCC)    • Hyperlipidemia    • Hypertension    • Kidney disease     was stage 4 but taking excessive amounts of ibuprofen, f/u with nephrologist at the time, resolved since and was to see nephrologist as needed    • Neck pain    • Spastic colon    • Spinal stenosis    • Stroke (HCC)    • Wears dentures    • Wears reading eyeglasses      Past Surgical History:   Procedure Laterality Date   • CARDIAC CATHETERIZATION  2010    x2    • CATARACT EXTRACTION Bilateral    • CHOLECYSTECTOMY     • COLONOSCOPY  2016   • CORONARY STENT PLACEMENT  2010    x3    • ESOPHAGEAL DILATATION     • HYSTERECTOMY     • LUMBAR DISCECTOMY  07/10/2014    Rt- L1/2 Dr. Dionicio Pollard    • LUMBAR LAMINECTOMY DISCECTOMY DECOMPRESSION N/A 10/17/2019    Procedure: LUMBAR LAMINECTOMY L3-5;  Surgeon: Dionicio Pollard MD;  Location: [facility];  Service: Neurosurgery   • TUBAL ABDOMINAL LIGATION     • UPPER GASTROINTESTINAL ENDOSCOPY       Family History   Problem Relation Age of Onset   • Heart disease Mother    • Emphysema Mother    • Coronary artery disease Mother    • Cancer Father    • Lung cancer Father    • Colon cancer Neg Hx      Social History     Socioeconomic History   • Marital status:    Tobacco Use   • Smoking status: Former     Packs/day: 1.50     Years: 40.00     Pack years: 60.00     Types: Cigarettes     Quit date: 2000     Years since quittin.8   • Smokeless tobacco: Never   Vaping Use   • Vaping Use: Never used   Substance and Sexual Activity   • Alcohol use: No     Comment: hasn't used since , hx of alcoholism   • Drug use: No   • Sexual activity: Defer     Allergies   Allergen Reactions   • Penicillins Hives   • Codeine Nausea And Vomiting   • Imodium A-D [Loperamide Hcl] Nausea And Vomiting     Prior to Admission medications    Medication Sig Start Date End Date Taking? Authorizing Provider   acetaminophen (TYLENOL) 325 MG tablet Take 650 mg by mouth Every 4 (Four) Hours As Needed for Mild Pain   or Moderate Pain . 7/8/15   Juan R Macario MD   amLODIPine (NORVASC) 5 MG tablet Take 2.5 mg by mouth 2 (Two) Times a Day.    Juan R Macario MD   aspirin 81 MG chewable tablet Chew 81 mg Daily. 7/8/15   Juan R Macario MD   atorvastatin (LIPITOR) 80 MG tablet Take 1 tablet by mouth Every Night. 10/6/22   Magaly Quiros APRN   clopidogrel (PLAVIX) 75 MG tablet Take 1 tablet by mouth Daily. 7/22/22   Yesica Zhu PA-C   Diclofenac Sodium (VOLTAREN) 1 % gel gel Apply 4 g topically to the appropriate area as directed 4 (Four) Times a Day As Needed (pain). 1/19/21   Heriberto Lopes MD   Esomeprazole Magnesium (NEXIUM PO) Take 75 mg by mouth Before Breakfast.    Juan R Macario MD   FLUoxetine (PROzac) 40 MG capsule Take 1 capsule by mouth Daily. 5/31/22   Yesica Zhu PA-C   glycopyrrolate (ROBINUL) 1 MG tablet Take 1 mg by mouth 2 (Two) Times a Day. 2/16/22   Juan R Macario MD   meclizine (ANTIVERT) 25 MG tablet Take 1 tablet by mouth 3 (Three) Times a Day As Needed for Dizziness. 8/25/22   Magaly Quiros APRN         Temp:  [97.8 °F (36.6 °C)] 97.8 °F (36.6 °C)  Heart Rate:  [75] 75  Resp:  [18] 18  BP: (153)/(71) 153/71  Neurological Exam  Mental Status  Awake, alert and oriented to person, place and time.Alert. Oriented to person, place, and time. Speech is normal. Language is fluent with no aphasia. Attention and concentration are normal.    Cranial Nerves  CN II: Visual acuity is normal. Visual fields full to confrontation.  CN III, IV, VI: Extraocular movements intact bilaterally. Normal lids and orbits bilaterally. Pupils equal round and reactive to light bilaterally.  CN V: Facial sensation is normal.  CN VII: Full and symmetric facial movement.  CN IX, X: Palate elevates symmetrically  CN XI: Shoulder shrug strength is normal.  CN XII: Tongue midline without atrophy or fasciculations.    Motor   Strength is 5/5 throughout all four  extremities.    Sensory  Light touch is normal in upper and lower extremities.     Reflexes                                            Right                      Left  Plantar                           Downgoing                Downgoing    Coordination  Right: Finger-to-nose normal.Left: Finger-to-nose normal.    Gait   Normal gait.Casual gait is normal including stance, stride, and arm swing.      Physical Exam  Constitutional:       Appearance: Normal appearance.   HENT:      Head: Normocephalic and atraumatic.   Eyes:      General: Lids are normal.      Extraocular Movements: Extraocular movements intact.      Pupils: Pupils are equal, round, and reactive to light.   Cardiovascular:      Rate and Rhythm: Normal rate and regular rhythm.   Pulmonary:      Effort: Pulmonary effort is normal. No respiratory distress.   Abdominal:      General: There is no distension.      Palpations: Abdomen is soft.   Musculoskeletal:         General: Normal range of motion.      Cervical back: Normal range of motion and neck supple.   Skin:     General: Skin is warm and dry.      Capillary Refill: Capillary refill takes less than 2 seconds.   Neurological:      Mental Status: She is alert and oriented to person, place, and time.      Cranial Nerves: No cranial nerve deficit.      Sensory: No sensory deficit.      Motor: Motor strength is normal. No weakness.      Coordination: Coordination normal.      Gait: Gait normal.   Psychiatric:         Mood and Affect: Mood normal.         Speech: Speech normal.         Behavior: Behavior normal.         Acute Stroke Data    Thrombolytic Inclusion / Exclusion Criteria    Time: 1430 EDT  Person Administering Scale: HUE Lopez    Inclusion Criteria  [x]   18 years of age or greater   []   Onset of symptoms < 4.5 hours before beginning treatment (stroke onset = time patient was last seen well or without symptoms).   []   Diagnosis of acute ischemic stroke causing measurable  disabling deficit (Complete Hemianopia, Any Aphasia, Visual or Sensory Extinction, Any weakness limiting sustained effort against gravity)   []   Any remaining deficit considered potentially disabling in view of patient and practitioner   Exclusion criteria (Do not proceed with Alteplase if any are checked under exclusion criteria)  [x]   Onset unknown or GREATER than 4.5 hours   []   ICH on CT/MRI   []   CT demonstrates hypodensity representing acute or subacute infarct   []   Significant head trauma or prior stroke in the previous 3 months   []   Symptoms suggestive of subarachnoid hemorrhage   []   History of un-ruptured intracranial aneurysm GREATER than 10 mm   []   Recent intracranial or intraspinal surgery within the last 3 months   []   Arterial puncture at a non-compressible site in the previous 7 days   []   Active internal bleeding   []   Acute bleeding tendency   []   Platelet count LESS than 100,000 for known hematological diseases such as leukemia, thrombocytopenia or chronic cirrhosis   []   Current use of anticoagulant with INR GREATER than 1.7 or PT GREATER than 15 seconds, aPTT GREATER than 40 seconds   []   Heparin received within 48 hours, resulting in abnormally elevated aPTT GREATER than upper limit of normal   []   Current use of direct thrombin inhibitors or direct factor Xa inhibitors in the past 48 hours   []   Elevated blood pressure refractory to treatment (systolic GREATER than 185 mm/Hg or diastolic  GREATER than 110 mm/Hg   []   Suspected infective endocarditis and aortic arch dissection   []   Current use of therapeutic treatment dose of low-molecular-weight heparin (LMWH) within the previous 24 hours   []   Structural GI malignancy or bleed   Relative exclusion for all patients  []   Only minor non-disabling symptoms   []   Pregnancy   []   Seizure at onset with postictal residual neurological impairments   []   Major surgery or previous trauma within past 14 days   []   History of  previous spontaneous ICH, intracranial neoplasm, or AV malformation   []   Postpartum (within previous 14 days)   []   Recent GI or urinary tract hemorrhage (within previous 21 days)   []   Recent acute MI (within previous 3 months)   []   History of un-ruptured intracranial aneurysm LESS than 10 mm   []   History of ruptured intracranial aneurysm   []   Blood glucose LESS than 50 mg/dL (2.7 mmol/L)   []   Dural puncture within the last 7 days   []   Known GREATER than 10 cerebral microbleeds   Additional exclusions for patients with symptoms onset between 3 and 4.5 hours.  []   Age > 80.   []   On any anticoagulants regardless of INR  >>> Warfarin (Coumadin), Heparin, Enoxaparin (Lovenox), fondaparinux (Arixtra), bivalirudin (Angiomax), Argatroban, dabigatran (Pradaxa), rivaroxaban (Xarelto), or apixaban (Eliquis)   []   Severe stroke (NIHSS > 25).   []   History of BOTH diabetes and previous ischemic stroke.   []   The risks and benefits have been discussed with the patient or family related to the administration of IV thrombolytic therapy for stroke symptoms.   []   I have discussed and reviewed the patient's case and imaging with the attending prior to IV thrombolytic therapy.    Time IV thrombolytic administered       Hospital Meds:  Scheduled-   Infusions-     PRNs-     Functional Status Prior to Current Stroke/Windsor Score: .  MODIFIED PALOMA SCALE (to be assessed for each patient having history of stroke) []Stroke history but not assessed  []0: No symptoms at all  []1: No significant disability despite symptoms  [x]2: Slight disability  []3: Moderate disability  []4: Moderately severe disability  []5: Severe disability  []6: Death         NIH Stroke Scale  Time: 1430 EDT  Person Administering Scale: Flaca Hutchison, HUE    Interval: baseline  1a. Level of Consciousness: 0-->Alert, keenly responsive  1b. LOC Questions: 0-->Answers both questions correctly  1c. LOC Commands: 0-->Performs both tasks  correctly  2. Best Gaze: 0-->Normal  3. Visual: 0-->No visual loss  4. Facial Palsy: 0-->Normal symmetrical movements  5a. Motor Arm, Left: 0-->No drift, limb holds 90 (or 45) degrees for full 10 secs  5b. Motor Arm, Right: 0-->No drift, limb holds 90 (or 45) degrees for full 10 secs  6a. Motor Leg, Left: 0-->No drift, leg holds 30 degree position for full 5 secs  6b. Motor Leg, Right: 0-->No drift, leg holds 30 degree position for full 5 secs  7. Limb Ataxia: 0-->Absent  8. Sensory: 0-->Normal, no sensory loss  9. Best Language: 0-->No aphasia, normal  10. Dysarthria: 0-->Normal  11. Extinction and Inattention (formerly Neglect): 0-->No abnormality    Total (NIH Stroke Scale): 0      Results Reviewed:  CT Head Without Contrast Stroke Protocol    Result Date: 10/31/2022  IMPRESSION :  1. No acute findings. 2. Mild volume loss secondary to bilateral frontal cortical atrophy. 3. Mild chronic periventricular white matter microvascular ischemia. 4. The results were discussed with the stroke coordinator at 1442 hours.  This report was finalized on 10/31/2022 2:49 PM by Farhat Helms MD.      CT CEREBRAL PERFUSION WITH & WITHOUT CONTRAST    Result Date: 10/31/2022  No CT cerebral perfusion abnormality.  This report was finalized on 10/31/2022 2:54 PM by Farhat Helms MD.          Results for orders placed during the hospital encounter of 06/27/22    Adult Transthoracic Echo Complete W/ Cont if Necessary Per Protocol (With Agitated Saline)    Interpretation Summary  · Saline test results are negative.  · Estimated left ventricular EF = 60% Left ventricular systolic function is normal.       Assessment/Plan:    77 yr old female with a PMH significant for RMCA stroke (6/2022, intermittent difficulty with ambulation), remote tobacco abuse, remote alcohol abuse, and spastic colon who presents to the [facility] ED with c/o worsening left lower extremity weakness and difficulty with ambulation, and word finding difficulty that began this  morning @ 0330.  In addition, she reports that she noted an irregular heartbeat on her blood pressure machine this morning.  Patient initially called the office with symptoms this morning and was referred to the ER for further evaluation.    On arrival to [facility], symptoms resolved back to baseline. She does report intermittent LUE heaviness and intermittent difficulty with ambulation at baseline since her stroke in June.  NIH currently 0.  She is not a candidate for TNKase due to LKW >4.5hrs.    Advanced Imaging:  CTH with no acute findings  MRI pending  MRA H/N pending    Antiplatelet PTA: Aspirin, Plavix  Anticoagulant PTA: None      Worsening left-sided weakness  -Possible stroke recrudescence of previous stroke symptoms  -NIH currently 0  -Continue aspirin, Plavix  -MRI brain pending  -MRA H/N pending  -HLD, high-dose statin.  Lipid panel in the a.m.  -Dizziness; meclizine as needed  -HTN; permissive hypertensive until MRI is reviewed  -P2Y12 in the am  -EKG in normal sinus rhythm.  Patient reported an irregular heartbeat on her home blood pressure machine this morning.  Previous event monitor negative for atrial arrhythmias.  -Up with assistance  -N.p.o. until bedside dysphagia is passed  -PT/OT/SLP  -Plan discussed with Dr. Alonzo, the patient, Dr. Keita, and nursing staff.  Stroke neurology will continue to follow.  Call with any questions or concerns.    HUE Lopez, AGACNP-BC  October 31, 2022  14:43 EDT

## 2022-10-31 NOTE — ED PROVIDER NOTES
[place]    EMERGENCY DEPARTMENT ENCOUNTER      Pt Name: Alysia Aguayo  MRN: [id number]  YOB: 1945  Date of evaluation: 10/31/2022  Provider: Gilbert Alonzo DO    CHIEF COMPLAINT       Chief Complaint   Patient presents with   • Stroke         HISTORY OF PRESENT ILLNESS  (Location/Symptom, Timing/Onset, Context/Setting, Quality, Duration, Modifying Factors, Severity.)   Alysia Aguayo is a 77 y.o. female who presents to the emergency department for evaluation of left-sided weakness which she started noticing potentially yesterday evening, early this morning when she awoke around 9 AM.  She noted her left leg was heavier than usual, was having some difficulty using her left arm on some word finding difficulty as well.  Last known well was about 330 this morning when she got up to use the bathroom.  She has a history of prior CVA with right MCA stroke in June 2022 with some amatory difficulty at that time.  She is on aspirin, no other blood thinning medications.  She denies any fall, injury or head trauma, no chest pain or palpitations, she denies any current symptoms that she believes her underlying symptoms are resolving.  She denies any other acute symptom complaints at this time.      Nursing notes were reviewed.    REVIEW OF SYSTEMS    (2-9 systems for level 4, 10 or more for level 5)   ROS:  General:  No fevers, no chills, no weakness  Cardiovascular:  No chest pain, no palpitations  Respiratory:  No shortness of breath, no cough, no wheezing  Gastrointestinal:  No pain, no nausea, no vomiting, no diarrhea  Musculoskeletal:  No muscle pain, no joint pain  Skin:  No rash  Neurologic:  + speech problems, no headache, positive left leg heaviness, weakness  Psychiatric:  No anxiety  Genitourinary:  No dysuria, no hematuria    Except as noted above the remainder of the review of systems was reviewed and negative.       PAST MEDICAL HISTORY     Past Medical History:   Diagnosis Date   •  Arthritis    • Back pain    • Carotid artery disease (HCC)    • Coronary artery disease    • Depression    • GERD (gastroesophageal reflux disease)    • History of alcoholism (HCC)    • Hyperlipidemia    • Hypertension    • Kidney disease     was stage 4 but taking excessive amounts of ibuprofen, f/u with nephrologist at the time, resolved since and was to see nephrologist as needed    • Neck pain    • Spastic colon    • Spinal stenosis    • Stroke (HCC) 2022   • Wears dentures    • Wears reading eyeglasses          SURGICAL HISTORY       Past Surgical History:   Procedure Laterality Date   • CARDIAC CATHETERIZATION  2010    x2    • CATARACT EXTRACTION Bilateral    • CHOLECYSTECTOMY     • COLONOSCOPY  2016   • CORONARY STENT PLACEMENT  2010    x3    • ESOPHAGEAL DILATATION     • HYSTERECTOMY     • LUMBAR DISCECTOMY  07/10/2014    Rt- L1/2 Dr. Dionicio Pollard    • LUMBAR LAMINECTOMY DISCECTOMY DECOMPRESSION N/A 10/17/2019    Procedure: LUMBAR LAMINECTOMY L3-5;  Surgeon: Dionicio Pollard MD;  Location: [facility];  Service: Neurosurgery   • TUBAL ABDOMINAL LIGATION     • UPPER GASTROINTESTINAL ENDOSCOPY           CURRENT MEDICATIONS       Current Facility-Administered Medications:   •  sodium chloride 0.9 % flush 10 mL, 10 mL, Intravenous, PRN, Gilbert Alonzo, DO    Current Outpatient Medications:   •  acetaminophen (TYLENOL) 325 MG tablet, Take 650 mg by mouth Every 4 (Four) Hours As Needed for Mild Pain  or Moderate Pain ., Disp: , Rfl:   •  amLODIPine (NORVASC) 5 MG tablet, Take 2.5 mg by mouth 2 (Two) Times a Day., Disp: , Rfl:   •  aspirin 81 MG chewable tablet, Chew 81 mg Daily., Disp: , Rfl:   •  atorvastatin (LIPITOR) 80 MG tablet, Take 1 tablet by mouth Every Night., Disp: 90 tablet, Rfl: 1  •  clopidogrel (PLAVIX) 75 MG tablet, Take 1 tablet by mouth Daily., Disp: 30 tablet, Rfl: 1  •  Diclofenac Sodium (VOLTAREN) 1 % gel gel, Apply 4 g topically to the appropriate area as directed 4 (Four) Times a Day  As Needed (pain)., Disp: 4 g, Rfl: 5  •  Esomeprazole Magnesium (NEXIUM PO), Take 75 mg by mouth Before Breakfast., Disp: , Rfl:   •  FLUoxetine (PROzac) 40 MG capsule, Take 1 capsule by mouth Daily., Disp: 90 capsule, Rfl: 1  •  glycopyrrolate (ROBINUL) 1 MG tablet, Take 1 mg by mouth 2 (Two) Times a Day., Disp: , Rfl:   •  meclizine (ANTIVERT) 25 MG tablet, Take 1 tablet by mouth 3 (Three) Times a Day As Needed for Dizziness., Disp: 90 tablet, Rfl: 1    ALLERGIES     Penicillins, Codeine, and Imodium a-d [loperamide hcl]    FAMILY HISTORY       Family History   Problem Relation Age of Onset   • Heart disease Mother    • Emphysema Mother    • Coronary artery disease Mother    • Cancer Father    • Lung cancer Father    • Colon cancer Neg Hx           SOCIAL HISTORY       Social History     Socioeconomic History   • Marital status:    Tobacco Use   • Smoking status: Former     Packs/day: 1.50     Years: 40.00     Pack years: 60.00     Types: Cigarettes     Quit date: 2000     Years since quittin.8   • Smokeless tobacco: Never   Vaping Use   • Vaping Use: Never used   Substance and Sexual Activity   • Alcohol use: No     Comment: hasn't used since , hx of alcoholism   • Drug use: No   • Sexual activity: Defer         PHYSICAL EXAM    (up to 7 for level 4, 8 or more for level 5)     Vitals:    10/31/22 1453 10/31/22 1455 10/31/22 1459 10/31/22 1529   BP:   91/58 90/64   Patient Position:       Pulse:  74 67 70   Resp:       Temp:       TempSrc:       SpO2: 100%  100% 100%   Weight:       Height:           Physical Exam  General : Patient is awake, alert, oriented, in no acute distress, nontoxic appearing  HEENT: Pupils are equally round, EOMI, conjunctivae clear, there is no injection no icterus.  Oral mucosa is moist, uvula midline  Neck: Neck is supple, full range of motion, trachea midline  Cardiac: Heart regular rate, rhythm, no murmurs, rubs, or gallops  Lungs: Lungs are clear to auscultation,  there is no wheezing, rhonchi, or rales. There is no use of accessory muscles  Abdomen: Abdomen is soft, nontender, nondistended. There are no firm or pulsatile masses, no rebound rigidity or guarding  Musculoskeletal: 5 out of 5 strength in all 4 extremities.  No focal muscle deficits are appreciated  Neuro: Motor intact, sensory intact, level of consciousness is normal, there is no focal neurological deficit examination, 5-5 strength bilateral upper and lower extremities, normal finger-to-nose, no dysdiadochokinesia, patient without any difficulty with gait/a other disturbance.  Please refer to stroke navigator for full NIH score.  Dermatology: Skin is warm and dry  Psych: Mentation is grossly normal, cognition is grossly normal. Affect is appropriate      DIAGNOSTIC RESULTS     EKG: All EKGs are interpreted by the Emergency Department Physician who either signs or Co-signs this chart in the absence of a cardiologist.    ECG 12 Lead ED Triage Standing Order; Acute Stroke (Onset <24 hrs)   Preliminary Result   Test Reason : ED Triage Standing Order~   Blood Pressure :   */*   mmHG   Vent. Rate :  65 BPM     Atrial Rate :  65 BPM      P-R Int : 154 ms          QRS Dur :  86 ms       QT Int : 400 ms       P-R-T Axes :  51  14  51 degrees      QTc Int : 416 ms      Normal sinus rhythm   Normal ECG   When compared with ECG of 27-JUN-2022 21:32,   Nonspecific T wave abnormality no longer evident in Lateral leads      Referred By: EDMD           Confirmed By:           RADIOLOGY:   Non-plain film images such as CT, Ultrasound and MRI are read by the radiologist. Plain radiographic images are visualized and preliminarily interpreted by the emergency physician with the below findings:    MRI Brain Without Contrast [[id number]] (Order [id number])  Order  Status: Final result     Patient Location    Patient Class Location   Emergency [facility] EMERGENCY DEPT, 17, 17     [phone]     Study Notes       Araceli Osborne on  6/28/2022  5:15 AM EDT   Numbness; Stroke     Appointment Information    PACS Images     Radiology Images  Study Result    Narrative & Impression   Examination: MRI BRAIN WO CONTRAST-     Date of Exam: 6/28/2022 5:14 AM     Indication: Stroke, follow up; R29.90-Unspecified symptoms and signs  involving the nervous system; R20.0-Anesthesia of skin.     Comparison: Head and neck CTA and perfusion 6/27/2022.     Technique: Standard non-contrast MR pulse sequences of the brain were  obtained.     Findings:  There are multiple punctate foci of diffusion restriction within the  right MCA territory including the insular cortex, frontal and parietal  lobes. There is a solitary focus in the right frontal white matter. No  mass effect or midline shift. There are several small rounded and  punctate foci of FLAIR hyperintense signal within the cerebral white  matter. There is no evidence of acute or chronic intracranial  hemorrhage. No mass effect or midline shift. No abnormal extra-axial  collections.  The major vascular flow voids appear intact. The basal  ganglia, brainstem and cerebellum appear within normal limits.   Calvarial and superficial soft tissue signal is within normal limits.   There is thinning of the orbital lenses bilaterally suggesting prior  cataract surgery or cataracts. There is mild ethmoid sinus mucosal  disease. Mastoid air cells appear well aerated. The paranasal sinuses  and the mastoid air cells appear well aerated.  Midline structures are  intact.      IMPRESSION:     1. Scattered small acute/subacute infarcts in the right MCA territory  clustered in the posterior insular region. No hemorrhage.  2. Mild underlying chronic small vessel ischemic change.  3. Mild ethmoid sinus mucosal disease.     This report was finalized on 6/28/2022 8:26 AM by Lex Zhu MD.       [] Radiologist's Report Reviewed:  CT CEREBRAL PERFUSION WITH & WITHOUT CONTRAST   Final Result   No CT cerebral perfusion  abnormality.       This report was finalized on 10/31/2022 2:54 PM by Farhat Helms MD.          CT Head Without Contrast Stroke Protocol   Final Result   IMPRESSION :       1. No acute findings.   2. Mild volume loss secondary to bilateral frontal cortical atrophy.   3. Mild chronic periventricular white matter microvascular ischemia.   4. The results were discussed with the stroke coordinator at 1442 hours.       This report was finalized on 10/31/2022 2:49 PM by Farhat Helms MD.          XR Chest 1 View    (Results Pending)         ED BEDSIDE ULTRASOUND:   Performed by ED Physician - none    LABS:    I have reviewed and interpreted all of the currently available lab results from this visit (if applicable):  Results for orders placed or performed during the hospital encounter of 10/31/22   Troponin    Specimen: Blood   Result Value Ref Range    Troponin T <0.010 0.000 - 0.030 ng/mL   aPTT    Specimen: Blood   Result Value Ref Range    PTT 28.9 22.0 - 39.0 seconds   AST    Specimen: Blood   Result Value Ref Range    AST (SGOT) 21 1 - 32 U/L   ALT    Specimen: Blood   Result Value Ref Range    ALT (SGPT) 14 1 - 33 U/L   CBC Auto Differential    Specimen: Blood   Result Value Ref Range    WBC 8.57 3.40 - 10.80 10*3/mm3    RBC 4.12 3.77 - 5.28 10*6/mm3    Hemoglobin 13.0 12.0 - 15.9 g/dL    Hematocrit 41.8 34.0 - 46.6 %    .5 (H) 79.0 - 97.0 fL    MCH 31.6 26.6 - 33.0 pg    MCHC 31.1 (L) 31.5 - 35.7 g/dL    RDW 13.7 12.3 - 15.4 %    RDW-SD 51.1 37.0 - 54.0 fl    MPV 9.3 6.0 - 12.0 fL    Platelets 253 140 - 450 10*3/mm3    Neutrophil % 65.0 42.7 - 76.0 %    Lymphocyte % 24.9 19.6 - 45.3 %    Monocyte % 7.9 5.0 - 12.0 %    Eosinophil % 1.4 0.3 - 6.2 %    Basophil % 0.6 0.0 - 1.5 %    Immature Grans % 0.2 0.0 - 0.5 %    Neutrophils, Absolute 5.57 1.70 - 7.00 10*3/mm3    Lymphocytes, Absolute 2.13 0.70 - 3.10 10*3/mm3    Monocytes, Absolute 0.68 0.10 - 0.90 10*3/mm3    Eosinophils, Absolute 0.12 0.00 - 0.40 10*3/mm3     Basophils, Absolute 0.05 0.00 - 0.20 10*3/mm3    Immature Grans, Absolute 0.02 0.00 - 0.05 10*3/mm3    nRBC 0.0 0.0 - 0.2 /100 WBC   POC CHEM 8    Specimen: Arterial Blood   Result Value Ref Range    Glucose 77 70 - 130 mg/dL    BUN 26 8 - 26 mg/dL    Creatinine 1.20 0.60 - 1.30 mg/dL    Sodium 139 138 - 146 mmol/L    POC Potassium 3.9 3.5 - 4.9 mmol/L    Chloride 106 98 - 109 mmol/L    Total CO2 21 (L) 24 - 29 mmol/L    Hemoglobin 14.3 12.0 - 17.0 g/dL    Hematocrit 42 38 - 51 %    Ionized Calcium 1.31 1.20 - 1.32 mmol/L    eGFR 47.0 (L) >60.0 mL/min/1.73   POC Protime / INR    Specimen: Blood   Result Value Ref Range    Protime 12.8 12.8 - 15.2 seconds    INR 1.1 0.8 - 1.2   ECG 12 Lead ED Triage Standing Order; Acute Stroke (Onset <24 hrs)   Result Value Ref Range    QT Interval 400 ms    QTC Interval 416 ms        All other labs were within normal range or not returned as of this dictation.      EMERGENCY DEPARTMENT COURSE and DIFFERENTIAL DIAGNOSIS/MDM:   Vitals:    Vitals:    10/31/22 1453 10/31/22 1455 10/31/22 1459 10/31/22 1529   BP:   91/58 90/64   Patient Position:       Pulse:  74 67 70   Resp:       Temp:       TempSrc:       SpO2: 100%  100% 100%   Weight:       Height:                Patient with a history of a right MCA infarct in June of this year who presents with crescendo decrescendo symptoms with left arm weakness, left leg weakness, difficulty with speech.  Her symptoms have resolved, she is returned to baseline upon arrival to the emergency department.  She is seen by myself and stroke navigator to the emergency department.  NIH of 0 on our initial evaluation.  CT of the head with no acute abnormality.  We will proceed forward with further neurological work-up MRI/ MRA.  Case discussed with hospitalist team, Dr. Mills, for admission.    MEDICATIONS ADMINISTERED IN ED:  Medications   sodium chloride 0.9 % flush 10 mL (has no administration in time range)   iopamidol (ISOVUE-370) 76 %  injection 115 mL (115 mL Intravenous Given 10/31/22 1444)   iopamidol (ISOVUE-370) 76 % injection 40 mL (40 mL Intravenous Given 10/31/22 1448)       PROCEDURES:  Procedures    CRITICAL CARE TIME    Total Critical Care time was 30 minutes, excluding separately reportable procedures.  Acute stroke, neurological deficit, left leg weakness, difficulty with speech requiring multiple neurochecks, reevaluations, neurological consultations. There was a high probability of clinically significant/life threatening deterioration in the patient's condition which required my urgent intervention.      FINAL IMPRESSION      1. Cerebrovascular accident (CVA), unspecified mechanism (HCC)    2. Left leg weakness          DISPOSITION/PLAN     ED Disposition     ED Disposition   Decision to Admit    Condition   --    Comment   --                   Comment: Please note this report has been produced using speech recognition software.      Gilbert Alonzo DO  Attending Emergency Physician               Gilbert Alonzo,   10/31/22 [id number]

## 2022-10-31 NOTE — Clinical Note
Hemostasis started on the right femoral artery. Angio-Seal was used in achieving hemostasis. Closure device deployed in the vessel. Hemostasis achieved successfully.

## 2022-10-31 NOTE — Clinical Note
Level of Care: Telemetry [5]   Diagnosis: Cerebrovascular accident (CVA), unspecified mechanism (HCC) [[id number]]

## 2022-10-31 NOTE — TELEPHONE ENCOUNTER
Spoke to patient. Ms. Aguayo called and stated that since last week she has had episodes of a head ache with dizziness and weakness in arms and legs and word difficulty.  She said her BP is 144/88 and her heart rate 95.  She said her machine says she had an irregular heartbeat. She said she tried to tell her daughter that she felt weird and couldn't get the word out correctly.  She takes a 325mg aspirin daily and 80mg Lipitor. I told her that I think it would be better if she came in to be looked at.  She is on her way to the ED now.

## 2022-10-31 NOTE — H&P
[facility] Medicine Services  HISTORY AND PHYSICAL    Patient Name: Alysia Aguayo  : 1945  MRN: [id number]  Primary Care Physician: Yesica Zhu PA-C  Date of admission: 10/31/2022      Subjective   Subjective     Chief Complaint:  Transient lightheadedness with left side weakness    HPI:  Alysia Aguayo is a 77 y.o. female former smoker with a PMH of CVA 2022 ([facility]) who at baseline remains active and ambulatory.  She comes to [facility] ED due to LLE with c/o a spell of lightheadedness along with left lower extremity weakness and word finding difficulty.  She noted it this morning at 0330.  On arrival to [facility] ED, she says her symptoms are resolving.  Initial NIH is 0.   She has had similar spells like this in the recent past, associated with lightheadedness as well.  She has also been told by her BP machine that she has an irregular heart rate.      Currently she feels fine.  States she has been on lisinopril and amlodipine for a long time and her BP usually runs 120s-140s.  She is not aware of it going low.  She does get a 'full feeling in the chest' during these episodes of lightheadedness/weakness      Review of Systems   Gen- No fevers, chills  CV- No chest pain, no histoyr of arryhtmia, sees dr zheng  Resp- No cough, dyspnea  GI- No N/V/D, abd pain, recently eating well  Neuro - walks without difficulty but has some balance problems at times      All other systems reviewed and are negative.     Personal History     Past Medical History:   Diagnosis Date   • Arthritis    • Back pain    • Carotid artery disease (HCC)    • Coronary artery disease    • Depression    • GERD (gastroesophageal reflux disease)    • History of alcoholism ([facility])    • Hyperlipidemia    • Hypertension    • Kidney disease     was stage 4 but taking excessive amounts of ibuprofen, f/u with nephrologist at the time, resolved since and was to see nephrologist as needed    • Neck pain    • Spastic  colon    • Spinal stenosis    • Stroke (HCC) 2022   • Wears dentures    • Wears reading eyeglasses              Past Surgical History:   Procedure Laterality Date   • CARDIAC CATHETERIZATION  2010    x2    • CATARACT EXTRACTION Bilateral    • CHOLECYSTECTOMY     • COLONOSCOPY  2016   • CORONARY STENT PLACEMENT  2010    x3    • ESOPHAGEAL DILATATION     • HYSTERECTOMY     • LUMBAR DISCECTOMY  07/10/2014    Rt- L1/2 Dr. Dionicio Pollard    • LUMBAR LAMINECTOMY DISCECTOMY DECOMPRESSION N/A 10/17/2019    Procedure: LUMBAR LAMINECTOMY L3-5;  Surgeon: Dionicio Pollard MD;  Location: [facility];  Service: Neurosurgery   • TUBAL ABDOMINAL LIGATION     • UPPER GASTROINTESTINAL ENDOSCOPY         Family History: *family history includes Cancer in her father; Coronary artery disease in her mother; Emphysema in her mother; Heart disease in her mother; Lung cancer in her father. Otherwise pertinent FHx was reviewed and unremarkable.     Social History:  reports that she quit smoking about 22 years ago. Her smoking use included cigarettes. She has a 60.00 pack-year smoking history. She has never used smokeless tobacco. She reports that she does not drink alcohol and does not use drugs.  Social History     Social History Narrative   • Not on file       Medications:  Available home medication information reviewed.  Medications Prior to Admission   Medication Sig Dispense Refill Last Dose   • acetaminophen (TYLENOL) 325 MG tablet Take 650 mg by mouth Every 4 (Four) Hours As Needed for Mild Pain  or Moderate Pain .      • amLODIPine (NORVASC) 5 MG tablet Take 2.5 mg by mouth 2 (Two) Times a Day.      • aspirin 325 MG tablet Take 1 tablet by mouth Daily.      • aspirin 81 MG chewable tablet Chew 81 mg Daily. (Patient not taking: Reported on 10/31/2022)   Not Taking   • atorvastatin (LIPITOR) 80 MG tablet Take 1 tablet by mouth Every Night. 90 tablet 1    • clopidogrel (PLAVIX) 75 MG tablet Take 1 tablet by mouth Daily. (Patient not  taking: Reported on 10/31/2022) 30 tablet 1 Not Taking   • Diclofenac Sodium (VOLTAREN) 1 % gel gel Apply 4 g topically to the appropriate area as directed 4 (Four) Times a Day As Needed (pain). 4 g 5    • Esomeprazole Magnesium (NEXIUM PO) Take 75 mg by mouth Before Breakfast.      • FLUoxetine (PROzac) 40 MG capsule Take 1 capsule by mouth Daily. 90 capsule 1    • glycopyrrolate (ROBINUL) 1 MG tablet Take 1 mg by mouth 2 (Two) Times a Day.      • meclizine (ANTIVERT) 25 MG tablet Take 1 tablet by mouth 3 (Three) Times a Day As Needed for Dizziness. 90 tablet 1    • meclizine (Dramamine) 25 MG tablet Take 12.5 mg by mouth 3 (Three) Times a Day As Needed for Dizziness.          Allergies   Allergen Reactions   • Penicillins Hives   • Codeine Nausea And Vomiting   • Imodium A-D [Loperamide Hcl] Nausea And Vomiting       Objective   Objective     Vital Signs:   Temp:  [97.8 °F (36.6 °C)] 97.8 °F (36.6 °C)  Heart Rate:  [64-75] 64  Resp:  [18] 18  BP: ()/(58-71) 97/62  Total (NIH Stroke Scale): 0    Physical Exam   Constitutional: Awake, alert in bed NAD, dtr present  Eyes: PER, sclerae anicteric, no conjunctival injection  HENT: NCAT, mucous membranes moist  Neck: Supple, trachea midline  Respiratory: Clear to auscultation bilaterally, nonlabored respirations   Cardiovascular: RRR, no murmurs, rubs, or gallops,  Gastrointestinal: Positive bowel sounds, soft, nontender, nondistended  Musculoskeletal: No bilateral ankle edema, no clubbing or cyanosis to extremities  Psychiatric: Appropriate affect, cooperative  Neurologic: Oriented x 3, strength symmetric in all extremities, Cranial Nerves grossly intact to confrontation, speech clear  Skin: No rashes       Result Review:  I have personally reviewed the results from the time of this admission to 10/31/2022 17:43 EDT and agree with these findings:  [x]  Laboratory list / accordion  []  Microbiology  [x]  Radiology  [x]  EKG/Telemetry   []  Cardiology/Vascular   []   Pathology  [x]  Old records  []  Other:  Most notable findings include: head CT no acute findings. Cr 1.2.  EKG NSR        LAB RESULTS:      Lab 10/31/22  1449 10/31/22  1445   WBC  --  8.57   HEMOGLOBIN  --  13.0   HEMOGLOBIN, POC  --  14.3   HEMATOCRIT  --  41.8   HEMATOCRIT POC  --  42   PLATELETS  --  253   NEUTROS ABS  --  5.57   IMMATURE GRANS (ABS)  --  0.02   LYMPHS ABS  --  2.13   MONOS ABS  --  0.68   EOS ABS  --  0.12   MCV  --  101.5*   PROTIME 12.8  --    INR 1.1  --    APTT  --  28.9         Lab 10/31/22  1445   CREATININE 1.20   EGFR 47.0*         Lab 10/31/22  1445   ALT (SGPT) 14   AST (SGOT) 21         Lab 10/31/22  1445   TROPONIN T <0.010                     Microbiology Results (last 10 days)     ** No results found for the last 240 hours. **          XR Chest 1 View    Result Date: 10/31/2022  DATE OF EXAM: 10/31/2022 3:09 PM  PROCEDURE: XR CHEST 1 VW-  INDICATIONS: Acute cerebrovascular accident.  COMPARISON: 6/27/2022.  TECHNIQUE: Single radiographic view of the chest was obtained.  FINDINGS: The heart size is normal. The pulmonary vascular markings are normal. The lungs and pleural spaces are clear of active disease.  There are chronic age-related changes involving the bony thorax and thoracic aorta.      Impression: No active disease.  This report was finalized on 10/31/2022 4:13 PM by Farhat Helms MD.      CT Head Without Contrast Stroke Protocol    Result Date: 10/31/2022   DATE OF EXAM: 10/31/2022 2:39 PM  PROCEDURE: CT HEAD WO CONTRAST STROKE PROTOCOL-  INDICATIONS: Speech difficulty, left leg heaviness.  COMPARISON: 6/27/2022.  TECHNIQUE: Routine transaxial cuts were obtained through the head without the administration of contrast. Automated exposure control and iterative reconstruction methods were used.  FINDINGS: There is mild volume loss with prominence of the fissures in the sulci of the frontal lobes. The ventricles and basal cisterns are well-maintained. There are subtle areas of  decreased density in the periventricular white matter felt to represent chronic microvascular ischemia. There is no acute hemorrhage, midline shift, or suspicious extra-axial fluid collections. There is thinning of the lenses suggestive of previous cataract surgery. The visualized paranasal and mastoid sinuses are clear. The calvarium is unremarkable.      Impression: IMPRESSION :  1. No acute findings. 2. Mild volume loss secondary to bilateral frontal cortical atrophy. 3. Mild chronic periventricular white matter microvascular ischemia. 4. The results were discussed with the stroke coordinator at 1442 hours.  This report was finalized on 10/31/2022 2:49 PM by Farhat Helms MD.      CT CEREBRAL PERFUSION WITH & WITHOUT CONTRAST    Result Date: 10/31/2022  DATE OF EXAM: 10/31/2022 2:42 PM  PROCEDURE: CT CEREBRAL PERFUSION W WO CONTRAST-  INDICATIONS: Acute neurologic deficit, slurred speech, left leg heaviness.  COMPARISON: No comparisons available.  TECHNIQUE: Routine transaxial cuts were obtained through the head without administration of contrast. Routine transaxial cuts were then obtained through the head following the intravenous administration of 40 mL of Isovue 370. Core blood volume, core blood flow, mean transit time, and Tmax images were obtained utilizing the Rapid software protocol. A limited CT angiogram of the head was also performed to measure the blood vessel density.  The radiation dose reduction device was turned on for each scan per the ALARA (As Low as Reasonably Achievable) protocol.  FINDINGS: There is no ischemic core. There is no ischemic penumbra. The mismatch volume is 0 cc. The hypoperfusion index is not applicable.      Impression: No CT cerebral perfusion abnormality.  This report was finalized on 10/31/2022 2:54 PM by Farhat Helms MD.        Results for orders placed during the hospital encounter of 06/27/22    Adult Transthoracic Echo Complete W/ Cont if Necessary Per Protocol (With  Agitated Saline)    Interpretation Summary  · Saline test results are negative.  · Estimated left ventricular EF = 60% Left ventricular systolic function is normal.      Assessment & Plan   Assessment & Plan     Active Hospital Problems    Diagnosis  POA   • **Cerebrovascular accident (CVA), unspecified mechanism (HCC) [I63.9]  Yes     77 yr old woman with history of R MCA CVA in 6/2022 and intermittent L side weakness since then, comes to ED after noticing L side weakness and trouble getting words out.  Back to baseline by time of arrival.    L side weakness/expr aphasia, transient  - consider new CVA vs recrudescent symptoms assoc with intermittent hypotension   - BP fell to 97/62 in ED is now 120 systolic; check orthostatics tomorrow   - stroke team following; await MRI/MRA   - ASA Plavix and high intensity statin   - P2Y12 pending  - PT OT ST     HTN  - states she takes amlodipine and lisinopril 40mg x a long time   - monitor    Reported irreg beats seen on home BP machine  - NSR here  - consider MCOT at discharge    CKD  - creat 1.2, baseline.      Macrocytosis  - check B12 and folate       DVT prophylaxis:  lovenox       CODE STATUS:  Full   There are no questions and answers to display.         Ivonne Mills MD  10/31/22

## 2022-10-31 NOTE — Clinical Note
Allergies reviewed.  H&P note has been confirmed for the patient. Procedural consent has been signed.

## 2022-10-31 NOTE — TELEPHONE ENCOUNTER
0745 Bedside, Verbal and Written shift change report given to JAGDISH Cerna RN (oncoming nurse) by Wendy Razo. Ирина Reyes RN (offgoing nurse). Report included the following information SBAR, Kardex, Intake/Output, MAR, Accordion, Recent Results and Med Rec Status. 1325 Bedside, Verbal and Written shift change report given to Marisela Hsu RN (oncoming nurse) by JAGDISH Cerna RN (offgoing nurse). Report included the following information SBAR, Intake/Output, MAR, Accordion, Recent Results and Med Rec Status. Caller: PADMA Fregoso call back number:[phone]/DAUGHTER JUSTIN [phone]    What is the best time to reach you: ANY     What was the call regarding: PT CALLED WITH REF FLAG SIGNS LEGS WEAK, DIZZY, HEADACHE , LOSS OF BALANCE, TRIED TO CALL OFFICE NO ANSWER , ADVISED PT TO GO TO E.R THOSE ARE RED FLAGS FOR STROKE, SHE IS HEADED TO [facility].     Do you require a callback:  YES     PLEASE ADVISE.

## 2022-11-01 ENCOUNTER — APPOINTMENT (OUTPATIENT)
Dept: CARDIOLOGY | Facility: HOSPITAL | Age: 77
DRG: 254 | End: 2022-11-01
Payer: MEDICARE

## 2022-11-01 LAB
BH CV XLRA MEAS LEFT DIST CCA EDV: 23.5 CM/SEC
BH CV XLRA MEAS LEFT DIST CCA PSV: 115 CM/SEC
BH CV XLRA MEAS LEFT DIST ICA EDV: 45.5 CM/SEC
BH CV XLRA MEAS LEFT DIST ICA PSV: 240 CM/SEC
BH CV XLRA MEAS LEFT ICA/CCA RATIO: 0.94
BH CV XLRA MEAS LEFT MID CCA EDV: 18 CM/SEC
BH CV XLRA MEAS LEFT MID CCA PSV: 103 CM/SEC
BH CV XLRA MEAS LEFT MID ICA EDV: 41.5 CM/SEC
BH CV XLRA MEAS LEFT MID ICA PSV: 201 CM/SEC
BH CV XLRA MEAS LEFT PROX CCA EDV: 15.7 CM/SEC
BH CV XLRA MEAS LEFT PROX CCA PSV: 96.4 CM/SEC
BH CV XLRA MEAS LEFT PROX ECA PSV: 107 CM/SEC
BH CV XLRA MEAS LEFT PROX ICA EDV: 27.4 CM/SEC
BH CV XLRA MEAS LEFT PROX ICA PSV: 97.2 CM/SEC
BH CV XLRA MEAS LEFT PROX SCLA PSV: 251 CM/SEC
BH CV XLRA MEAS LEFT VERTEBRAL A PSV: 140 CM/SEC
BH CV XLRA MEAS RIGHT DIST CCA EDV: 29.2 CM/SEC
BH CV XLRA MEAS RIGHT DIST CCA PSV: 57.8 CM/SEC
BH CV XLRA MEAS RIGHT DIST ICA EDV: 51.6 CM/SEC
BH CV XLRA MEAS RIGHT DIST ICA PSV: 90.7 CM/SEC
BH CV XLRA MEAS RIGHT ICA/CCA RATIO: 0.91
BH CV XLRA MEAS RIGHT MID CCA EDV: 26.7 CM/SEC
BH CV XLRA MEAS RIGHT MID CCA PSV: 62.1 CM/SEC
BH CV XLRA MEAS RIGHT MID ICA EDV: 25.5 CM/SEC
BH CV XLRA MEAS RIGHT MID ICA PSV: 56.5 CM/SEC
BH CV XLRA MEAS RIGHT PROX CCA EDV: 24.4 CM/SEC
BH CV XLRA MEAS RIGHT PROX CCA PSV: 121 CM/SEC
BH CV XLRA MEAS RIGHT PROX ECA PSV: 68.3 CM/SEC
BH CV XLRA MEAS RIGHT PROX ICA EDV: 23 CM/SEC
BH CV XLRA MEAS RIGHT PROX ICA PSV: 51.6 CM/SEC
BH CV XLRA MEAS RIGHT PROX SCLA PSV: 158 CM/SEC
BH CV XLRA MEAS RIGHT VERTEBRAL A PSV: 103 CM/SEC
CHOLEST SERPL-MCNC: 133 MG/DL (ref 0–200)
FOLATE SERPL-MCNC: 8.73 NG/ML (ref 4.78–24.2)
GLUCOSE BLDC GLUCOMTR-MCNC: 86 MG/DL (ref 70–130)
HBA1C MFR BLD: 5.2 % (ref 4.8–5.6)
HDLC SERPL-MCNC: 56 MG/DL (ref 40–60)
LDLC SERPL CALC-MCNC: 59 MG/DL (ref 0–100)
LDLC/HDLC SERPL: 1.03 {RATIO}
MAXIMAL PREDICTED HEART RATE: 144 BPM
RIGHT ARM BP: NORMAL MMHG
STRESS TARGET HR: 122 BPM
TRIGL SERPL-MCNC: 96 MG/DL (ref 0–150)
VIT B12 BLD-MCNC: 385 PG/ML (ref 211–946)
VLDLC SERPL-MCNC: 18 MG/DL (ref 5–40)

## 2022-11-01 PROCEDURE — 80061 LIPID PANEL: CPT | Performed by: NURSE PRACTITIONER

## 2022-11-01 PROCEDURE — G0378 HOSPITAL OBSERVATION PER HR: HCPCS

## 2022-11-01 PROCEDURE — 99232 SBSQ HOSP IP/OBS MODERATE 35: CPT | Performed by: NURSE PRACTITIONER

## 2022-11-01 PROCEDURE — 97161 PT EVAL LOW COMPLEX 20 MIN: CPT

## 2022-11-01 PROCEDURE — 97535 SELF CARE MNGMENT TRAINING: CPT

## 2022-11-01 PROCEDURE — 83036 HEMOGLOBIN GLYCOSYLATED A1C: CPT | Performed by: NURSE PRACTITIONER

## 2022-11-01 PROCEDURE — 97165 OT EVAL LOW COMPLEX 30 MIN: CPT

## 2022-11-01 PROCEDURE — 99221 1ST HOSP IP/OBS SF/LOW 40: CPT

## 2022-11-01 PROCEDURE — 82607 VITAMIN B-12: CPT | Performed by: INTERNAL MEDICINE

## 2022-11-01 PROCEDURE — 82746 ASSAY OF FOLIC ACID SERUM: CPT | Performed by: INTERNAL MEDICINE

## 2022-11-01 PROCEDURE — 82962 GLUCOSE BLOOD TEST: CPT

## 2022-11-01 PROCEDURE — 99239 HOSP IP/OBS DSCHRG MGMT >30: CPT | Performed by: INTERNAL MEDICINE

## 2022-11-01 PROCEDURE — 93880 EXTRACRANIAL BILAT STUDY: CPT

## 2022-11-01 PROCEDURE — 25010000002 ENOXAPARIN PER 10 MG: Performed by: INTERNAL MEDICINE

## 2022-11-01 PROCEDURE — 92523 SPEECH SOUND LANG COMPREHEN: CPT

## 2022-11-01 PROCEDURE — 97530 THERAPEUTIC ACTIVITIES: CPT

## 2022-11-01 RX ORDER — ACETAMINOPHEN 325 MG/1
650 TABLET ORAL EVERY 6 HOURS PRN
Status: DISCONTINUED | OUTPATIENT
Start: 2022-11-01 | End: 2022-11-04 | Stop reason: HOSPADM

## 2022-11-01 RX ADMIN — CLOPIDOGREL BISULFATE 75 MG: 75 TABLET ORAL at 08:42

## 2022-11-01 RX ADMIN — ACETAMINOPHEN 650 MG: 325 TABLET, FILM COATED ORAL at 11:00

## 2022-11-01 RX ADMIN — Medication 10 ML: at 22:06

## 2022-11-01 RX ADMIN — ATORVASTATIN CALCIUM 80 MG: 40 TABLET, FILM COATED ORAL at 22:06

## 2022-11-01 RX ADMIN — ENOXAPARIN SODIUM 40 MG: 40 INJECTION SUBCUTANEOUS at 08:42

## 2022-11-01 RX ADMIN — ASPIRIN 81 MG CHEWABLE TABLET 81 MG: 81 TABLET CHEWABLE at 08:42

## 2022-11-01 RX ADMIN — FLUOXETINE 40 MG: 20 CAPSULE ORAL at 22:06

## 2022-11-01 NOTE — PLAN OF CARE
Goal Outcome Evaluation:  Plan of Care Reviewed With: patient, daughter        Progress: improving  Outcome Evaluation: PT eval completed. Patient pleasant and motivated. She presents w/ good/symmetrical LE strength and appears near baseline mobility. She completed bed mobility and transfers independently and ambulated 55ft completing dynamic gait activities w/ no LOB or instability. No further acute PT services indicated. Anticipate safe D/C home w/ family.

## 2022-11-01 NOTE — THERAPY DISCHARGE NOTE
Patient Name: Alysia Aguayo  : 1945    MRN: [id number]                              Today's Date: 2022       Admit Date: 10/31/2022    Visit Dx:     ICD-10-CM ICD-9-CM   1. Cerebrovascular accident (CVA), unspecified mechanism ([facility])  I63.9 434.91   2. Left leg weakness  R29.898 729.89   3. Stroke-like symptoms  R29.90 781.99     Patient Active Problem List   Diagnosis   • Cervical spondylosis without myelopathy   • Myofascial pain   • Frequent headaches   • History of alcohol abuse   • CAD s/p stent placement   • History of lumbar laminectomy for spinal cord decompression   • Occipital neuralgia of left side   • Lumbar stenosis with neurogenic claudication   • DDD (degenerative disc disease), lumbar   • Anxiety and Depression   • Left arm numbness   • Essential hypertension   • Mixed hyperlipidemia   • Stroke-like symptoms   • Cerebrovascular accident (CVA), unspecified mechanism ([facility])     Past Medical History:   Diagnosis Date   • Arthritis    • Back pain    • Carotid artery disease ([facility])    • Coronary artery disease    • Depression    • GERD (gastroesophageal reflux disease)    • History of alcoholism ([facility])    • Hyperlipidemia    • Hypertension    • Kidney disease     was stage 4 but taking excessive amounts of ibuprofen, f/u with nephrologist at the time, resolved since and was to see nephrologist as needed    • Neck pain    • Spastic colon    • Spinal stenosis    • Stroke ([facility])    • Wears dentures    • Wears reading eyeglasses      Past Surgical History:   Procedure Laterality Date   • CARDIAC CATHETERIZATION  2010    x2    • CATARACT EXTRACTION Bilateral    • CHOLECYSTECTOMY     • COLONOSCOPY  2016   • CORONARY STENT PLACEMENT  2010    x3    • ESOPHAGEAL DILATATION     • HYSTERECTOMY     • LUMBAR DISCECTOMY  07/10/2014    Rt- L1/2 Dr. Dionicio Pollard    • LUMBAR LAMINECTOMY DISCECTOMY DECOMPRESSION N/A 10/17/2019    Procedure: LUMBAR LAMINECTOMY L3-5;  Surgeon: Dionicio Pollard MD;   Location: [facility];  Service: Neurosurgery   • TUBAL ABDOMINAL LIGATION     • UPPER GASTROINTESTINAL ENDOSCOPY        General Information     Row Name 11/01/22 1416          Physical Therapy Time and Intention    Document Type discharge evaluation/summary  -MB     Mode of Treatment physical therapy  -MB     Row Name 11/01/22 1416          General Information    Patient Profile Reviewed yes  -MB     Prior Level of Function independent:;bed mobility;ADL's;transfer;gait;all household mobility;community mobility;dependent:;driving  -MB     Existing Precautions/Restrictions other (see comments)  h/o orthostatic BP  -MB     Barriers to Rehab none identified  -MB     Row Name 11/01/22 1416          Living Environment    People in Home child(holly), adult;child(holly), dependent  -MB     Row Name 11/01/22 1416          Home Main Entrance    Number of Stairs, Main Entrance two  -MB     Stair Railings, Main Entrance none  -MB     Row Name 11/01/22 1416          Stairs Within Home, Primary    Stairs, Within Home, Primary 15 stairs to basement laundry  -MB     Number of Stairs, Within Home, Primary other (see comments)  15  -MB     Stair Railings, Within Home, Primary railings on both sides of stairs  -MB     Row Name 11/01/22 1416          Cognition    Orientation Status (Cognition) oriented x 4  -MB     Row Name 11/01/22 1416          Safety Issues, Functional Mobility    Impairments Affecting Function (Mobility) endurance/activity tolerance  -MB           User Key  (r) = Recorded By, (t) = Taken By, (c) = Cosigned By    Initials Name Provider Type    Lorena Winston, PT Physical Therapist               Mobility     Row Name 11/01/22 1603          Bed Mobility    Bed Mobility supine-sit;sit-supine  -MB     Scooting/Bridging [place] (Bed Mobility) independent  -MB     Supine-Sit [place] (Bed Mobility) independent  -MB     Row Name 11/01/22 1603          Bed-Chair Transfer    Bed-Chair [place] (Transfers)  independent  -MB     Row Name 11/01/22 1603          Sit-Stand Transfer    Sit-Stand [place] (Transfers) independent  -MB     Row Name 11/01/22 1603          Gait/Stairs (Locomotion)    [place] Level (Gait) independent  -MB     Distance in Feet (Gait) 55  -MB     Comment, (Gait/Stairs) Pt. ambulated w/ step through gait pattern, navigating obstacles and changing directions. No LOB or instability observed.  -MB           User Key  (r) = Recorded By, (t) = Taken By, (c) = Cosigned By    Initials Name Provider Type    Lorena Winston, PT Physical Therapist               Obj/Interventions     Row Name 11/01/22 1604          Range of Motion Comprehensive    General Range of Motion no range of motion deficits identified  -[facility] Name 11/01/22 1604          Strength Comprehensive (MMT)    General Manual Muscle Testing (MMT) Assessment upper extremity strength deficits identified  -MB     Comment, General Manual Muscle Testing (MMT) Assessment BLEs symmetrical 4+/5  -MB     Row Name 11/01/22 1604          Motor Skills    Motor Skills coordination  -MB     Coordination bilateral;heel to shin;WFL  -[facility] Name 11/01/22 1604          Balance    Balance Assessment standing dynamic balance;standing static balance  -MB     Static Standing Balance supervision  -MB     Dynamic Standing Balance standby assist  -MB     Position/Device Used, Standing Balance unsupported  -MB     Balance Interventions standing;sit to stand;dynamic;highly challenging;narrowed base of support;weight shifting activity;vision occluded activity  -MB     Comment, Balance mild sway w/ narrow RAMEZ/eyes closed  -MB     Row Name 11/01/22 1604          Sensory Assessment (Somatosensory)    Sensory Assessment (Somatosensory) LE sensation intact  -MB           User Key  (r) = Recorded By, (t) = Taken By, (c) = Cosigned By    Initials Name Provider Type    Lorena Winston, PT Physical Therapist               Goals/Plan    No  documentation.                Clinical Impression     Row Name 11/01/22 1606          Pain    Pretreatment Pain Rating 0/10 - no pain  -MB     Posttreatment Pain Rating 0/10 - no pain  -MB     Row Name 11/01/22 1606          Pain Scale: FACES Pre/Post-Treatment    Pain: FACES Scale, Pretreatment 0-->no hurt  -MB     Posttreatment Pain Rating 0-->no hurt  -MB     Row Name 11/01/22 1606          Plan of Care Review    Plan of Care Reviewed With patient;daughter  -MB     Progress improving  -MB     Outcome Evaluation PT eval completed. Patient pleasant and motivated. She presents w/ good/symmetrical LE strength and appears near baseline mobility. She completed bed mobility and transfers independently and ambulated 55ft completing dynamic gait activities w/ no LOB or instability. No further acute PT services indicated. Anticipate safe D/C home w/ family.  -MB     Row Name 11/01/22 1606          Therapy Assessment/Plan (PT)    Criteria for Skilled Interventions Met (PT) no;no problems identified which require skilled intervention  -MB     Therapy Frequency (PT) evaluation only  -MB     Row Name 11/01/22 1606          Vital Signs    Pre Systolic BP Rehab 151  -MB     Pre Treatment Diastolic BP 74  -MB     Intra Systolic BP Rehab 148  -MB     Intra Treatment Diastolic BP 75  -MB     Pre Patient Position Supine  -MB     Intra Patient Position Standing  -MB     Post Patient Position Supine  -MB     Row Name 11/01/22 1606          Positioning and Restraints    Pre-Treatment Position in bed  -MB     Post Treatment Position bed  -MB     In Bed notified nsg;supine;call light within reach;encouraged to call for assist;with family/caregiver  -MB           User Key  (r) = Recorded By, (t) = Taken By, (c) = Cosigned By    Initials Name Provider Type    Lorena Winston, PT Physical Therapist               Outcome Measures     Row Name 11/01/22 1609          How much help from another person do you currently need...    Turning  from your back to your side while in flat bed without using bedrails? 4  -MB     Moving from lying on back to sitting on the side of a flat bed without bedrails? 4  -MB     Moving to and from a bed to a chair (including a wheelchair)? 4  -MB     Standing up from a chair using your arms (e.g., wheelchair, bedside chair)? 4  -MB     Climbing 3-5 steps with a railing? 3  -MB     To walk in hospital room? 4  -MB     AM-PAC 6 Clicks Score (PT) 23  -MB     Highest level of mobility 7 --> Walked 25 feet or more  -MB     Row Name 11/01/22 1404          Modified [place] Scale    Pre-Stroke Modified [place] Scale 6 - Unable to determine (UTD) from the medical record documentation  -JY     Modified [place] Scale 1 - No significant disability despite symptoms.  Able to carry out all usual duties and activities.  -JTRIPP     Row Name 11/01/22 1609 11/01/22 1404       Functional Assessment    Outcome Measure Options AM-PAC 6 Clicks Basic Mobility (PT)  -MB AM-PAC 6 Clicks Daily Activity (OT);Modified [place]  -JY          User Key  (r) = Recorded By, (t) = Taken By, (c) = Cosigned By    Initials Name Provider Type    Lorena Winston, PT Physical Therapist    Maia Sorenson, OT Occupational Therapist              Physical Therapy Education     Title: PT OT SLP Therapies (In Progress)     Topic: Physical Therapy (In Progress)     Point: Mobility training (Done)     Learning Progress Summary           Patient Acceptance, E,D, VU by MB at 11/1/2022 1609   Family Acceptance, E,D, VU by MB at 11/1/2022 1609                   Point: Home exercise program (Not Started)     Learner Progress:  Not documented in this visit.          Point: Body mechanics (Done)     Learning Progress Summary           Patient Acceptance, E,D, VU by MB at 11/1/2022 1609   Family Acceptance, E,D, VU by MB at 11/1/2022 1609                   Point: Precautions (Done)     Learning Progress Summary           Patient Acceptance, E,D, VU by MB at 11/1/2022 1609    Family Acceptance, E,D, VU by MB at 11/1/2022 1609                               User Key     Initials Effective Dates Name Provider Type Discipline    MB 06/16/21 -  Lorena Michaels, PT Physical Therapist PT              PT Recommendation and Plan     Plan of Care Reviewed With: patient, daughter  Progress: improving  Outcome Evaluation: PT eval completed. Patient pleasant and motivated. She presents w/ good/symmetrical LE strength and appears near baseline mobility. She completed bed mobility and transfers independently and ambulated 55ft completing dynamic gait activities w/ no LOB or instability. No further acute PT services indicated. Anticipate safe D/C home w/ family.     Time Calculation:    PT Charges     Row Name 11/01/22 1611             Time Calculation    Start Time 1416  -MB      PT Received On 11/01/22  -MB         Untimed Charges    PT Eval/Re-eval Minutes 46  -MB         Total Minutes    Untimed Charges Total Minutes 46  -MB       Total Minutes 46  -MB            User Key  (r) = Recorded By, (t) = Taken By, (c) = Cosigned By    Initials Name Provider Type    Lorena Winston, PT Physical Therapist              Therapy Charges for Today     Code Description Service Date Service Provider Modifiers Qty    [id number] HC PT EVAL LOW COMPLEXITY 4 11/1/2022 Lorena Michaels, PT GP 1          PT G-Codes  Outcome Measure Options: AM-PAC 6 Clicks Basic Mobility (PT)  AM-PAC 6 Clicks Score (PT): 23  AM-PAC 6 Clicks Score (OT): 20  Modified [place] Scale: 1 - No significant disability despite symptoms.  Able to carry out all usual duties and activities.    PT Discharge Summary  Anticipated Discharge Disposition (PT): home with assist  Reason for Discharge: At baseline function    Lorena Michaels PT  11/1/2022

## 2022-11-01 NOTE — PLAN OF CARE
Goal Outcome Evaluation:  Plan of Care Reviewed With: patient, daughter        Progress: improving  Outcome Evaluation: OT evaluation completed. Pt presents w/ mild decrease in I in ADLs, related t/fs, mobility compared to PLOF limited by decreased functional endurance, postural sway/mild balance deficits during prolonged standing and when pt demonstrated more brisk turning, transition in room, OT cued pt to control pace and avoid abrupt changes in position. No significant decrease in BP with change in position between supine> sitting> and standing. Pt completed hand hygiene at sink with I, clothing mgmt related to toileting with spv, related hygiene with I, d/d socks with I and CGA for d/d gown posteriorly and gross CGA for fxl transfers and in room ADL related mobility w/o AD yet with review of cues for controlled pace and safety. Recommend IPOT POC and home with family A at d/c and HHOT at least initially for transitional training.

## 2022-11-01 NOTE — PROGRESS NOTES
Stroke Progress Note       Chief Complaint:  Worsening left sided weakness    Subjective    Subjective     Subjective:  Symptoms have resolved. BP in right arm noted to be 75/44, left arm 122/60. Carotid Duplex this morning with critical right innominate artery stenosis.     Review of Systems   All other systems reviewed and are negative.           Objective    Objective      Temp:  [97.6 °F (36.4 °C)-98.3 °F (36.8 °C)] 98.2 °F (36.8 °C)  Heart Rate:  [60-80] 64  Resp:  [16-18] 16  BP: ()/(44-86) 116/59    Neurological Exam  Mental Status  Awake, alert and oriented to person, place and time.Alert. Oriented to person, place, and time. Speech is normal. Language is fluent with no aphasia. Attention and concentration are normal.     Cranial Nerves  CN II: Visual acuity is normal. Visual fields full to confrontation.  CN III, IV, VI: Extraocular movements intact bilaterally. Normal lids and orbits bilaterally. Pupils equal round and reactive to light bilaterally.  CN V: Facial sensation is normal.  CN VII: Full and symmetric facial movement.  CN IX, X: Palate elevates symmetrically  CN XI: Shoulder shrug strength is normal.  CN XII: Tongue midline without atrophy or fasciculations.     Motor   Strength is 5/5 throughout all four extremities.     Sensory  Light touch is normal in upper and lower extremities.      Reflexes                                            Right                      Left  Plantar                           Downgoing                Downgoing     Coordination  Right: Finger-to-nose normal.Left: Finger-to-nose normal.     Gait   Normal gait.Casual gait is normal including stance, stride, and arm swing.        Physical Exam  Constitutional:       Appearance: Normal appearance.   HENT:      Head: Normocephalic and atraumatic.   Eyes:      General: Lids are normal.      Extraocular Movements: Extraocular movements intact.      Pupils: Pupils are equal, round, and reactive to light.    Cardiovascular:      Rate and Rhythm: Normal rate and regular rhythm.   Pulmonary:      Effort: Pulmonary effort is normal. No respiratory distress.   Abdominal:      General: There is no distension.      Palpations: Abdomen is soft.   Musculoskeletal:         General: Normal range of motion.      Cervical back: Normal range of motion and neck supple.   Skin:     General: Skin is warm and dry.      Capillary Refill: Capillary refill takes less than 2 seconds.   Neurological:      Mental Status: She is alert and oriented to person, place, and time.      Cranial Nerves: No cranial nerve deficit.      Sensory: No sensory deficit.      Motor: Motor strength is normal. No weakness.      Coordination: Coordination normal.      Gait: Gait normal.   Psychiatric:         Mood and Affect: Mood normal.         Speech: Speech normal.          Results Review:    I reviewed the patient's new clinical results.    Results for orders placed during the hospital encounter of 06/27/22    Adult Transthoracic Echo Complete W/ Cont if Necessary Per Protocol (With Agitated Saline)    Interpretation Summary  · Saline test results are negative.  · Estimated left ventricular EF = 60% Left ventricular systolic function is normal.            Assessment/Plan     Assessment/Plan:  77 yr old female with a PMH significant for RMCA stroke (6/2022, intermittent difficulty with ambulation), remote tobacco abuse, remote alcohol abuse, and spastic colon who presents to the [facility] ED with c/o worsening left lower extremity weakness and difficulty with ambulation, and word finding difficulty that began 10/31 @ 0330.  Symptoms resolved on arrival to [facility].    Worsening left-sided weakness  -Likely stroke recrudescence of previous stroke symptoms, cannot rule out TIA  -MRI brain with no evidence of AIS  -MRA H/N with no LVO, signal drop out in the right vertebral artery.   -CUS; Critical right innominate artery stenosis   -Neuro intervention consult; Plan for  angiogram and possible stent.  -Continue aspirin, Plavix. Pt initially reported that she was taking Plavix. She did stop it. Continue Plavix.  -HLD, high-dose statin. LDL 59.   -Dizziness; meclizine as needed  -HTN; Normalize BP goals.   -EKG in normal sinus rhythm.  Patient reported an irregular heartbeat on her home blood pressure machine 10/31.  Previous event monitor negative for atrial arrhythmias.  -Up with assistance  -Keep follow-up in the stroke clinic on 12/5  -Plan discussed with the patient, her daughter, primary team, and nursing staff. Will see patient after angiogram with Dr. Concepcion. Please call with any questions or concerns.    HUE Lopez, AGACNP-BC  11/01/22  09:02 EDT

## 2022-11-01 NOTE — THERAPY EVALUATION
Patient Name: Alysia Aguayo  : 1945    MRN: [id number]                              Today's Date: 2022       Admit Date: 10/31/2022    Visit Dx:     ICD-10-CM ICD-9-CM   1. Cerebrovascular accident (CVA), unspecified mechanism ([facility])  I63.9 434.91   2. Left leg weakness  R29.898 729.89   3. Stroke-like symptoms  R29.90 781.99     Patient Active Problem List   Diagnosis   • Cervical spondylosis without myelopathy   • Myofascial pain   • Frequent headaches   • History of alcohol abuse   • CAD s/p stent placement   • History of lumbar laminectomy for spinal cord decompression   • Occipital neuralgia of left side   • Lumbar stenosis with neurogenic claudication   • DDD (degenerative disc disease), lumbar   • Anxiety and Depression   • Left arm numbness   • Essential hypertension   • Mixed hyperlipidemia   • Stroke-like symptoms   • Cerebrovascular accident (CVA), unspecified mechanism ([facility])     Past Medical History:   Diagnosis Date   • Arthritis    • Back pain    • Carotid artery disease ([facility])    • Coronary artery disease    • Depression    • GERD (gastroesophageal reflux disease)    • History of alcoholism ([facility])    • Hyperlipidemia    • Hypertension    • Kidney disease     was stage 4 but taking excessive amounts of ibuprofen, f/u with nephrologist at the time, resolved since and was to see nephrologist as needed    • Neck pain    • Spastic colon    • Spinal stenosis    • Stroke ([facility])    • Wears dentures    • Wears reading eyeglasses      Past Surgical History:   Procedure Laterality Date   • CARDIAC CATHETERIZATION  2010    x2    • CATARACT EXTRACTION Bilateral    • CHOLECYSTECTOMY     • COLONOSCOPY  2016   • CORONARY STENT PLACEMENT  2010    x3    • ESOPHAGEAL DILATATION     • HYSTERECTOMY     • LUMBAR DISCECTOMY  07/10/2014    Rt- L1/2 Dr. Dionicio Pollard    • LUMBAR LAMINECTOMY DISCECTOMY DECOMPRESSION N/A 10/17/2019    Procedure: LUMBAR LAMINECTOMY L3-5;  Surgeon: Dionicio Pollard MD;   [facility];  Service: Neurosurgery   • TUBAL ABDOMINAL LIGATION     • UPPER GASTROINTESTINAL ENDOSCOPY        General Information     Row Name 11/01/22 1322          OT Time and Intention    Document Type evaluation  -JY     Mode of Treatment occupational therapy;individual therapy  -JY     Row Name 11/01/22 1322          General Information    Patient Profile Reviewed yes  -JY     Prior Level of Function independent:;all household mobility;community mobility;gait;transfer;bed mobility;feeding;grooming;dressing;bathing;home management;cooking;cleaning;using stairs;dependent:;driving  pt grossly I in all ADLs, related t/fs and mobility; used SPC PRN usually when navigating uneven surfaces outside of home; dep for driving  -JY     Existing Precautions/Restrictions fall;other (see comments)  check orthostatics per chart, remote CVA (6/22) w/ intermittent word finding and difficulty with ambulation at baseline, remote deficit at R eye  -JY     Barriers to Rehab none identified  -JY     Row Name 11/01/22 1322          Occupational Profile    Environmental Supports and Barriers (Occupational Profile) step over tub w/ access to shower chair however not using at baseline, standard toilet height, DME: SPC used PRN usually on uneven surfaces outside of home  -JY     Row Name 11/01/22 1322          Living Environment    People in Home child(holly), adult;grandchild(holly);other (see comments)  lives with dtr, NIMISHA and grandchild who are able to assist as needed when at home and not at work/school  -JY     Row Name 11/01/22 1322          Home Main Entrance    Number of Stairs, Main Entrance two  -JY     Stair Railings, Main Entrance none  -JY     Row Name 11/01/22 1322          Stairs Within Home, Primary    Stairs, Within Home, Primary flght of 15 steps to basement in which pt prefers to access to complete own laundry  -JY     Number of Stairs, Within Home, Primary other (see comments)  15  -JY     Stair Railings, Within  Home, Primary railings on both sides of stairs  -JY     Row Name 11/01/22 1322          Cognition    Orientation Status (Cognition) oriented x 4  -JY     Row Name 11/01/22 1322          Safety Issues, Functional Mobility    Safety Issues Affecting Function (Mobility) other (see comments)  no obvious observed safety issues, pt aware of deficits  -JY     Impairments Affecting Function (Mobility) balance;endurance/activity tolerance;strength  -JY     Comment, Safety Issues/Impairments (Mobility) pt alert and able to follow commands; instances of postural sway, mild LOB during prolonged standing and during more brisk change in position; cued to control pace and demonstrate slow, controlled movements  -JY           User Key  (r) = Recorded By, (t) = Taken By, (c) = Cosigned By    Initials Name Provider Type    Maia Sorenson OT Occupational Therapist                 Mobility/ADL's     Row Name 11/01/22 1329          Bed Mobility    Bed Mobility supine-sit;scooting/bridging  -JY     Scooting/Bridging [place] (Bed Mobility) independent  -JY     Supine-Sit [place] (Bed Mobility) independent  -JY     Comment, (Bed Mobility) pt did not require any physical A for bed mobility and demonstrated supine> sitting and scooting toward side w/o supports to simulate bed at home; denied any dizziness or feeling LH, BP at supine 136/62 and upon sitting /82  -JY     Row Name 11/01/22 1329          Transfers    Transfers sit-stand transfer;stand-sit transfer;toilet transfer  -JY     Comment, (Transfers) demonstrated good safety awareness in regard to close proximity to seated surface and reaching back/pushing upward, cued for slower, controlled movement patterns for safety yet for decreased risk of dizziness; gross CGA provided d/t observed postural sway in standing as time progressed before fxl mobility, when vision occluded postural sway more noted and pt reached for supplemental support  -JY     Row Name 11/01/22  1329          Sit-Stand Transfer    Sit-Stand [place] (Transfers) contact guard;verbal cues  -JY     Assistive Device (Sit-Stand Transfers) other (see comments)  no AD, gait belt w/ CGA for safety purposes  -ALMA     [facility] Name 11/01/22 1329          Stand-Sit Transfer    Stand-Sit [place] (Transfers) contact guard;verbal cues  -JY     Assistive Device (Stand-Sit Transfers) other (see comments)  no AD, gait belt w/ CGA for safety purposes  -TRIPP     [facility] Name 11/01/22 1329          Toilet Transfer    Type (Toilet Transfer) sit-stand;stand-sit  -JY     [place] Level (Toilet Transfer) contact guard;verbal cues  -JY     Assistive Device (Toilet Transfer) commode;grab bars/safety frame;other (see comments)  no AD, gait belt w/ CGA for safety purposes  -[facility] Name 11/01/22 1329          Functional Mobility    Functional Mobility- Ind. Level contact guard assist;verbal cues required  -     Functional Mobility- Device other (see comments)  no AD, gait belt w/ CGA for safety purposes  -     Functional Mobility-Distance (Feet) --  in room ADL related distances  -     Functional Mobility- Safety Issues balance decreased during turns  -     Functional Mobility- Comment defer to PT for specifics regarding fxl mobility and any recommended AD, however during in room ADL related mobility pt req'd CGA largely for safety and balance for intermittent postural sway; did not use AD during said assessments however gait belt donned and CGA provided  -[facility] Name 11/01/22 1329          Activities of Daily Living    BADL Assessment/Intervention upper body dressing;lower body dressing;toileting;grooming  -[facility] Name 11/01/22 1329          Upper Body Dressing Assessment/Training    [place] Level (Upper Body Dressing) doff;don;pajama/robe;contact guard assist  -     Position (Upper Body Dressing) unsupported sitting;supported standing  -     Comment, (Upper Body Dressing) gross CGA for posterior mgmt of  gown, pt preferred to complete in standing, educated pt on concerns for completing overhead donning or dynamic clothing mgmt in standing given observed postural sway with prolonged standing and when vision occluded  -JY     Row Name 11/01/22 1329          Lower Body Dressing Assessment/Training    [place] Level (Lower Body Dressing) doff;don;socks;independent  -JY     Position (Lower Body Dressing) sitting up in bed;long sitting  -JY     Comment, (Lower Body Dressing) pt sat up in bed and able to d/d socks without any A or perceived difficulty  -JY     Row Name 11/01/22 1329          Toileting Assessment/Training    [place] Level (Toileting) perform perineal hygiene;independent;adjust/manage clothing;supervision;verbal cues  -JY     Assistive Devices (Toileting) commode;grab bar/safety frame  -JY     Position (Toileting) unsupported sitting  -JY     Comment, (Toileting) spv for more dynamic standing with reach away from RAMEZ to manage incontinence supplies & underwear posteriorly  -JY     Row Name 11/01/22 1329          Grooming Assessment/Training    [place] Level (Grooming) wash face, hands;independent  -JY     Position (Grooming) supported standing;sink side  -JY           User Key  (r) = Recorded By, (t) = Taken By, (c) = Cosigned By    Initials Name Provider Type    Maia Sorenson OT Occupational Therapist               Obj/Interventions     Row Name 11/01/22 1345          Sensory Assessment (Somatosensory)    Sensory Assessment (Somatosensory) bilateral UE;sensation intact  -JY     Bilateral UE Sensory Assessment general sensation;light touch awareness;light touch localization;intact  -JY     Sensory Assessment denies any numbness or tingling and able to recognize all LT stimuli as intact and symmetrical  -JY     Row Name 11/01/22 1348          Vision Assessment/Intervention    Visual Impairment/Limitations corrective lenses for reading;visual/perceptual impairments present;other (see  comments)  remote R side eye deficit  -JY     Vision Assessment Comment remote R eye deficit, able to track L < > R, up and down and identify L & R stimuli correctly  -JY     Row Name 11/01/22 1343          Range of Motion Comprehensive    General Range of Motion bilateral upper extremity ROM WFL  -     Row Name 11/01/22 1343          Strength Comprehensive (MMT)    General Manual Muscle Testing (MMT) Assessment upper extremity strength deficits identified  -JY     Comment, General Manual Muscle Testing (MMT) Assessment BUE MMS grossly 4 to 4+/5 per MMT  -J     Row Name 11/01/22 1343          Motor Skills    Motor Skills coordination;functional endurance  -JY     Functional Endurance mild decrease in activity tolerance toward more dynamic tasks, seated rest break after toileting, hand hygiene at sink and related mobility  -JY     Row Name 11/01/22 1343          Balance    Balance Assessment sitting static balance;sitting dynamic balance;standing static balance;standing dynamic balance  -JY     Static Sitting Balance independent  -JY     Dynamic Sitting Balance supervision  -JY     Position, Sitting Balance unsupported;sitting edge of bed;other (see comments)  sitting up in bed while completing LBD and sitting on toilet for voiding  -JY     Static Standing Balance contact guard  -JY     Dynamic Standing Balance contact guard;verbal cues  -JY     Position/Device Used, Standing Balance supported;unsupported;other (see comments)  no AD, gait belt for CGA provided  -JY     Balance Interventions sitting;standing;static;dynamic;sit to stand;supported;occupation based/functional task  -JY     Comment, Balance no overt LOB during seated or standing tasks, postural sway noted during prolonged standing and when completing turning w/ more brisk movement patterns  -JY           User Key  (r) = Recorded By, (t) = Taken By, (c) = Cosigned By    Initials Name Provider Type    Maia Sorenson, OT Occupational Therapist                Goals/Plan     Row Name 11/01/22 1401          Transfer Goal 1 (OT)    Activity/Assistive Device (Transfer Goal 1, OT) sit-to-stand/stand-to-sit;bed-to-chair/chair-to-bed;toilet;other (see comments)  recommended AD as/if necessary  -JY     [place] Level/Cues Needed (Transfer Goal 1, OT) standby assist  -JY     Time Frame (Transfer Goal 1, OT) long term goal (LTG);by discharge  -JY     Progress/Outcome (Transfer Goal 1, OT) new goal  -JY     Row Name 11/01/22 1401          Dressing Goal 1 (OT)    Activity/Device (Dressing Goal 1, OT) upper body dressing;lower body dressing;other (see comments)  d/d TB garments w/ AE PRN  -JY     [place]/Cues Needed (Dressing Goal 1, OT) modified independence  -JY     Time Frame (Dressing Goal 1, OT) long term goal (LTG);by discharge  -JY     Progress/Outcome (Dressing Goal 1, OT) new goal  -JY     Row Name 11/01/22 1401          Toileting Goal 1 (OT)    Activity/Device (Toileting Goal 1, OT) adjust/manage clothing;perform perineal hygiene;commode;grab bar/safety frame;raised toilet seat  -JY     [place] Level/Cues Needed (Toileting Goal 1, OT) modified independence;independent  -JY     Time Frame (Toileting Goal 1, OT) long term goal (LTG);by discharge  -JY     Progress/Outcome (Toileting Goal 1, OT) new goal  -JY     Row Name 11/01/22 1401          Strength Goal 1 (OT)    Strength Goal 1 (OT) Pt to complete seated HEP encompassing BUE strength and endurance w/ progressive sets/reps/resistance in order to improve integration in ADLs, related t/fs,  -JY     Time Frame (Strength Goal 1, OT) long term goal (LTG);by discharge  -JY     Progress/Outcome (Strength Goal 1, OT) new goal  -JY     Row Name 11/01/22 1401          Therapy Assessment/Plan (OT)    Planned Therapy Interventions (OT) activity tolerance training;adaptive equipment training;BADL retraining;functional balance retraining;occupation/activity based interventions;patient/caregiver  education/training;strengthening exercise;transfer/mobility retraining  -JY           User Key  (r) = Recorded By, (t) = Taken By, (c) = Cosigned By    Initials Name Provider Type    Maia Sorenson, MAYA Occupational Therapist               Clinical Impression     Row Name 11/01/22 4188          Pain Assessment    Pretreatment Pain Rating 0/10 - no pain  -JY     Posttreatment Pain Rating 0/10 - no pain  -JY     Pre/Posttreatment Pain Comment denies any pain and tolerated all OT interventions  -JY     Pain Intervention(s) Repositioned;Ambulation/increased activity  -JTRIPP     Row Name 11/01/22 1178          Plan of Care Review    Plan of Care Reviewed With patient;daughter  -ALMA     Progress improving  -JY     Outcome Evaluation OT evaluation completed. Pt presents w/ mild decrease in I in ADLs, related t/fs, mobility compared to PLOF limited by decreased functional endurance, postural sway/mild balance deficits during prolonged standing and when pt demonstrated more brisk turning, transition in room, OT cued pt to control pace and avoid abrupt changes in position. No significant decrease in BP with change in position between supine> sitting> and standing. Pt completed hand hygiene at sink with I, clothing mgmt related to toileting with spv, related hygiene with I, d/d socks with I and CGA for d/d gown posteriorly and gross CGA for fxl transfers and in room ADL related mobility w/o AD yet with review of cues for controlled pace and safety. Recommend IPOT POC and home with family A at d/c and HHOT at least initially for transitional training.  -ALMA     Row Name 11/01/22 1529          Therapy Assessment/Plan (OT)    Patient/Family Therapy Goal Statement (OT) to maximize I in ADLs, related t/fs and mobility  -JY     Rehab Potential (OT) good, to achieve stated therapy goals  -JY     Criteria for Skilled Therapeutic Interventions Met (OT) yes;skilled treatment is necessary  -JY     Therapy Frequency (OT) daily  -ALMA [place]  Name 11/01/22 1349          Therapy Plan Review/Discharge Plan (OT)    Anticipated Discharge Disposition (OT) home with assist  -JY     Row Name 11/01/22 1349          Vital Signs    Pre Systolic BP Rehab 136  -JY     Pre Treatment Diastolic BP 62  -JY     Intra Systolic BP Rehab 129  -JY     Intra Treatment Diastolic BP 82  -JY     Post Systolic BP Rehab 151  -JY     Post Treatment Diastolic BP 74  -JY     Pretreatment Heart Rate (beats/min) 79  -JY     Posttreatment Heart Rate (beats/min) 80  -JY     Pre SpO2 (%) --  no pulse oximeter donned to pt, RN approved OT session  -JY     O2 Delivery Pre Treatment room air  -JY     O2 Delivery Intra Treatment room air  -JY     O2 Delivery Post Treatment room air  -JY     Pre Patient Position Supine  -JY     Intra Patient Position Standing  -JY     Post Patient Position Sitting  -JY     Row Name 11/01/22 1349          Positioning and Restraints    Pre-Treatment Position in bed  -JY     Post Treatment Position chair  -JY     In Chair notified nsg;reclined;call light within reach;encouraged to call for assist;with family/caregiver;legs elevated  -JY           User Key  (r) = Recorded By, (t) = Taken By, (c) = Cosigned By    Initials Name Provider Type    Maia Sorenson, MAYA Occupational Therapist               Outcome Measures     Row Name 11/01/22 9136          How much help from another is currently needed...    Putting on and taking off regular lower body clothing? 3  -JY     Bathing (including washing, rinsing, and drying) 3  -JY     Toileting (which includes using toilet bed pan or urinal) 3  -JY     Putting on and taking off regular upper body clothing 3  -JY     Taking care of personal grooming (such as brushing teeth) 4  -JY     Eating meals 4  -JY     AM-PAC 6 Clicks Score (OT) 20  -JY     Row Name 11/01/22 1404          Modified [place] Scale    Pre-Stroke Modified [place] Scale 6 - Unable to determine (UTD) from the medical record documentation  -JY     Modified  [place] Scale 1 - No significant disability despite symptoms.  Able to carry out all usual duties and activities.  -SRINIVASTRIPP     Row Name 11/01/22 1404          Functional Assessment    Outcome Measure Options AM-PAC 6 Clicks Daily Activity (OT);Modified [place]  -ALMA           User Key  (r) = Recorded By, (t) = Taken By, (c) = Cosigned By    Initials Name Provider Type    Maia Sorenson OT Occupational Therapist                Occupational Therapy Education     Title: PT OT SLP Therapies (In Progress)     Topic: Occupational Therapy (In Progress)     Point: ADL training (In Progress)     Description:   Instruct learner(s) on proper safety adaptation and remediation techniques during self care or transfers.   Instruct in proper use of assistive devices.              Learning Progress Summary           Patient Acceptance, E,D, NR by ALMA at 11/1/2022 1030   Family Acceptance, E,D, NR by ALMA at 11/1/2022 1030                   Point: Home exercise program (Not Started)     Description:   Instruct learner(s) on appropriate technique for monitoring, assisting and/or progressing therapeutic exercises/activities.              Learner Progress:  Not documented in this visit.          Point: Precautions (In Progress)     Description:   Instruct learner(s) on prescribed precautions during self-care and functional transfers.              Learning Progress Summary           Patient Acceptance, E,D, NR by ALMA at 11/1/2022 1030   Family Acceptance, E,D, NR by ALMA at 11/1/2022 1030                   Point: Body mechanics (In Progress)     Description:   Instruct learner(s) on proper positioning and spine alignment during self-care, functional mobility activities and/or exercises.              Learning Progress Summary           Patient Acceptance, E,D, NR by ALMA at 11/1/2022 1030   Family Acceptance, E,D, NR by ALMA at 11/1/2022 1030                               User Key     Initials Effective Dates Name Provider Type Discipline    ALMA  06/16/21 -  Maia Layne OT Occupational Therapist OT              OT Recommendation and Plan  Planned Therapy Interventions (OT): activity tolerance training, adaptive equipment training, BADL retraining, functional balance retraining, occupation/activity based interventions, patient/caregiver education/training, strengthening exercise, transfer/mobility retraining  Therapy Frequency (OT): daily  Plan of Care Review  Plan of Care Reviewed With: patient, daughter  Progress: improving  Outcome Evaluation: OT evaluation completed. Pt presents w/ mild decrease in I in ADLs, related t/fs, mobility compared to PLOF limited by decreased functional endurance, postural sway/mild balance deficits during prolonged standing and when pt demonstrated more brisk turning, transition in room, OT cued pt to control pace and avoid abrupt changes in position. No significant decrease in BP with change in position between supine> sitting> and standing. Pt completed hand hygiene at sink with I, clothing mgmt related to toileting with spv, related hygiene with I, d/d socks with I and CGA for d/d gown posteriorly and gross CGA for fxl transfers and in room ADL related mobility w/o AD yet with review of cues for controlled pace and safety. Recommend IPOT POC and home with family A at d/c and HHOT at least initially for transitional training.     Time Calculation:    Time Calculation- OT     Row Name 11/01/22 1406             Time Calculation- OT    OT Start Time 1130  -JY      OT Received On 11/01/22  -JY      OT Goal Re-Cert Due Date 11/11/22  -JY         Timed Charges    65454 - OT Therapeutic Activity Minutes 10  -JY      20522 - OT Self Care/Mgmt Minutes 15  -JY         Untimed Charges    OT Eval/Re-eval Minutes 49  -JY         Total Minutes    Timed Charges Total Minutes 25  -JY      Untimed Charges Total Minutes 49  -JY       Total Minutes 74  -JY            User Key  (r) = Recorded By, (t) = Taken By, (c) = Cosigned By    Initials  Name Provider Type    Maia Sorenson OT Occupational Therapist              Therapy Charges for Today     Code Description Service Date Service Provider Modifiers Qty    05668017223 HC OT THERAPEUTIC ACT EA 15 MIN 11/1/2022 Maia Layne OT GO 1    46320898243 HC OT SELF CARE/MGMT/TRAIN EA 15 MIN 11/1/2022 Maia Layne OT GO 1    [id number]  OT EVAL LOW COMPLEXITY 4 11/1/2022 Maia Layne OT GO 1               Maia Layne OT  11/1/2022

## 2022-11-01 NOTE — DISCHARGE SUMMARY
[facility] Medicine Services  DISCHARGE SUMMARY    Patient Name: Alysia Aguayo  : 1945  MRN: [id number]    Date of Admission: 10/31/2022  2:29 PM  Date of Discharge:  2022  Primary Care Physician: Yesica Zhu PA-C    Consults     Date and Time Order Name Status Description    10/31/2022  2:45 PM Inpatient Neurology Consult Stroke            Hospital Course     Presenting Problem:   Cerebrovascular accident (CVA), unspecified mechanism (HCC) [I63.9]    Active Hospital Problems    Diagnosis  POA   • **Cerebrovascular accident (CVA), unspecified mechanism (HCC) [I63.9]  Yes      Resolved Hospital Problems   No resolved problems to display.          Hospital Course:  Alysia Aguayo is a 77 y.o. female with history of  CAD s/p stent placement in , HTN, HLD, CKD, and R MCA CVA in 2022 and intermittent L side weakness since then, presented to ED after noticing L side weakness and trouble getting words out.  Back to baseline by time of arrival.     L side weakness/expr aphasia, transient  -- suspect recrudescent symptoms associated with intermittent hypotension   -- BP fell to 97/62 in ED  -- stroke team following; MRI/MRA as below  -- continue ASA Plavix and high intensity statin   -- P2Y12 c/w pt NOT taking Plavix (she reported that she was no longer taking this).    -- PT OT ST      HTN  - states she takes amlodipine and lisinopril 40mg x a long time  -- held on admission due to concern for CVA, continue home meds upon d/c (of note, Lisinopril was NOT on her medication rec)     Reported irreg beats seen on home BP machine  - NSR here  - consider [facility] as outpatient     CKD  - creat 1.2, baseline.       Macrocytosis  -  B12 and folate wnl  -- not anemic        Discharge Follow Up Recommendations for outpatient labs/diagnostics:   Follow up with PCP within one week  Follow up with Neurology on  as previously arranged  Day of Discharge     HPI:   Doing well  this am, denies any issues overnight.     Review of Systems  Gen- No fevers, chills  CV- No chest pain, palpitations  Resp- No cough, dyspnea  GI- No N/V/D, abd pain        Vital Signs:   Temp:  [97.6 °F (36.4 °C)-98.3 °F (36.8 °C)] 97.6 °F (36.4 °C)  Heart Rate:  [63-80] 63  Resp:  [16-18] 16  BP: ()/(54-86) 132/54      Physical Exam:  Constitutional: No acute distress, awake, alert  HENT: NCAT, mucous membranes moist  Respiratory: Clear to auscultation bilaterally, respiratory effort normal   Cardiovascular: RRR, no murmurs, rubs, or gallops  Gastrointestinal: Positive bowel sounds, soft, nontender, nondistended  Musculoskeletal: No bilateral ankle edema  Psychiatric: Appropriate affect, cooperative  Neurologic: Oriented x 3, strength symmetric in all extremities, Cranial Nerves grossly intact to confrontation, speech clear  Skin: No rashes      Pertinent  and/or Most Recent Results     LAB RESULTS:      Lab 10/31/22  1449 10/31/22  1445   WBC  --  8.57   HEMOGLOBIN  --  13.0   HEMOGLOBIN, POC  --  14.3   HEMATOCRIT  --  41.8   HEMATOCRIT POC  --  42   PLATELETS  --  253   NEUTROS ABS  --  5.57   IMMATURE GRANS (ABS)  --  0.02   LYMPHS ABS  --  2.13   MONOS ABS  --  0.68   EOS ABS  --  0.12   MCV  --  101.5*   PROTIME 12.8  --    APTT  --  28.9         Lab 10/31/22  1445   CREATININE 1.20   EGFR 47.0*         Lab 10/31/22  1445   ALT (SGPT) 14   AST (SGOT) 21         Lab 10/31/22  1449 10/31/22  1445   TROPONIN T  --  <0.010   PROTIME 12.8  --    INR 1.1  --                  Brief Urine Lab Results     None        Microbiology Results (last 10 days)     ** No results found for the last 240 hours. **          XR Chest 1 View    Result Date: 10/31/2022  DATE OF EXAM: 10/31/2022 3:09 PM  PROCEDURE: XR CHEST 1 VW-  INDICATIONS: Acute cerebrovascular accident.  COMPARISON: 6/27/2022.  TECHNIQUE: Single radiographic view of the chest was obtained.  FINDINGS: The heart size is normal. The pulmonary vascular  markings are normal. The lungs and pleural spaces are clear of active disease.  There are chronic age-related changes involving the bony thorax and thoracic aorta.      No active disease.  This report was finalized on 10/31/2022 4:13 PM by Farhat Helms MD.      CT Head Without Contrast Stroke Protocol    Result Date: 10/31/2022   DATE OF EXAM: 10/31/2022 2:39 PM  PROCEDURE: CT HEAD WO CONTRAST STROKE PROTOCOL-  INDICATIONS: Speech difficulty, left leg heaviness.  COMPARISON: 6/27/2022.  TECHNIQUE: Routine transaxial cuts were obtained through the head without the administration of contrast. Automated exposure control and iterative reconstruction methods were used.  FINDINGS: There is mild volume loss with prominence of the fissures in the sulci of the frontal lobes. The ventricles and basal cisterns are well-maintained. There are subtle areas of decreased density in the periventricular white matter felt to represent chronic microvascular ischemia. There is no acute hemorrhage, midline shift, or suspicious extra-axial fluid collections. There is thinning of the lenses suggestive of previous cataract surgery. The visualized paranasal and mastoid sinuses are clear. The calvarium is unremarkable.      IMPRESSION :  1. No acute findings. 2. Mild volume loss secondary to bilateral frontal cortical atrophy. 3. Mild chronic periventricular white matter microvascular ischemia. 4. The results were discussed with the stroke coordinator at 1442 hours.  This report was finalized on 10/31/2022 2:49 PM by Farhat Helms MD.      CT CEREBRAL PERFUSION WITH & WITHOUT CONTRAST    Result Date: 10/31/2022  DATE OF EXAM: 10/31/2022 2:42 PM  PROCEDURE: CT CEREBRAL PERFUSION W WO CONTRAST-  INDICATIONS: Acute neurologic deficit, slurred speech, left leg heaviness.  COMPARISON: No comparisons available.  TECHNIQUE: Routine transaxial cuts were obtained through the head without administration of contrast. Routine transaxial cuts were then  obtained through the head following the intravenous administration of 40 mL of Isovue 370. Core blood volume, core blood flow, mean transit time, and Tmax images were obtained utilizing the Rapid software protocol. A limited CT angiogram of the head was also performed to measure the blood vessel density.  The radiation dose reduction device was turned on for each scan per the ALARA (As Low as Reasonably Achievable) protocol.  FINDINGS: There is no ischemic core. There is no ischemic penumbra. The mismatch volume is 0 cc. The hypoperfusion index is not applicable.      No CT cerebral perfusion abnormality.  This report was finalized on 10/31/2022 2:54 PM by Farhat Helms MD.                Results for orders placed during the hospital encounter of 06/27/22    Adult Transthoracic Echo Complete W/ Cont if Necessary Per Protocol (With Agitated Saline)    Interpretation Summary  · Saline test results are negative.  · Estimated left ventricular EF = 60% Left ventricular systolic function is normal.      Plan for Follow-up of Pending Labs/Results:     Discharge Details        Discharge Medications      New Medications      Instructions Start Date   clopidogrel 75 MG tablet  Commonly known as: PLAVIX   75 mg, Oral, Daily         Changes to Medications      Instructions Start Date   aspirin 325 MG tablet  What changed: Another medication with the same name was removed. Continue taking this medication, and follow the directions you see here.   325 mg, Oral, Daily         Continue These Medications      Instructions Start Date   acetaminophen 325 MG tablet  Commonly known as: TYLENOL   650 mg, Oral, Every 4 Hours PRN      amLODIPine 5 MG tablet  Commonly known as: NORVASC   2.5 mg, Oral, 2 Times Daily      atorvastatin 80 MG tablet  Commonly known as: LIPITOR   80 mg, Oral, Nightly      Diclofenac Sodium 1 % gel gel  Commonly known as: VOLTAREN   4 g, Topical, 4 Times Daily PRN      FLUoxetine 40 MG capsule  Commonly known as:  PROzac   40 mg, Oral, Daily      glycopyrrolate 1 MG tablet  Commonly known as: ROBINUL   1 mg, Oral, 2 Times Daily      Dramamine 25 MG tablet  Generic drug: meclizine   12.5 mg, Oral, 3 Times Daily PRN      meclizine 25 MG tablet  Commonly known as: ANTIVERT   25 mg, Oral, 3 Times Daily PRN      NEXIUM PO   75 mg, Oral, Before Breakfast             Allergies   Allergen Reactions   • Penicillins Hives   • Codeine Nausea And Vomiting   • Imodium A-D [Loperamide Hcl] Nausea And Vomiting         Discharge Disposition:      Diet:  Hospital:  Diet Order   Procedures   • Diet Regular       Activity:      Restrictions or Other Recommendations:         CODE STATUS:    There are no questions and answers to display.       Future Appointments   Date Time Provider Department Center   11/30/2022 10:45 AM Yesica Zhu PA-C [facility]   12/5/2022  2:30 PM [facility] STROKE [facility]                 Veronika Becker MD  11/01/22      Time Spent on Discharge:  I spent  35  minutes on this discharge activity which included: face-to-face encounter with the patient, reviewing the data in the system, coordination of the care with the nursing staff as well as consultants, documentation, and entering orders.

## 2022-11-01 NOTE — CONSULTS
Order noted for diabetes education per stroke protocol. A1c 5.2%. No history of diabetes noted per chart review. Does not qualify for OP stroke/diabetes class. Thank you.

## 2022-11-01 NOTE — PLAN OF CARE
"Goal Outcome Evaluation:  Plan of Care Reviewed With: patient, daughter  Progress: no change  Outcome Evaluation: VSS. AOx4. NIH 0. Patient states LLE weakness \"comes and goes\"; none noted on assessment. MRI and MRA to be obtained.  "

## 2022-11-01 NOTE — CONSULTS
[facility]  Inpatient Neurointerventionalist Consult  Consult performed by: Angel Lawrence PA-C  Consult ordered by: Flaca Hutchison APRN        Name: Alysia Aguayo  YOB: 1945  MRN: [id number]    Referring Provider: No ref. provider found     Patient Care Team:  Yesica Zhu PA-C as PCP - General  Dionicio Pollard MD as Consulting Physician (Neurosurgery)  Heriberto Lopes MD as Consulting Physician (Pain Medicine)  Alana Wang APRN as Nurse Practitioner (Pain Medicine)    Chief Complaint: Stroke    Subjective     History of present illness: Patient is a 77-year-old female with past history of a right MCA stroke in June 2022, who was at home on 10/31/2022 around 9 AM when she began to experience pronounced left-sided weakness, she reports that she was unable to lift her left arm up for an extended period time and found herself being unable to enunciate certain words.  She reports having another instance like this 2 to 3 weeks prior, for which she did not seek medical treatment.  Her last known well prior to this left-sided weakness was around 3: 30 on 10/31/2022 when she went to use the bathroom.  Following this episode of left sided weakness, she went to the [facility] ED, by the time she arrived her symptoms had entirely resolved.    Prior to this event she was simply on aspirin, she has since then been started on Plavix.  She denies any traumatic events involving this.  Since that episode she has had no repeat symptoms similar to that.  She reports at baseline history of headaches, none of which have worsened over the last few days.  She denies nausea, vomiting, visual changes.    Review of Systems   Eyes: Negative for visual disturbance.   Respiratory: Negative for cough, chest tightness and shortness of breath.    Gastrointestinal: Negative for nausea and vomiting.   Musculoskeletal: Negative for gait  problem, neck pain and neck stiffness.   Neurological: Negative for dizziness, facial asymmetry, speech difficulty, weakness, light-headedness, numbness and headaches.       History  Past Medical History:   Diagnosis Date   • Arthritis    • Back pain    • Carotid artery disease ([facility])    • Coronary artery disease    • Depression    • GERD (gastroesophageal reflux disease)    • History of alcoholism ([facility])    • Hyperlipidemia    • Hypertension    • Kidney disease     was stage 4 but taking excessive amounts of ibuprofen, f/u with nephrologist at the time, resolved since and was to see nephrologist as needed    • Neck pain    • Spastic colon    • Spinal stenosis    • Stroke ([facility]) 2022   • Wears dentures    • Wears reading eyeglasses        Objective     Vital Signs:  Temp:  [97.6 °F (36.4 °C)-98.5 °F (36.9 °C)] 98.5 °F (36.9 °C)  Heart Rate:  [60-80] 80  Resp:  [16-18] 18  BP: ()/(44-86) 151/74  Body mass index is 26.91 kg/m².    Physical Exam:  Physical Exam  Constitutional:       General: She is not in acute distress.     Appearance: Normal appearance. She is not ill-appearing, toxic-appearing or diaphoretic.   HENT:      Head: Normocephalic and atraumatic.   Eyes:      General: No visual field deficit.  Musculoskeletal:         General: Normal range of motion.      Cervical back: Normal range of motion.      Right lower leg: No edema.      Left lower leg: No edema.      Comments: 5 out of 5 strength in intrinsic hand muscles bilaterally, 5 out of 5 strength with hip flexion bilaterally.   Skin:     General: Skin is warm and dry.   Neurological:      General: No focal deficit present.      Mental Status: She is alert and oriented to person, place, and time.      GCS: GCS eye subscore is 4. GCS verbal subscore is 5. GCS motor subscore is 6.      Cranial Nerves: Cranial nerves 2-12 are intact. No cranial nerve deficit, dysarthria or facial asymmetry.      Sensory: Sensation is intact. No sensory deficit.       Motor: Motor function is intact. No weakness, tremor, atrophy or pronator drift.      Coordination: Coordination is intact. Romberg sign negative. Finger-Nose-Finger Test normal.      Comments: No signs of motor drift in upper extremities bilaterally.     Psychiatric:         Mood and Affect: Mood normal.         Behavior: Behavior normal.         Thought Content: Thought content normal.         Judgment: Judgment normal.       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        Lab 11/01/22  0634   HEMOGLOBIN A1C 5.20     Tobacco Use: Medium Risk   • Smoking Tobacco Use: Former   • Smokeless Tobacco Use: Never   • Passive Exposure: Not on file     Body mass index is 26.91 kg/m².    .Assessment & Plan   Diagnosis:  (I63.9) Cerebrovascular accident (CVA), unspecified mechanism (HCC)    (R29.898) Left leg weakness    Medical Decision Making:  Case was reviewed with Dr. Shaggy Concepcion, tentative plan is to keep the patient inpatient, make her n.p.o. tomorrow at midnight, and have her undergo an angiogram with potential stenting of the right vertebral artery.  Dr. Concepcion will swing by either today or tomorrow to discuss this with the patient.  Patient will continue their Plavix and aspirin in the interim    I discussed the patients findings and my recommendations with patient, nursing staff and Dr. Shaggy Lawrence PA-C  11/01/22  13:06 EDT

## 2022-11-01 NOTE — THERAPY EVALUATION
Acute Care - Speech Language Pathology Initial Evaluation  [facility]   Cognitive-Communication Evaluation       Patient Name: Alysia Aguayo  : 1945  MRN: [id number]  Today's Date: 2022               Admit Date: 10/31/2022     Visit Dx:    ICD-10-CM ICD-9-CM   1. Cerebrovascular accident (CVA), unspecified mechanism ([facility])  I63.9 434.91   2. Left leg weakness  R29.898 729.89   3. Stroke-like symptoms  R29.90 781.99     Patient Active Problem List   Diagnosis   • Cervical spondylosis without myelopathy   • Myofascial pain   • Frequent headaches   • History of alcohol abuse   • CAD s/p stent placement   • History of lumbar laminectomy for spinal cord decompression   • Occipital neuralgia of left side   • Lumbar stenosis with neurogenic claudication   • DDD (degenerative disc disease), lumbar   • Anxiety and Depression   • Left arm numbness   • Essential hypertension   • Mixed hyperlipidemia   • Stroke-like symptoms   • Cerebrovascular accident (CVA), unspecified mechanism ([facility])     Past Medical History:   Diagnosis Date   • Arthritis    • Back pain    • Carotid artery disease ([facility])    • Coronary artery disease    • Depression    • GERD (gastroesophageal reflux disease)    • History of alcoholism ([facility])    • Hyperlipidemia    • Hypertension    • Kidney disease     was stage 4 but taking excessive amounts of ibuprofen, f/u with nephrologist at the time, resolved since and was to see nephrologist as needed    • Neck pain    • Spastic colon    • Spinal stenosis    • Stroke ([facility])    • Wears dentures    • Wears reading eyeglasses      Past Surgical History:   Procedure Laterality Date   • CARDIAC CATHETERIZATION  2010    x2    • CATARACT EXTRACTION Bilateral    • CHOLECYSTECTOMY     • COLONOSCOPY  2016   • CORONARY STENT PLACEMENT  2010    x3    • ESOPHAGEAL DILATATION     • HYSTERECTOMY     • LUMBAR DISCECTOMY  07/10/2014    Rt- L1/2 Dr. Dionicio Pollard    • LUMBAR LAMINECTOMY DISCECTOMY DECOMPRESSION  N/A 10/17/2019    Procedure: LUMBAR LAMINECTOMY L3-5;  Surgeon: Dionicio Pollard MD;  Location: [facility] OR;  Service: Neurosurgery   • TUBAL ABDOMINAL LIGATION     • UPPER GASTROINTESTINAL ENDOSCOPY         SLP Recommendation and Plan  SLP Diagnosis: functional speech/language skills, functional cognitive-linguistic skills (11/01/22 1435)  SLP Diagnosis Comments: Speech, language, and cognitive communication abilities are grossly functional per this eval. No acute needs at this time, SLP will sign off. Please reconsult if further concerns arise (11/01/22 1435)        [facility] Criteria for Skilled Therapy Interventions Met: no problems identified which require skilled intervention (11/01/22 1435)  Anticipated Discharge Disposition (SLP): unknown (11/01/22 1435)                                       SLP EVALUATION (last 72 hours)     SLP [facility] Evaluation     Row Name 11/01/22 1435                   Communication Assessment/Intervention    Document Type evaluation  -        Subjective Information no complaints  -        Patient Observations alert;cooperative  -        Patient/Family/Caregiver Comments/Observations daughter present  -        Patient Effort good  -        Symptoms Noted During/After Treatment none  -           General Information    Patient Profile Reviewed yes  -        Pertinent History Of Current Problem Adm with c/o left lower extremity weakness and word finding difficulty. Hx: R MCA, tobacco abuse, alcohol abuse, spastic colon. MRI: no evidence of acute infarct  -        Precautions/Limitations, Vision WFL;for purposes of eval  -        Precautions/Limitations, Hearing WFL;for purposes of eval  -        Prior Level of Function-Communication unknown  -        Plans/Goals Discussed with patient;family;agreed upon  -        Barriers to Rehab none identified  -        Patient's Goals for Discharge patient did not state  -        Family Goals for Discharge family did not state  -            Pain    Additional Documentation Pain Scale: FACES Pre/Post-Treatment (Group)  -           Pain Scale: FACES Pre/Post-Treatment    Pain: FACES Scale, Pretreatment 0-->no hurt  -        Posttreatment Pain Rating 0-->no hurt  -           Comprehension Assessment/Intervention    Comprehension Assessment/Intervention Auditory Comprehension;Reading Comprehension  -           Auditory Comprehension Assessment/Intervention    Auditory Comprehension (Communication) WFL  -           Reading Comprehension Assessment/Intervention    Reading Comprehension (Communication) WFL  -           Expression Assessment/Intervention    Expression Assessment/Intervention verbal expression;graphic expression  -           Verbal Expression Assessment/Intervention    Verbal Expression WFL  -           Graphic Expression Assessment/Intervention    Graphic Expression [facility]  -           Oral Motor Structure and Function    Oral Motor Structure and Function WF  -           Motor Speech Assessment/Intervention    Motor Speech Function [facility]  -           Cognitive Assessment Intervention- SLP    Cognitive Function (Cognition) [facility]  -           SLP Evaluation Clinical Impressions    SLP Diagnosis functional speech/language skills;functional cognitive-linguistic skills  -        SLP Diagnosis Comments Speech, language, and cognitive communication abilities are grossly functional per this eval. No acute needs at this time, SLP will sign off. Please reconsult if further concerns arise  -        Rehab Potential/Prognosis good  -[facility] Criteria for Skilled Therapy Interventions Met no problems identified which require skilled intervention  -        Functional Impact no impact on function  -           Recommendations    Therapy Frequency ([facility]) evaluation only  -        Anticipated Discharge Disposition (SLP) unknown  -              User Key  (r) = Recorded By, (t) = Taken By, (c) = Cosigned By    Initials  Name Effective Dates     Deana Becker MS, CFY-SLP 06/22/22 -                    EDUCATION  The patient has been educated in the following areas:     Cognitive Impairment Communication Impairment.                      Time Calculation:      Time Calculation- SLP     Row Name 11/01/22 1454             Time Calculation- SLP    SLP Start Time 1435  -      SLP Received On 11/01/22  -         Untimed Charges    88872-ER Eval Speech and Production w/ Language Minutes 45  -MH         Total Minutes    Untimed Charges Total Minutes 45  -MH       Total Minutes 45  -MH            User Key  (r) = Recorded By, (t) = Taken By, (c) = Cosigned By    Initials Name Provider Type     Deana Becker, MS, CFY-SLP Speech and Language Pathologist                Therapy Charges for Today     Code Description Service Date Service Provider Modifiers Qty    [id number] HC ST EVAL SPEECH AND PROD W LANG  3 11/1/2022 Deana Becker MS, CFY-SLP GN 1                Patient was not wearing a face mask and did not exhibit coughing during this therapy encounter.  Procedure performed was not aerosolizing, did not involve close contact (within 6 feet for at least 15 minutes or longer), and did not involve contact with infectious secretions or specimens.  Therapist used appropriate personal protective equipment including gloves, standard procedure mask and eye protection.  Appropriate PPE was worn during the entire therapy session.  Hand hygiene was completed before and after therapy session.         Deana Becker MS, ROCIO-SLP  11/1/2022

## 2022-11-01 NOTE — CASE MANAGEMENT/SOCIAL WORK
Discharge Planning Assessment  [facility]     Patient Name: Alysia Aguayo  MRN: [id number]  Today's Date: 11/1/2022    Admit Date: 10/31/2022    Plan: home   Discharge Needs Assessment     Row Name 11/01/22 1420       Living Environment    People in Home child(holly), adult    Current Living Arrangements home    Primary Care Provided by self    Able to Return to Prior Arrangements yes       Transition Planning    Patient/Family Anticipates Transition to home    Transportation Anticipated family or friend will provide       Discharge Needs Assessment    Equipment Currently Used at Home none    Concerns to be Addressed denies needs/concerns at this time;no discharge needs identified               Discharge Plan     Row Name 11/01/22 1421       Plan    Plan home    Plan Comments I met with Mrs. Aguayo at the bedside. She lives in [facility] with her daughter. She ambulates independently at home and is independent with all activities of daily living. She does not drive. Her daughter transports her by car when she leaves her home. She anticipates returning home after this admission. She denies needs for home health or outpatient therapy services. Her daughter will transport her home at the time of discharge.    Final Discharge Disposition Code 01 - home or self-care    Row Name 11/01/22 8940       Plan    Final Discharge Disposition Code 01 - home or self-care              Continued Care and Services - Admitted Since 10/31/2022    Coordination has not been started for this encounter.       Expected Discharge Date and Time     Expected Discharge Date Expected Discharge Time    Nov 3, 2022          Demographic Summary     Row Name 11/01/22 2561       General Information    General Information Comments I confirmed that Yesica Zhu is Mrs. Aguayo's PCP. She has insurance through Medicare and [employer].               Functional Status     Row Name 11/01/22 1420       Functional Status, IADL    Medications independent                Psychosocial    No documentation.                Abuse/Neglect    No documentation.                Legal    No documentation.                Substance Abuse    No documentation.                Patient Forms    No documentation.                   Bo Cooney RN

## 2022-11-02 PROCEDURE — 99232 SBSQ HOSP IP/OBS MODERATE 35: CPT | Performed by: INTERNAL MEDICINE

## 2022-11-02 PROCEDURE — 25010000002 ENOXAPARIN PER 10 MG: Performed by: INTERNAL MEDICINE

## 2022-11-02 RX ADMIN — CLOPIDOGREL BISULFATE 75 MG: 75 TABLET ORAL at 07:59

## 2022-11-02 RX ADMIN — ENOXAPARIN SODIUM 40 MG: 40 INJECTION SUBCUTANEOUS at 07:59

## 2022-11-02 RX ADMIN — Medication 10 ML: at 21:05

## 2022-11-02 RX ADMIN — FLUOXETINE 40 MG: 20 CAPSULE ORAL at 21:04

## 2022-11-02 RX ADMIN — ACETAMINOPHEN 650 MG: 325 TABLET, FILM COATED ORAL at 11:47

## 2022-11-02 RX ADMIN — ATORVASTATIN CALCIUM 80 MG: 40 TABLET, FILM COATED ORAL at 21:04

## 2022-11-02 RX ADMIN — ASPIRIN 81 MG CHEWABLE TABLET 81 MG: 81 TABLET CHEWABLE at 07:59

## 2022-11-02 NOTE — CASE MANAGEMENT/SOCIAL WORK
Continued Stay Note  [facility]     Patient Name: Alysia Aguayo  MRN: [id number]  Today's Date: 11/2/2022    Admit Date: 10/31/2022    Plan: home   Discharge Plan     Row Name 11/02/22 1103       Plan    Plan Comments Plan is for an angiogram in the AM and then for pt to discharge home. No discharge needs at this time.    Final Discharge Disposition Code 01 - home or self-care               Discharge Codes    No documentation.               Expected Discharge Date and Time     Expected Discharge Date Expected Discharge Time    Nov 3, 2022             Page Hein RN

## 2022-11-02 NOTE — PROGRESS NOTES
[facility] Medicine Services  PROGRESS NOTE    Patient Name: Alysia Aguayo  : 1945  MRN: [id number]    Date of Admission: 10/31/2022  Primary Care Physician: Yesica Zhu PA-C    Subjective   Subjective     CC:  weakness    HPI:  No new issues overnight, got some rest last night.     ROS:  Gen- No fevers, chills  CV- No chest pain, palpitations  Resp- No cough, dyspnea  GI- No N/V/D, abd pain        Objective   Objective     Vital Signs:   Temp:  [98.1 °F (36.7 °C)-98.7 °F (37.1 °C)] 98.1 °F (36.7 °C)  Heart Rate:  [63-80] 63  Resp:  [16-18] 16  BP: (108-151)/(71-93) 124/93     Physical Exam:  Constitutional: No acute distress, awake, alert  HENT: NCAT, mucous membranes moist  Respiratory: Clear to auscultation bilaterally, respiratory effort normal   Cardiovascular: RRR, no murmurs, rubs, or gallops  Gastrointestinal: Positive bowel sounds, soft, nontender, nondistended  Musculoskeletal: No bilateral ankle edema  Psychiatric: Appropriate affect, cooperative  Neurologic: Oriented x 3, strength symmetric in all extremities, Cranial Nerves grossly intact to confrontation, speech clear  Skin: No rashes    Results Reviewed:  LAB RESULTS:      Lab 10/31/22  1449 10/31/22  144   WBC  --  8.57   HEMOGLOBIN  --  13.0   HEMOGLOBIN, POC  --  14.3   HEMATOCRIT  --  41.8   HEMATOCRIT POC  --  42   PLATELETS  --  253   NEUTROS ABS  --  5.57   IMMATURE GRANS (ABS)  --  0.02   LYMPHS ABS  --  2.13   MONOS ABS  --  0.68   EOS ABS  --  0.12   MCV  --  101.5*   PROTIME 12.8  --    APTT  --  28.9         Lab 22  0634 10/31/22  144   CREATININE  --  1.20   EGFR  --  47.0*   HEMOGLOBIN A1C 5.20  --          Lab 10/31/22  1445   ALT (SGPT) 14   AST (SGOT) 21         Lab 10/31/22  1449 10/31/22  1445   TROPONIN T  --  <0.010   PROTIME 12.8  --    INR 1.1  --          Lab 22  0634   CHOLESTEROL 133   LDL CHOL 59   HDL CHOL 56   TRIGLYCERIDES 96         Lab 22  0634   FOLATE  8.73   VITAMIN B 12 385         Brief Urine Lab Results     None          Microbiology Results Abnormal     None          MRI Angiogram Head Without Contrast    Result Date: 11/1/2022  DATE OF EXAM: 10/31/2022 8:10 PM  PROCEDURE: MRI ANGIOGRAM NECK WO CONTRAST-, MRI ANGIOGRAM HEAD WO CONTRAST-, MRI BRAIN WO CONTRAST-  INDICATIONS: Stroke, follow up; I63.9-Cerebral infarction, unspecified; R29.898-Other symptoms and signs involving the musculoskeletal system  COMPARISON: CT head 1 day prior, CT angiogram 6/27/2022  TECHNIQUE: Multiplanar multisequence MRI of the brain performed without IV contrast. Noncontrast time-of-flight 3-dimensional MR angiogram of the head and neck with three-dimensional maximum intensity projections postprocessed  Stenosis measurement was performed by the NASCET or similar method.  FINDINGS: MRI brain: No acute infarct on diffusion weighted sequences. Midline structures are normal and the craniocervical junction is satisfactory in appearance. Gray-white differentiation is maintained, with minimal typical T2 hyperintense periventricular white matter changes present, favored to reflect sequela of mild chronic small vessel ischemia. There is otherwise no evidence of intracranial hemorrhage, mass or mass effect. The ventricles are normal in size and configuration. The orbits are normal and the paranasal sinuses are grossly clear. Intracranial arterial flow voids are maintained.  MR angiogram: MR angiogram of the neck demonstrates expected flow related signal within the visualized common carotid arteries bilaterally. There is 0% narrowing of the ICA origins bilaterally by NASCET criteria. Normal course and caliber of the left vertebral artery. There is absence of flow related signal seen within the right vertebral artery, somewhat nonspecific for age-indeterminate occlusion versus reversal of flow. Intracranially, the carotid siphons demonstrate expected flow related signal. The anterior cerebral  arteries are normal in course and caliber bilaterally. The middle cerebral arteries demonstrate no evidence of flow-limiting stenosis, large vessel occlusion or aneurysmal dilatation. The vertebrobasilar system is patent. The posterior cerebral arteries are normal in course and caliber bilaterally.      Impression: Essentially normal noncontrast MRI of the brain for patient age. There is no evidence of acute infarct, hemorrhage, mass or mass effect.  MR angiogram of the neck demonstrates lack of flow related signal within the right vertebral artery. This finding is somewhat nonspecific. While occlusion remains a consideration, this vessel was patent on fairly recent CT angiogram and interval occlusion would likely have some resultant ischemic effects intracranially. This finding on MR angiogram can also be seen with reversal of flow, such as with subclavian steal. If there is persistent clinical concern, CT angiogram of the neck versus ultrasound could be performed to document right vertebral artery flow.  MR angiogram of the head and neck is otherwise essentially normal without evidence of additional flow-limiting stenosis, large vessel occlusion or aneurysmal dilatation.  This report was finalized on 11/1/2022 9:25 AM by Naseem Lees.      MRI Angiogram Neck Without Contrast    Result Date: 11/1/2022  DATE OF EXAM: 10/31/2022 8:10 PM  PROCEDURE: MRI ANGIOGRAM NECK WO CONTRAST-, MRI ANGIOGRAM HEAD WO CONTRAST-, MRI BRAIN WO CONTRAST-  INDICATIONS: Stroke, follow up; I63.9-Cerebral infarction, unspecified; R29.898-Other symptoms and signs involving the musculoskeletal system  COMPARISON: CT head 1 day prior, CT angiogram 6/27/2022  TECHNIQUE: Multiplanar multisequence MRI of the brain performed without IV contrast. Noncontrast time-of-flight 3-dimensional MR angiogram of the head and neck with three-dimensional maximum intensity projections postprocessed  Stenosis measurement was performed by the NASCET or  similar method.  FINDINGS: MRI brain: No acute infarct on diffusion weighted sequences. Midline structures are normal and the craniocervical junction is satisfactory in appearance. Gray-white differentiation is maintained, with minimal typical T2 hyperintense periventricular white matter changes present, favored to reflect sequela of mild chronic small vessel ischemia. There is otherwise no evidence of intracranial hemorrhage, mass or mass effect. The ventricles are normal in size and configuration. The orbits are normal and the paranasal sinuses are grossly clear. Intracranial arterial flow voids are maintained.  MR angiogram: MR angiogram of the neck demonstrates expected flow related signal within the visualized common carotid arteries bilaterally. There is 0% narrowing of the ICA origins bilaterally by NASCET criteria. Normal course and caliber of the left vertebral artery. There is absence of flow related signal seen within the right vertebral artery, somewhat nonspecific for age-indeterminate occlusion versus reversal of flow. Intracranially, the carotid siphons demonstrate expected flow related signal. The anterior cerebral arteries are normal in course and caliber bilaterally. The middle cerebral arteries demonstrate no evidence of flow-limiting stenosis, large vessel occlusion or aneurysmal dilatation. The vertebrobasilar system is patent. The posterior cerebral arteries are normal in course and caliber bilaterally.      Impression: Essentially normal noncontrast MRI of the brain for patient age. There is no evidence of acute infarct, hemorrhage, mass or mass effect.  MR angiogram of the neck demonstrates lack of flow related signal within the right vertebral artery. This finding is somewhat nonspecific. While occlusion remains a consideration, this vessel was patent on fairly recent CT angiogram and interval occlusion would likely have some resultant ischemic effects intracranially. This finding on MR  angiogram can also be seen with reversal of flow, such as with subclavian steal. If there is persistent clinical concern, CT angiogram of the neck versus ultrasound could be performed to document right vertebral artery flow.  MR angiogram of the head and neck is otherwise essentially normal without evidence of additional flow-limiting stenosis, large vessel occlusion or aneurysmal dilatation.  This report was finalized on 11/1/2022 9:25 AM by Naseem Lees.      MRI Brain Without Contrast    Result Date: 11/1/2022  DATE OF EXAM: 10/31/2022 8:10 PM  PROCEDURE: MRI ANGIOGRAM NECK WO CONTRAST-, MRI ANGIOGRAM HEAD WO CONTRAST-, MRI BRAIN WO CONTRAST-  INDICATIONS: Stroke, follow up; I63.9-Cerebral infarction, unspecified; R29.898-Other symptoms and signs involving the musculoskeletal system  COMPARISON: CT head 1 day prior, CT angiogram 6/27/2022  TECHNIQUE: Multiplanar multisequence MRI of the brain performed without IV contrast. Noncontrast time-of-flight 3-dimensional MR angiogram of the head and neck with three-dimensional maximum intensity projections postprocessed  Stenosis measurement was performed by the NASCET or similar method.  FINDINGS: MRI brain: No acute infarct on diffusion weighted sequences. Midline structures are normal and the craniocervical junction is satisfactory in appearance. Gray-white differentiation is maintained, with minimal typical T2 hyperintense periventricular white matter changes present, favored to reflect sequela of mild chronic small vessel ischemia. There is otherwise no evidence of intracranial hemorrhage, mass or mass effect. The ventricles are normal in size and configuration. The orbits are normal and the paranasal sinuses are grossly clear. Intracranial arterial flow voids are maintained.  MR angiogram: MR angiogram of the neck demonstrates expected flow related signal within the visualized common carotid arteries bilaterally. There is 0% narrowing of the ICA origins  bilaterally by NASCET criteria. Normal course and caliber of the left vertebral artery. There is absence of flow related signal seen within the right vertebral artery, somewhat nonspecific for age-indeterminate occlusion versus reversal of flow. Intracranially, the carotid siphons demonstrate expected flow related signal. The anterior cerebral arteries are normal in course and caliber bilaterally. The middle cerebral arteries demonstrate no evidence of flow-limiting stenosis, large vessel occlusion or aneurysmal dilatation. The vertebrobasilar system is patent. The posterior cerebral arteries are normal in course and caliber bilaterally.      Impression: Essentially normal noncontrast MRI of the brain for patient age. There is no evidence of acute infarct, hemorrhage, mass or mass effect.  MR angiogram of the neck demonstrates lack of flow related signal within the right vertebral artery. This finding is somewhat nonspecific. While occlusion remains a consideration, this vessel was patent on fairly recent CT angiogram and interval occlusion would likely have some resultant ischemic effects intracranially. This finding on MR angiogram can also be seen with reversal of flow, such as with subclavian steal. If there is persistent clinical concern, CT angiogram of the neck versus ultrasound could be performed to document right vertebral artery flow.  MR angiogram of the head and neck is otherwise essentially normal without evidence of additional flow-limiting stenosis, large vessel occlusion or aneurysmal dilatation.  This report was finalized on 11/1/2022 9:25 AM by Naseem Lees.      XR Chest 1 View    Result Date: 10/31/2022  DATE OF EXAM: 10/31/2022 3:09 PM  PROCEDURE: XR CHEST 1 VW-  INDICATIONS: Acute cerebrovascular accident.  COMPARISON: 6/27/2022.  TECHNIQUE: Single radiographic view of the chest was obtained.  FINDINGS: The heart size is normal. The pulmonary vascular markings are normal. The lungs and  pleural spaces are clear of active disease.  There are chronic age-related changes involving the bony thorax and thoracic aorta.      Impression: No active disease.  This report was finalized on 10/31/2022 4:13 PM by Farhat Helms MD.      Duplex Carotid Ultrasound CAR    Result Date: 11/1/2022  •  Right internal carotid artery stenosis of 0-49%. •  Left internal carotid artery stenosis of 0-49%. •  Retrograde flow right vertebral artery suggestive of right subclavian artery stenosis or innominate artery stenosis     CT Head Without Contrast Stroke Protocol    Result Date: 10/31/2022   DATE OF EXAM: 10/31/2022 2:39 PM  PROCEDURE: CT HEAD WO CONTRAST STROKE PROTOCOL-  INDICATIONS: Speech difficulty, left leg heaviness.  COMPARISON: 6/27/2022.  TECHNIQUE: Routine transaxial cuts were obtained through the head without the administration of contrast. Automated exposure control and iterative reconstruction methods were used.  FINDINGS: There is mild volume loss with prominence of the fissures in the sulci of the frontal lobes. The ventricles and basal cisterns are well-maintained. There are subtle areas of decreased density in the periventricular white matter felt to represent chronic microvascular ischemia. There is no acute hemorrhage, midline shift, or suspicious extra-axial fluid collections. There is thinning of the lenses suggestive of previous cataract surgery. The visualized paranasal and mastoid sinuses are clear. The calvarium is unremarkable.      Impression: IMPRESSION :  1. No acute findings. 2. Mild volume loss secondary to bilateral frontal cortical atrophy. 3. Mild chronic periventricular white matter microvascular ischemia. 4. The results were discussed with the stroke coordinator at 1442 hours.  This report was finalized on 10/31/2022 2:49 PM by Farhat Helms MD.      CT CEREBRAL PERFUSION WITH & WITHOUT CONTRAST    Result Date: 10/31/2022  DATE OF EXAM: 10/31/2022 2:42 PM  PROCEDURE: CT CEREBRAL PERFUSION  W WO CONTRAST-  INDICATIONS: Acute neurologic deficit, slurred speech, left leg heaviness.  COMPARISON: No comparisons available.  TECHNIQUE: Routine transaxial cuts were obtained through the head without administration of contrast. Routine transaxial cuts were then obtained through the head following the intravenous administration of 40 mL of Isovue 370. Core blood volume, core blood flow, mean transit time, and Tmax images were obtained utilizing the Rapid software protocol. A limited CT angiogram of the head was also performed to measure the blood vessel density.  The radiation dose reduction device was turned on for each scan per the ALARA (As Low as Reasonably Achievable) protocol.  FINDINGS: There is no ischemic core. There is no ischemic penumbra. The mismatch volume is 0 cc. The hypoperfusion index is not applicable.      Impression: No CT cerebral perfusion abnormality.  This report was finalized on 10/31/2022 2:54 PM by Farhat Helms MD.        Results for orders placed during the hospital encounter of 06/27/22    Adult Transthoracic Echo Complete W/ Cont if Necessary Per Protocol (With Agitated Saline)    Interpretation Summary  · Saline test results are negative.  · Estimated left ventricular EF = 60% Left ventricular systolic function is normal.      I have reviewed the medications:  Scheduled Meds:aspirin, 81 mg, Oral, Daily   Or  aspirin, 300 mg, Rectal, Daily  atorvastatin, 80 mg, Oral, Nightly  clopidogrel, 75 mg, Oral, Daily  enoxaparin, 40 mg, Subcutaneous, Daily  FLUoxetine, 40 mg, Oral, Daily  sodium chloride, 10 mL, Intravenous, Q12H      Continuous Infusions:   PRN Meds:.•  acetaminophen  •  sodium chloride  •  sodium chloride    Assessment & Plan   Assessment & Plan     Active Hospital Problems    Diagnosis  POA   • **Cerebrovascular accident (CVA), unspecified mechanism (HCC) [I63.9]  Yes   • Stroke-like symptoms [R29.90]  Unknown   • CAD s/p stent placement [Z95.5]  Not Applicable   •  Essential hypertension [I10]  Yes   • Mixed hyperlipidemia [E78.2]  Yes      Resolved Hospital Problems   No resolved problems to display.        Brief Hospital Course to date:  Alysia Aguayo is a 77 y.o. female with history of  CAD s/p stent placement in 2010, HTN, HLD, CKD, and R MCA CVA in 6/2022 and intermittent L side weakness since then, presented to ED after noticing L side weakness and trouble getting words out.  Back to baseline by time of arrival.     L side weakness/expressive aphasia, transient  -- suspect recrudescent symptoms associated with intermittent hypotension   -- BP fell to 97/62 in ED  -- stroke team following; MRI/MRA as below  -- continue ASA Plavix and high intensity statin   -- P2Y12 c/w pt NOT taking Plavix (she reported that she was no longer taking this).    -- found to have signal drop out in R vertebral artery on MRA H/N. NSGY consulted and plans for angiogram in am with possible stenting of vertebral artery. NPO after MN     HTN  - states she takes amlodipine and lisinopril 40mg x a long time  -- held on admission due to concern for CVA, continue home meds as needed (of note, Lisinopril was NOT on her medication rec)     Reported irreg beats seen on home BP machine  - NSR here  - consider MCOT as outpatient     CKD stage 3a  - creat 1.2, baseline.       Macrocytosis  -  B12 and folate wnl  -- not anemic      Expected Discharge Location and Transportation: home  Expected Discharge Date: 11/3 or 11/4    DVT prophylaxis:  Medical and mechanical DVT prophylaxis orders are present.     AM-PAC 6 Clicks Score (PT): 23 (11/02/22 0800)    CODE STATUS:   Code Status and Medical Interventions:   Ordered at: 11/01/22 0442     Level Of Support Discussed With:    Patient     Code Status (Patient has no pulse and is not breathing):    CPR (Attempt to Resuscitate)     Medical Interventions (Patient has pulse or is breathing):    Full Support     Release to patient:    Routine Release        Veronika Becker MD  11/02/22

## 2022-11-03 PROBLEM — Z86.73 H/O: CVA (CEREBROVASCULAR ACCIDENT): Status: ACTIVE | Noted: 2022-11-03

## 2022-11-03 LAB
ANION GAP SERPL CALCULATED.3IONS-SCNC: 11 MMOL/L (ref 5–15)
BASOPHILS # BLD AUTO: 0.04 10*3/MM3 (ref 0–0.2)
BASOPHILS NFR BLD AUTO: 0.6 % (ref 0–1.5)
BUN SERPL-MCNC: 20 MG/DL (ref 8–23)
BUN/CREAT SERPL: 20.4 (ref 7–25)
CALCIUM SPEC-SCNC: 9.5 MG/DL (ref 8.6–10.5)
CHLORIDE SERPL-SCNC: 107 MMOL/L (ref 98–107)
CO2 SERPL-SCNC: 19 MMOL/L (ref 22–29)
CREAT SERPL-MCNC: 0.98 MG/DL (ref 0.57–1)
DEPRECATED RDW RBC AUTO: 47.9 FL (ref 37–54)
EGFRCR SERPLBLD CKD-EPI 2021: 59.9 ML/MIN/1.73
EOSINOPHIL # BLD AUTO: 0.14 10*3/MM3 (ref 0–0.4)
EOSINOPHIL NFR BLD AUTO: 2.2 % (ref 0.3–6.2)
ERYTHROCYTE [DISTWIDTH] IN BLOOD BY AUTOMATED COUNT: 13.5 % (ref 12.3–15.4)
GLUCOSE SERPL-MCNC: 107 MG/DL (ref 65–99)
HCT VFR BLD AUTO: 40 % (ref 34–46.6)
HGB BLD-MCNC: 13.1 G/DL (ref 12–15.9)
IMM GRANULOCYTES # BLD AUTO: 0.01 10*3/MM3 (ref 0–0.05)
IMM GRANULOCYTES NFR BLD AUTO: 0.2 % (ref 0–0.5)
LYMPHOCYTES # BLD AUTO: 1.93 10*3/MM3 (ref 0.7–3.1)
LYMPHOCYTES NFR BLD AUTO: 30.1 % (ref 19.6–45.3)
MCH RBC QN AUTO: 31.6 PG (ref 26.6–33)
MCHC RBC AUTO-ENTMCNC: 32.8 G/DL (ref 31.5–35.7)
MCV RBC AUTO: 96.4 FL (ref 79–97)
MONOCYTES # BLD AUTO: 0.62 10*3/MM3 (ref 0.1–0.9)
MONOCYTES NFR BLD AUTO: 9.7 % (ref 5–12)
NEUTROPHILS NFR BLD AUTO: 3.67 10*3/MM3 (ref 1.7–7)
NEUTROPHILS NFR BLD AUTO: 57.2 % (ref 42.7–76)
NRBC BLD AUTO-RTO: 0 /100 WBC (ref 0–0.2)
PLATELET # BLD AUTO: 239 10*3/MM3 (ref 140–450)
PMV BLD AUTO: 9.4 FL (ref 6–12)
POTASSIUM SERPL-SCNC: 4.5 MMOL/L (ref 3.5–5.2)
RBC # BLD AUTO: 4.15 10*6/MM3 (ref 3.77–5.28)
SODIUM SERPL-SCNC: 137 MMOL/L (ref 136–145)
WBC NRBC COR # BLD: 6.41 10*3/MM3 (ref 3.4–10.8)

## 2022-11-03 PROCEDURE — C1769 GUIDE WIRE: HCPCS | Performed by: NEUROLOGICAL SURGERY

## 2022-11-03 PROCEDURE — B313YZZ FLUOROSCOPY OF RIGHT COMMON CAROTID ARTERY USING OTHER CONTRAST: ICD-10-PCS | Performed by: NEUROLOGICAL SURGERY

## 2022-11-03 PROCEDURE — 99232 SBSQ HOSP IP/OBS MODERATE 35: CPT | Performed by: NURSE PRACTITIONER

## 2022-11-03 PROCEDURE — C1894 INTRO/SHEATH, NON-LASER: HCPCS | Performed by: NEUROLOGICAL SURGERY

## 2022-11-03 PROCEDURE — 99152 MOD SED SAME PHYS/QHP 5/>YRS: CPT | Performed by: NEUROLOGICAL SURGERY

## 2022-11-03 PROCEDURE — 25010000002 ENOXAPARIN PER 10 MG: Performed by: INTERNAL MEDICINE

## 2022-11-03 PROCEDURE — 37218 STENT PLACEMT ANTE CAROTID: CPT | Performed by: NEUROLOGICAL SURGERY

## 2022-11-03 PROCEDURE — 99153 MOD SED SAME PHYS/QHP EA: CPT | Performed by: NEUROLOGICAL SURGERY

## 2022-11-03 PROCEDURE — 99232 SBSQ HOSP IP/OBS MODERATE 35: CPT | Performed by: INTERNAL MEDICINE

## 2022-11-03 PROCEDURE — C1760 CLOSURE DEV, VASC: HCPCS | Performed by: NEUROLOGICAL SURGERY

## 2022-11-03 PROCEDURE — 37218 STENT PLACEMT ANTE CAROTID: CPT | Performed by: RADIOLOGY

## 2022-11-03 PROCEDURE — 36226 PLACE CATH VERTEBRAL ART: CPT | Performed by: NEUROLOGICAL SURGERY

## 2022-11-03 PROCEDURE — 25010000002 FENTANYL CITRATE (PF) 50 MCG/ML SOLUTION: Performed by: NEUROLOGICAL SURGERY

## 2022-11-03 PROCEDURE — 0 IODIXANOL PER 1 ML: Performed by: NEUROLOGICAL SURGERY

## 2022-11-03 PROCEDURE — 85025 COMPLETE CBC W/AUTO DIFF WBC: CPT | Performed by: INTERNAL MEDICINE

## 2022-11-03 PROCEDURE — 80048 BASIC METABOLIC PNL TOTAL CA: CPT | Performed by: INTERNAL MEDICINE

## 2022-11-03 PROCEDURE — 25010000002 MIDAZOLAM PER 1 MG: Performed by: NEUROLOGICAL SURGERY

## 2022-11-03 PROCEDURE — C1876 STENT, NON-COA/NON-COV W/DEL: HCPCS | Performed by: NEUROLOGICAL SURGERY

## 2022-11-03 PROCEDURE — 25010000002 HEPARIN (PORCINE) PER 1000 UNITS: Performed by: NEUROLOGICAL SURGERY

## 2022-11-03 PROCEDURE — B312YZZ FLUOROSCOPY OF LEFT SUBCLAVIAN ARTERY USING OTHER CONTRAST: ICD-10-PCS | Performed by: NEUROLOGICAL SURGERY

## 2022-11-03 PROCEDURE — 03723DZ DILATION OF INNOMINATE ARTERY WITH INTRALUMINAL DEVICE, PERCUTANEOUS APPROACH: ICD-10-PCS | Performed by: NEUROLOGICAL SURGERY

## 2022-11-03 PROCEDURE — B31GYZZ FLUOROSCOPY OF BILATERAL VERTEBRAL ARTERIES USING OTHER CONTRAST: ICD-10-PCS | Performed by: NEUROLOGICAL SURGERY

## 2022-11-03 PROCEDURE — 36224 PLACE CATH CAROTD ART: CPT | Performed by: NEUROLOGICAL SURGERY

## 2022-11-03 DEVICE — IMPLANTABLE DEVICE: Type: IMPLANTABLE DEVICE | Status: FUNCTIONAL

## 2022-11-03 RX ORDER — HEPARIN SODIUM 1000 [USP'U]/ML
INJECTION, SOLUTION INTRAVENOUS; SUBCUTANEOUS
Status: DISCONTINUED | OUTPATIENT
Start: 2022-11-03 | End: 2022-11-03 | Stop reason: HOSPADM

## 2022-11-03 RX ORDER — CHOLECALCIFEROL (VITAMIN D3) 125 MCG
5 CAPSULE ORAL NIGHTLY PRN
Status: DISCONTINUED | OUTPATIENT
Start: 2022-11-03 | End: 2022-11-04 | Stop reason: HOSPADM

## 2022-11-03 RX ORDER — IODIXANOL 320 MG/ML
INJECTION, SOLUTION INTRAVASCULAR
Status: DISCONTINUED | OUTPATIENT
Start: 2022-11-03 | End: 2022-11-03 | Stop reason: HOSPADM

## 2022-11-03 RX ORDER — FENTANYL CITRATE 50 UG/ML
INJECTION, SOLUTION INTRAMUSCULAR; INTRAVENOUS
Status: DISCONTINUED | OUTPATIENT
Start: 2022-11-03 | End: 2022-11-03 | Stop reason: HOSPADM

## 2022-11-03 RX ORDER — LIDOCAINE HYDROCHLORIDE 10 MG/ML
INJECTION, SOLUTION EPIDURAL; INFILTRATION; INTRACAUDAL; PERINEURAL
Status: DISCONTINUED | OUTPATIENT
Start: 2022-11-03 | End: 2022-11-03 | Stop reason: HOSPADM

## 2022-11-03 RX ORDER — TRAMADOL HYDROCHLORIDE 50 MG/1
25 TABLET ORAL ONCE
Status: COMPLETED | OUTPATIENT
Start: 2022-11-03 | End: 2022-11-03

## 2022-11-03 RX ORDER — SODIUM CHLORIDE 0.9 % (FLUSH) 0.9 %
10 SYRINGE (ML) INJECTION EVERY 12 HOURS SCHEDULED
Status: DISCONTINUED | OUTPATIENT
Start: 2022-11-03 | End: 2022-11-04 | Stop reason: HOSPADM

## 2022-11-03 RX ORDER — PANTOPRAZOLE SODIUM 40 MG/1
40 TABLET, DELAYED RELEASE ORAL
Status: DISCONTINUED | OUTPATIENT
Start: 2022-11-04 | End: 2022-11-04 | Stop reason: HOSPADM

## 2022-11-03 RX ORDER — MIDAZOLAM HYDROCHLORIDE 1 MG/ML
INJECTION INTRAMUSCULAR; INTRAVENOUS
Status: DISCONTINUED | OUTPATIENT
Start: 2022-11-03 | End: 2022-11-03 | Stop reason: HOSPADM

## 2022-11-03 RX ORDER — SODIUM CHLORIDE 9 MG/ML
75 INJECTION, SOLUTION INTRAVENOUS CONTINUOUS
Status: DISCONTINUED | OUTPATIENT
Start: 2022-11-03 | End: 2022-11-03

## 2022-11-03 RX ORDER — SODIUM CHLORIDE 0.9 % (FLUSH) 0.9 %
10 SYRINGE (ML) INJECTION AS NEEDED
Status: DISCONTINUED | OUTPATIENT
Start: 2022-11-03 | End: 2022-11-04 | Stop reason: HOSPADM

## 2022-11-03 RX ADMIN — Medication 10 ML: at 20:23

## 2022-11-03 RX ADMIN — FLUOXETINE 40 MG: 20 CAPSULE ORAL at 20:17

## 2022-11-03 RX ADMIN — CLOPIDOGREL BISULFATE 75 MG: 75 TABLET ORAL at 08:11

## 2022-11-03 RX ADMIN — ASPIRIN 81 MG CHEWABLE TABLET 81 MG: 81 TABLET CHEWABLE at 08:11

## 2022-11-03 RX ADMIN — Medication 5 MG: at 23:29

## 2022-11-03 RX ADMIN — Medication 10 ML: at 08:10

## 2022-11-03 RX ADMIN — ATORVASTATIN CALCIUM 80 MG: 40 TABLET, FILM COATED ORAL at 20:17

## 2022-11-03 RX ADMIN — ENOXAPARIN SODIUM 40 MG: 40 INJECTION SUBCUTANEOUS at 08:11

## 2022-11-03 RX ADMIN — ACETAMINOPHEN 650 MG: 325 TABLET, FILM COATED ORAL at 18:10

## 2022-11-03 RX ADMIN — ACETAMINOPHEN 650 MG: 325 TABLET, FILM COATED ORAL at 08:10

## 2022-11-03 RX ADMIN — TRAMADOL HYDROCHLORIDE 25 MG: 50 TABLET ORAL at 20:17

## 2022-11-03 NOTE — PROGRESS NOTES
Stroke Progress Note       Chief Complaint:  Worsening Left Sided Weakness    Subjective    Subjective     Subjective:  Sitting up in bed. No current complaints. She has no deficits at this time. Awaiting cath lab for this afternoon. Family is present at the bedside.    Review of Systems   All other systems reviewed and are negative.     Objective    Objective      Temp:  [98 °F (36.7 °C)-98.3 °F (36.8 °C)] 98 °F (36.7 °C)  Heart Rate:  [53-86] 68  Resp:  [16-18] 16  BP: (120-139)/(64-93) 137/65    Neurological Exam  Mental Status  Awake, alert and oriented to person, place and time.Alert. Oriented to person, place, and time. Speech is normal. Language is fluent with no aphasia. Attention and concentration are normal.     Cranial Nerves  CN II: Visual acuity is normal. Visual fields full to confrontation.  CN III, IV, VI: Extraocular movements intact bilaterally. Normal lids and orbits bilaterally. Pupils equal round and reactive to light bilaterally.  CN V: Facial sensation is normal.  CN VII: Full and symmetric facial movement.  CN IX, X: Palate elevates symmetrically  CN XI: Shoulder shrug strength is normal.  CN XII: Tongue midline without atrophy or fasciculations.     Motor   Strength is 5/5 throughout all four extremities.     Sensory  Light touch is normal in upper and lower extremities.      Reflexes                                            Right                      Left  Plantar                           Downgoing                Downgoing     Coordination  Right: Finger-to-nose normal.Left: Finger-to-nose normal.     Gait   Normal gait.Casual gait is normal including stance, stride, and arm swing.        Physical Exam  Constitutional:       Appearance: Normal appearance.   HENT:      Head: Normocephalic and atraumatic.   Eyes:      General: Lids are normal.      Extraocular Movements: Extraocular movements intact.      Pupils: Pupils are equal, round, and reactive to light.   Cardiovascular:      Rate  and Rhythm: Normal rate and regular rhythm.   Pulmonary:      Effort: Pulmonary effort is normal. No respiratory distress.   Abdominal:      General: There is no distension.      Palpations: Abdomen is soft.   Musculoskeletal:         General: Normal range of motion.      Cervical back: Normal range of motion and neck supple.   Skin:     General: Skin is warm and dry.      Capillary Refill: Capillary refill takes less than 2 seconds.   Neurological:      Mental Status: She is alert and oriented to person, place, and time.      Cranial Nerves: No cranial nerve deficit.      Sensory: No sensory deficit.      Motor: Motor strength is normal. No weakness.      Coordination: Coordination normal.      Gait: Gait normal.   Psychiatric:         Mood and Affect: Mood normal.         Speech: Speech normal.          Results Review:    I reviewed the patient's new clinical results.    Lipid Panel    Lipid Panel 5/31/22 11/1/22   Total Cholesterol  133   Total Cholesterol 149    Triglycerides 123 96   HDL Cholesterol 53 56   VLDL Cholesterol 22 18   LDL Cholesterol  74 59   LDL/HDL Ratio  1.03           Hemoglobin A1c 5.2  Hemoglobin 13.1  Hematocrit 40  Platelets 239    Bilateral carotid ultrasound 11-1-2022  Right internal carotid artery stenosis of 0 to 49%  Left internal carotid artery stenosis is 0 to 49%  Retrograde flow in the right vertebral artery suggestive of right subclavian artery stenosis or innominate artery stenosis    DATE OF EXAM: 10/31/2022 8:10 PM     PROCEDURE: MRI ANGIOGRAM NECK WO CONTRAST-, MRI ANGIOGRAM HEAD WO  CONTRAST-, MRI BRAIN WO CONTRAST-     INDICATIONS: Stroke, follow up; I63.9-Cerebral infarction, unspecified;  R29.898-Other symptoms and signs involving the musculoskeletal system     COMPARISON: CT head 1 day prior, CT angiogram 6/27/2022     TECHNIQUE: Multiplanar multisequence MRI of the brain performed without  IV contrast. Noncontrast time-of-flight 3-dimensional MR angiogram of  the head  and neck with three-dimensional maximum intensity projections  postprocessed     Stenosis measurement was performed by the NASCET or similar method.     FINDINGS:   MRI brain: No acute infarct on diffusion weighted sequences. Midline  structures are normal and the craniocervical junction is satisfactory in  appearance. Gray-white differentiation is maintained, with minimal  typical T2 hyperintense periventricular white matter changes present,  favored to reflect sequela of mild chronic small vessel ischemia. There  is otherwise no evidence of intracranial hemorrhage, mass or mass  effect. The ventricles are normal in size and configuration. The orbits  are normal and the paranasal sinuses are grossly clear. Intracranial  arterial flow voids are maintained.     MR angiogram: MR angiogram of the neck demonstrates expected flow  related signal within the visualized common carotid arteries  bilaterally. There is 0% narrowing of the ICA origins bilaterally by  NASCET criteria. Normal course and caliber of the left vertebral artery.  There is absence of flow related signal seen within the right vertebral  artery, somewhat nonspecific for age-indeterminate occlusion versus  reversal of flow. Intracranially, the carotid siphons demonstrate  expected flow related signal. The anterior cerebral arteries are normal  in course and caliber bilaterally. The middle cerebral arteries  demonstrate no evidence of flow-limiting stenosis, large vessel  occlusion or aneurysmal dilatation. The vertebrobasilar system is  patent. The posterior cerebral arteries are normal in course and caliber  bilaterally.     IMPRESSION:  Essentially normal noncontrast MRI of the brain for patient age. There  is no evidence of acute infarct, hemorrhage, mass or mass effect.     MR angiogram of the neck demonstrates lack of flow related signal within  the right vertebral artery. This finding is somewhat nonspecific. While  occlusion remains a  consideration, this vessel was patent on fairly  recent CT angiogram and interval occlusion would likely have some  resultant ischemic effects intracranially. This finding on MR angiogram  can also be seen with reversal of flow, such as with subclavian steal.  If there is persistent clinical concern, CT angiogram of the neck versus  ultrasound could be performed to document right vertebral artery flow.     MR angiogram of the head and neck is otherwise essentially normal  without evidence of additional flow-limiting stenosis, large vessel  occlusion or aneurysmal dilatation.    Assessment/Plan     Assessment/Plan:  77 yr old female with a PMH significant for RMCA stroke (6/2022, intermittent difficulty with ambulation), remote tobacco abuse, remote alcohol abuse, and spastic colon who presents to the [facility] ED with c/o worsening left lower extremity weakness and difficulty with ambulation, and word finding difficulty that began 10/31 @ 0330.  Symptoms resolved on arrival to [facility].    Worsening left-sided weakness:  -Carotid ultrasound suggestive of right subclavian artery versus innominate artery stenosis  -Plan angiogram today with possible stenting by Dr. Concepcion  -Likely stroke recrudescence of previous stroke symptoms, cannot rule out TIA  -MRI brain with no evidence of AIS  -MRA H/N with no LVO, signal drop out in the right vertebral artery.   -Continue aspirin, Plavix.   -HLD, high-dose statin. LDL 59.   -Dizziness; meclizine as needed  -Normal blood pressure goals  -Up with assistance  -Keep follow-up in the stroke clinic on 12/5    Will follow-up with the patient this afternoon following her angiogram and update any plan of care as needed.    Stroke neurology will continue to follow.  Plan of care discussed with the patient. Please call with any further questions or concerns.    Shayla Melton, APRN  11/03/22  09:00 EDT

## 2022-11-03 NOTE — PROGRESS NOTES
Intensive Care Admission Note     Cerebrovascular accident (CVA), unspecified mechanism (HCC)    History of Present Illness     Alysia Aguayo is a 77 y.o. female who has a PMHX of CAD s/p stent placement in 2010, HTN, HLD, CKD, and R MCA CVA in 6/2022 and intermittent L side weakness since then, presented to ED on 10/31 after noticing L side weakness and trouble getting words out.  Back to baseline by time of arrival.  Code Stroke initiated in the ED and Neurology lópez been following.  She was admitted by Hospital Medicine to the wards and has remained w/o complaint/focal deficit.    She was found to have an occlusion on her MRA and carotid US suggestive of right subclavian artery vs innominate artery stenosis.  Angioplasty and stenting of high-grade symptomatic atherosclerotic stenosis at the origin of the innominate artery by Dr. Concepcion on 11/3 and then transferred to the ICU post-procedure.      Problem List, Surgical History, Family, Social History, and ROS     Patient Active Problem List    Diagnosis    • *Cerebrovascular accident (CVA), unspecified mechanism (HCC) [I63.9]    • Stroke-like symptoms [R29.90]    • Left arm numbness [R20.0]    • Anxiety and Depression [F41.9]    • Occipital neuralgia of left side [M54.81]    • Lumbar stenosis with neurogenic claudication [M48.062]    • DDD (degenerative disc disease), lumbar [M51.36]    • Cervical spondylosis without myelopathy [M47.812]    • Myofascial pain [M79.18]    • Frequent headaches [R51.9]    • History of alcohol abuse [F10.11]    • CAD s/p stent placement [Z95.5]    • History of lumbar laminectomy for spinal cord decompression [Z98.890]    • Essential hypertension [I10]    • Mixed hyperlipidemia [E78.2]      Past Surgical History:   Procedure Laterality Date   • CARDIAC CATHETERIZATION  2010    x2    • CATARACT EXTRACTION Bilateral    • CHOLECYSTECTOMY     • COLONOSCOPY  2016   • CORONARY STENT PLACEMENT  2010    x3    • ESOPHAGEAL DILATATION     •  HYSTERECTOMY     • LUMBAR DISCECTOMY  07/10/2014    Rt- L1/2 Dr. Dionicio Pollard    • LUMBAR LAMINECTOMY DISCECTOMY DECOMPRESSION N/A 10/17/2019    Procedure: LUMBAR LAMINECTOMY L3-5;  Surgeon: Dionicio Pollard MD;  Location: [facility];  Service: Neurosurgery   • TUBAL ABDOMINAL LIGATION     • UPPER GASTROINTESTINAL ENDOSCOPY         Allergies   Allergen Reactions   • Penicillins Hives   • Codeine Nausea And Vomiting   • Imodium A-D [Loperamide Hcl] Nausea And Vomiting     No current facility-administered medications on file prior to encounter.     Current Outpatient Medications on File Prior to Encounter   Medication Sig   • acetaminophen (TYLENOL) 325 MG tablet Take 650 mg by mouth Every 4 (Four) Hours As Needed for Mild Pain  or Moderate Pain .   • amLODIPine (NORVASC) 5 MG tablet Take 2.5 mg by mouth 2 (Two) Times a Day.   • aspirin 325 MG tablet Take 1 tablet by mouth Daily.   • aspirin 81 MG chewable tablet Chew 81 mg Daily. (Patient not taking: Reported on 10/31/2022)   • atorvastatin (LIPITOR) 80 MG tablet Take 1 tablet by mouth Every Night.   • clopidogrel (PLAVIX) 75 MG tablet Take 1 tablet by mouth Daily.   • Diclofenac Sodium (VOLTAREN) 1 % gel gel Apply 4 g topically to the appropriate area as directed 4 (Four) Times a Day As Needed (pain).   • Esomeprazole Magnesium (NEXIUM PO) Take 75 mg by mouth Before Breakfast.   • FLUoxetine (PROzac) 40 MG capsule Take 1 capsule by mouth Daily.   • glycopyrrolate (ROBINUL) 1 MG tablet Take 1 mg by mouth 2 (Two) Times a Day.   • meclizine (ANTIVERT) 25 MG tablet Take 1 tablet by mouth 3 (Three) Times a Day As Needed for Dizziness.   • meclizine (Dramamine) 25 MG tablet Take 12.5 mg by mouth 3 (Three) Times a Day As Needed for Dizziness.     MEDICATION LIST AND ALLERGIES REVIEWED.    Family History   Problem Relation Age of Onset   • Heart disease Mother    • Emphysema Mother    • Coronary artery disease Mother    • Cancer Father    • Lung cancer Father    • Colon  "cancer Neg Hx      Social History     Tobacco Use   • Smoking status: Former     Packs/day: 1.50     Years: 40.00     Pack years: 60.00     Types: Cigarettes     Quit date: 2000     Years since quittin.8   • Smokeless tobacco: Never   Vaping Use   • Vaping Use: Never used   Substance Use Topics   • Alcohol use: No     Comment: hasn't used since , hx of alcoholism   • Drug use: No     Social History     Social History Narrative   • Not on file     FAMILY AND SOCIAL HISTORY REVIEWED.    ROS:  See HPI and H&P  ALL OTHER SYSTEMS REVIEWED AND ARE NEGATIVE.    Physical Exam and Clinical Information   /79   Pulse 77   Temp 98.1 °F (36.7 °C) (Oral)   Resp 16   Ht 160 cm (62.99\")   Wt 68.9 kg (151 lb 14.4 oz)   SpO2 96%   BMI 26.91 kg/m²   Physical Exam  General Appearance: Awake, alert, in no acute distress  Head:    Atraumatic, Normocephalic, without obvious abnormality, Pupils reactive & symmetrical B/L.  Neck:   Supple.   Lungs:   B/L Breath sounds present with decreased breath sounds on bases, no wheezing heard, no crackles.   Heart: S1 and S2 present, no murmur  Abdomen: Soft, nontender, no guarding or rigidity, bowel sounds positive.  Extremities: Right groin access site without hematoma, no cyanosis or clubbing,  no edema, warm to touch.  Pulses: Positive and symmetric.  Neurologic:  Moving all four extremities. Good strength bilaterally.   Psychological: Normal affect, Cooperative    Results from last 7 days   Lab Units 22  0706 10/31/22  1445   WBC 10*3/mm3 6.41 8.57   HEMOGLOBIN g/dL 13.1 13.0   HEMOGLOBIN, POC g/dL  --  14.3   PLATELETS 10*3/mm3 239 253     Results from last 7 days   Lab Units 22  0706 10/31/22  1445   SODIUM mmol/L 137  --    POTASSIUM mmol/L 4.5  --    CO2 mmol/L 19.0*  --    BUN mg/dL 20  --    CREATININE mg/dL 0.98 1.20   GLUCOSE mg/dL 107*  --      Estimated Creatinine Clearance: 45.5 mL/min (by C-G formula based on SCr of 0.98 mg/dL).  Results from last " 7 days   Lab Units 11/01/22  0634   HEMOGLOBIN A1C % 5.20         No results found for: LACTATE     Images: None new.     I reviewed the patient's results and images.     Impression       Cerebrovascular accident (CVA), unspecified mechanism (HCC)    CAD s/p stent placement    Essential hypertension    Mixed hyperlipidemia    H/O: R MCA CVA (cerebrovascular accident)      Plan/Recommendations     77-year-old female with past medical history of smoking quit in 2000, past history of alcohol abuse quit in 2005, hypertension, dyslipidemia, chronic kidney disease, coronary disease s/p stent placement in 2010, right MCA CVA in June 2022.  Patient has been having problems with intermittent left-sided weakness and difficulty with ambulation and word finding difficulties intermittently since then.  Patient was brought to ER and code stroke was called.  However symptoms improved when she arrived.  Further work-up was done and patient was found to have right subclavian artery versus innominate artery stenosis.  Patient was taken to Cath Lab per Dr. Concepcion today and underwent angiogram.  Angioplasty and stenting of high-grade symptomatic stenosis at origin of innominate artery.  Procedure was uneventful.  Post procedure patient is admitted to ICU for closer monitoring.    Patient seen at bedside.  She is without any complaints.  Blood pressure is in 150s range.  Plan is to start patient on Cardizem per protocol.    1.  Admit to intensive care unit for closer neurologic monitoring.  Clinically doing well thus far.  Neurosurgery and neurology team following.  2.  Continue aspirin, Plavix, statin.  Blood pressure control per protocol with nicardipine drip as needed.  3.  Patient does have history of GERD and has been on esomeprazole at home.  Placed on pantoprazole currently.  4.  Continue Prozac for underlying history of anxiety/depression.  5.  DVT prophylaxis.  6.  Monitor urine output and electrolytes closely.  Replace  electrolytes per ICU protocol.  Other labs look acceptable.  7.  Consider cardiac monitoring on discharge.  Previous Holter monitor for 2 weeks did not show any evidence of atrial fibrillation however.    Continue close monitoring in ICU tonight.      7 minutes of care provided by HUE Florentino in addendum accordance with split/shared billing. This time excludes other billable procedures. Time does include preparation of documents, medical consultations, review of old records, and direct bedside care. Patient is at high risk for life-threatening deterioration due to neurologic catastrophe, respiratory failure.   Electronically signed by HUE Bui, 11/03/22, 7:02 PM EDT.     I have seen and examined patient, performing a face-to-face diagnostic evaluation with plan of care reviewed and developed with HUE and nursing staff. I have addended and modified the above history of present illness, conducted physical examination and documented as above, and assessment and plan to reflect my findings and impressions.     Time spent 30 min (exclusive of procedure time)  including high complexity decision making to assess, manipulate, and support vital organ system failure in this individual who has impairment of one or more vital organ systems such that there is a high probability of imminent or life threatening deterioration in the patient’s condition.      Zeus Aldridge MD, [facility]  Pulmonary and Critical Care Medicine  11/03/22 21:48 EDT

## 2022-11-03 NOTE — PROGRESS NOTES
[facility] Medicine Services  PROGRESS NOTE    Patient Name: Alysia Aguayo  : 1945  MRN: [id number]    Date of Admission: 10/31/2022  Primary Care Physician: Yesica Zhu PA-C    Subjective   Subjective     CC:  Weakness     HPI:  Patient is resting in bed in NAD with family at bedside. She denies any weakness or speech difficulties. Plan for angiogram today.     ROS:  Gen- No fevers, chills  CV- No chest pain, palpitations  Resp- No cough, dyspnea  GI- No N/V/D, abd pain        Objective   Objective     Vital Signs:   Temp:  [98 °F (36.7 °C)-98.3 °F (36.8 °C)] 98 °F (36.7 °C)  Heart Rate:  [53-86] 68  Resp:  [16-18] 16  BP: (120-139)/(64-75) 131/71     Physical Exam:  Constitutional: No acute distress, awake, alert  HENT: NCAT, mucous membranes moist  Respiratory: Clear to auscultation bilaterally, respiratory effort normal room air   Cardiovascular: RRR, no murmurs, rubs, or gallops  Gastrointestinal: Positive bowel sounds, soft, nontender, nondistended  Musculoskeletal: No bilateral ankle edema  Psychiatric: Appropriate affect, cooperative  Neurologic: Oriented x 3, strength symmetric in all extremities, Cranial Nerves grossly intact to confrontation, speech clear  Skin: No rashes      Results Reviewed:  LAB RESULTS:      Lab 22  0706 10/31/22  1449 10/31/22  1445   WBC 6.41  --  8.57   HEMOGLOBIN 13.1  --  13.0   HEMOGLOBIN, POC  --   --  14.3   HEMATOCRIT 40.0  --  41.8   HEMATOCRIT POC  --   --  42   PLATELETS 239  --  253   NEUTROS ABS 3.67  --  5.57   IMMATURE GRANS (ABS) 0.01  --  0.02   LYMPHS ABS 1.93  --  2.13   MONOS ABS 0.62  --  0.68   EOS ABS 0.14  --  0.12   MCV 96.4  --  101.5*   PROTIME  --  12.8  --    APTT  --   --  28.9         Lab 22  0706 22  0634 10/31/22  1445   SODIUM 137  --   --    POTASSIUM 4.5  --   --    CHLORIDE 107  --   --    CO2 19.0*  --   --    ANION GAP 11.0  --   --    BUN 20  --   --    CREATININE 0.98  --  1.20    EGFR 59.9*  --  47.0*   GLUCOSE 107*  --   --    CALCIUM 9.5  --   --    HEMOGLOBIN A1C  --  5.20  --          Lab 10/31/22  1445   ALT (SGPT) 14   AST (SGOT) 21         Lab 10/31/22  1449 10/31/22  1445   TROPONIN T  --  <0.010   PROTIME 12.8  --    INR 1.1  --          Lab 11/01/22  0634   CHOLESTEROL 133   LDL CHOL 59   HDL CHOL 56   TRIGLYCERIDES 96         Lab 11/01/22  0634   FOLATE 8.73   VITAMIN B 12 385         Brief Urine Lab Results     None          Microbiology Results Abnormal     None          No radiology results from the last 24 hrs    Results for orders placed during the hospital encounter of 06/27/22    Adult Transthoracic Echo Complete W/ Cont if Necessary Per Protocol (With Agitated Saline)    Interpretation Summary  · Saline test results are negative.  · Estimated left ventricular EF = 60% Left ventricular systolic function is normal.      I have reviewed the medications:  Scheduled Meds:aspirin, 81 mg, Oral, Daily   Or  aspirin, 300 mg, Rectal, Daily  atorvastatin, 80 mg, Oral, Nightly  clopidogrel, 75 mg, Oral, Daily  enoxaparin, 40 mg, Subcutaneous, Daily  FLUoxetine, 40 mg, Oral, Daily  sodium chloride, 10 mL, Intravenous, Q12H      Continuous Infusions:   PRN Meds:.•  acetaminophen  •  sodium chloride  •  sodium chloride    Assessment & Plan   Assessment & Plan     Active Hospital Problems    Diagnosis  POA   • **Cerebrovascular accident (CVA), unspecified mechanism (HCC) [I63.9]  Yes   • Stroke-like symptoms [R29.90]  Unknown   • CAD s/p stent placement [Z95.5]  Not Applicable   • Essential hypertension [I10]  Yes   • Mixed hyperlipidemia [E78.2]  Yes      Resolved Hospital Problems   No resolved problems to display.        Brief Hospital Course to date:  Alysia Aguayo is a 77 y.o. female with history of  CAD s/p stent placement in 2010, HTN, HLD, CKD, and R MCA CVA in 6/2022 and intermittent L side weakness since then, presented to ED after noticing L side weakness and trouble  getting words out.  Back to baseline by time of arrival. [facility] neurology followed. Patient was started back on plavix. Signal drop in right vertebral artery on MRA and NSGY was consulted and patient had a Angiogram      Plan was partially entered by my partner and I have reviewed and updated as appropriate on 11/3/22     L side weakness/expressive aphasia, transient  -- suspect recrudescent symptoms associated with intermittent hypotension   -- BP fell to 97/62 in ED  -- stroke team following; MRI/MRA as below  -- continue ASA Plavix and high intensity statin   -- P2Y12 c/w pt NOT taking Plavix (she reported that she was no longer taking this).    -- Carotid ultrasound suggestive of right subclavian artery versus innominate artery stenosis  -- found to have signal drop out in R vertebral artery on MRA H/N. NSGY consulted and plans for angiogram with possible stent today   -- follow up with stroke clinic on 12/5/22     HTN  - states she takes amlodipine and lisinopril 40mg x a long time  -- held on admission due to concern for CVA, continue home meds as needed (of note, Lisinopril was NOT on her medication rec)  -- neuro recommends normal bp goals. bp has been stable off of medication      Reported irreg beats seen on home BP machine  - NSR here  - previous event monitor negative for atrial arrhythmias      CKD stage 3a  - creat baseline.       Macrocytosis  -  B12 and folate wnl  -- not anemic       Expected Discharge Location and Transportation: home   Expected Discharge Date: 11/3 or 11/4    DVT prophylaxis:  Medical and mechanical DVT prophylaxis orders are present.     AM-PAC 6 Clicks Score (PT): 23 (11/03/22 0800)    CODE STATUS:   Code Status and Medical Interventions:   Ordered at: 11/01/22 0442     Level Of Support Discussed With:    Patient     Code Status (Patient has no pulse and is not breathing):    CPR (Attempt to Resuscitate)     Medical Interventions (Patient has pulse or is breathing):    Full  Support     Release to patient:    Routine Release       Page Dela Cruz, APRN  11/03/22

## 2022-11-04 ENCOUNTER — READMISSION MANAGEMENT (OUTPATIENT)
Dept: CALL CENTER | Facility: HOSPITAL | Age: 77
End: 2022-11-04

## 2022-11-04 ENCOUNTER — APPOINTMENT (OUTPATIENT)
Dept: CARDIOLOGY | Facility: HOSPITAL | Age: 77
DRG: 254 | End: 2022-11-04
Payer: MEDICARE

## 2022-11-04 ENCOUNTER — TELEPHONE (OUTPATIENT)
Dept: NEUROSURGERY | Facility: CLINIC | Age: 77
End: 2022-11-04

## 2022-11-04 VITALS
TEMPERATURE: 97.7 F | BODY MASS INDEX: 27.66 KG/M2 | HEIGHT: 63 IN | WEIGHT: 156.09 LBS | HEART RATE: 60 BPM | DIASTOLIC BLOOD PRESSURE: 71 MMHG | RESPIRATION RATE: 16 BRPM | OXYGEN SATURATION: 97 % | SYSTOLIC BLOOD PRESSURE: 109 MMHG

## 2022-11-04 DIAGNOSIS — I65.01 STENOSIS OF RIGHT VERTEBRAL ARTERY: ICD-10-CM

## 2022-11-04 DIAGNOSIS — I63.9 CEREBROVASCULAR ACCIDENT (CVA), UNSPECIFIED MECHANISM: Primary | ICD-10-CM

## 2022-11-04 DIAGNOSIS — R29.90 STROKE-LIKE SYMPTOMS: ICD-10-CM

## 2022-11-04 DIAGNOSIS — I63.511 ACUTE ISCHEMIC RIGHT MCA STROKE: ICD-10-CM

## 2022-11-04 DIAGNOSIS — Z86.73 H/O: CVA (CEREBROVASCULAR ACCIDENT): ICD-10-CM

## 2022-11-04 LAB
ANION GAP SERPL CALCULATED.3IONS-SCNC: 11 MMOL/L (ref 5–15)
BUN SERPL-MCNC: 28 MG/DL (ref 8–23)
BUN/CREAT SERPL: 35.9 (ref 7–25)
CALCIUM SPEC-SCNC: 8.3 MG/DL (ref 8.6–10.5)
CHLORIDE SERPL-SCNC: 105 MMOL/L (ref 98–107)
CO2 SERPL-SCNC: 20 MMOL/L (ref 22–29)
CREAT SERPL-MCNC: 0.78 MG/DL (ref 0.57–1)
DEPRECATED RDW RBC AUTO: 48.5 FL (ref 37–54)
EGFRCR SERPLBLD CKD-EPI 2021: 78.8 ML/MIN/1.73
ERYTHROCYTE [DISTWIDTH] IN BLOOD BY AUTOMATED COUNT: 13.5 % (ref 12.3–15.4)
GLUCOSE SERPL-MCNC: 93 MG/DL (ref 65–99)
HCT VFR BLD AUTO: 34.7 % (ref 34–46.6)
HGB BLD-MCNC: 11.2 G/DL (ref 12–15.9)
MAGNESIUM SERPL-MCNC: 1.6 MG/DL (ref 1.6–2.4)
MCH RBC QN AUTO: 31.7 PG (ref 26.6–33)
MCHC RBC AUTO-ENTMCNC: 32.3 G/DL (ref 31.5–35.7)
MCV RBC AUTO: 98.3 FL (ref 79–97)
PHOSPHATE SERPL-MCNC: 4 MG/DL (ref 2.5–4.5)
PLATELET # BLD AUTO: 235 10*3/MM3 (ref 140–450)
PMV BLD AUTO: 9.4 FL (ref 6–12)
POTASSIUM SERPL-SCNC: 4 MMOL/L (ref 3.5–5.2)
RBC # BLD AUTO: 3.53 10*6/MM3 (ref 3.77–5.28)
SODIUM SERPL-SCNC: 136 MMOL/L (ref 136–145)
WBC NRBC COR # BLD: 7.84 10*3/MM3 (ref 3.4–10.8)

## 2022-11-04 PROCEDURE — 25010000002 ENOXAPARIN PER 10 MG: Performed by: NEUROLOGICAL SURGERY

## 2022-11-04 PROCEDURE — 99024 POSTOP FOLLOW-UP VISIT: CPT | Performed by: NEUROLOGICAL SURGERY

## 2022-11-04 PROCEDURE — 84100 ASSAY OF PHOSPHORUS: CPT | Performed by: NURSE PRACTITIONER

## 2022-11-04 PROCEDURE — 85027 COMPLETE CBC AUTOMATED: CPT | Performed by: NURSE PRACTITIONER

## 2022-11-04 PROCEDURE — 93880 EXTRACRANIAL BILAT STUDY: CPT

## 2022-11-04 PROCEDURE — 99239 HOSP IP/OBS DSCHRG MGMT >30: CPT

## 2022-11-04 PROCEDURE — 80048 BASIC METABOLIC PNL TOTAL CA: CPT | Performed by: NURSE PRACTITIONER

## 2022-11-04 PROCEDURE — 83735 ASSAY OF MAGNESIUM: CPT | Performed by: NURSE PRACTITIONER

## 2022-11-04 PROCEDURE — 99232 SBSQ HOSP IP/OBS MODERATE 35: CPT | Performed by: NURSE PRACTITIONER

## 2022-11-04 RX ORDER — ASPIRIN 81 MG/1
81 TABLET, CHEWABLE ORAL DAILY
Qty: 30 TABLET | Refills: 1 | Status: SHIPPED | OUTPATIENT
Start: 2022-11-04

## 2022-11-04 RX ADMIN — ENOXAPARIN SODIUM 40 MG: 40 INJECTION SUBCUTANEOUS at 08:53

## 2022-11-04 RX ADMIN — CLOPIDOGREL BISULFATE 75 MG: 75 TABLET ORAL at 08:53

## 2022-11-04 RX ADMIN — ASPIRIN 81 MG CHEWABLE TABLET 81 MG: 81 TABLET CHEWABLE at 08:53

## 2022-11-04 RX ADMIN — PANTOPRAZOLE SODIUM 40 MG: 40 TABLET, DELAYED RELEASE ORAL at 06:44

## 2022-11-04 RX ADMIN — Medication 10 ML: at 08:53

## 2022-11-04 NOTE — PROGRESS NOTES
Chief complaint: TIA, critical innominate artery stenosis    Admit Diagnosis:   Cerebrovascular accident (CVA), unspecified mechanism (HCC) [I63.9]     Subjective: No events overnight.  Postoperative day 1 status post brachiocephalic/innominate artery stent    Objective:    Vitals:    22 0700   BP: 116/59   Pulse: 54   Resp:    Temp:    SpO2: 97%     Pulse  Av.7  Min: 52  Max: 86  Systolic (24hrs), Av , Min:96 , Max:186     Diastolic (24hrs), Av, Min:44, Max:100    Temp (24hrs), Av.9 °F (36.6 °C), Min:97.7 °F (36.5 °C), Max:98.1 °F (36.7 °C)      Puncture site is soft with no hematoma or significant ecchymosis    Lab Results   Component Value Date     2022       A/P:   Admit Diagnosis:   Cerebrovascular accident (CVA), unspecified mechanism (HCC) [I63.9]     - Continue aspirin and Plavix indefinitely  - Okay to discharge home once her baseline carotid ultrasound has been completed  - Follow-up with Jamey in neurosurgery in 6 months with a repeat carotid ultrasound  - Signs and symptoms of stroke were reviewed with the patient.

## 2022-11-04 NOTE — DISCHARGE SUMMARY
Discharge Summary    Patient name: Alysia Aguayo  CSN: [id number]  MRN: [id number]  : 1945  Today's date: 2022     Date of Admission: 10/31/2022  Date of Discharge:  2022    Admitting Physician:  Dr. Magdiel Osborne MD  Primary Care Provider: Yesica Zhu PA-C  Consultations:          Dr. Jamey MD, Neurosurgery     Dr. Neela MD, Neurology    Admission Diagnosis: CVA     Discharge Diagnoses:   Active Hospital Problems    Diagnosis    • **Cerebrovascular accident (CVA), unspecified mechanism (HCC)    • H/O: R MCA CVA (cerebrovascular accident)    • CAD s/p stent placement    • Essential hypertension    • Mixed hyperlipidemia      Allergies:  Penicillins, Codeine, and Imodium a-d [loperamide hcl]    Code Status and Medical Interventions:   Ordered at: 22 0442     Level Of Support Discussed With:    Patient     Code Status (Patient has no pulse and is not breathing):    CPR (Attempt to Resuscitate)     Medical Interventions (Patient has pulse or is breathing):    Full Support     Release to patient:    Routine Release     Procedures/Testing:  Procedure(s):  IR angiogram vertebral cervical intracranial     History of Present Illness:  Alysia Aguayo is a 77 y.o. female with PMH HTN, dyslipidemia, CKD, CAD s/p stent placement in , right MCA CVA in 2022, and remote tobacco and alcohol abuse who presented to [facility] ED on 10/31/22 after noticing left-sided weakness and trouble getting words out. She was back to her baseline neurological status by time of arrival. Code Stroke initiated in ED. She was admitted by Hospital Medicine with neurology following.     Hospital Course:  Patient has had an uncomplicated clinical course and has remained without focal deficits. She was noted to have an occlusion on MRA and carotid ultrasound suggestive of right subclavian artery vs innominate artery stenosis. Angioplasty and stenting of high-grade symptomatic atherosclerotic stenosis at  "the origin of the innominate artery was  performed by Dr. Concepcion on 11/3/22. She was transferred to ICU post-procedure, where she has remained stable without focal deficit. She has been deemed medically appropriate for discharge home today, 11/04/22, after carotid ultrasound completed.    Vitals:  /71   Pulse 60   Temp 97.7 °F (36.5 °C) (Oral)   Resp 16   Ht 160 cm (62.99\")   Wt 70.8 kg (156 lb 1.4 oz)   SpO2 97%   BMI 27.66 kg/m²     Physical Exam:  GENERAL: Patient sitting up in bed and conversant. No acute distress.   HEENT: Normocephalic and atraumatic. Trachea midline. PER. EOM WNL.   LUNGS: Chest rise of normal depth and symmetric. Lungs clear to auscultation bilaterally. No wheezes, rhonchi, or rales.   HEART: S1,S2 detected. Regular rate and rhythm. No rub, murmur, or gallop.   ABDOMEN: Soft, round, nondistended, and nontender. Bowel sounds present.   EXTREMITIES: No clubbing, edema, or cyanosis. Peripheral pulses present. Skin warm and dry. Right groin site dry and intact. No swelling.   NEURO/PSYCH: Alert and oriented. Follows commands. Moves all extremities. No focal deficits.        Labs:  Results from last 7 days   Lab Units 11/04/22  0456   WBC 10*3/mm3 7.84   HEMOGLOBIN g/dL 11.2*   HEMATOCRIT % 34.7   PLATELETS 10*3/mm3 235     Results from last 7 days   Lab Units 11/04/22  0456 11/03/22  0706 10/31/22  1445   SODIUM mmol/L 136   < >  --    POTASSIUM mmol/L 4.0   < >  --    CHLORIDE mmol/L 105   < >  --    CO2 mmol/L 20.0*   < >  --    BUN mg/dL 28*   < >  --    CREATININE mg/dL 0.78   < > 1.20   CALCIUM mg/dL 8.3*   < >  --    ALT (SGPT) U/L  --   --  14   AST (SGOT) U/L  --   --  21   GLUCOSE mg/dL 93   < >  --     < > = values in this interval not displayed.         Magnesium   Date Value Ref Range Status   11/04/2022 1.6 1.6 - 2.4 mg/dL Final     Phosphorus   Date Value Ref Range Status   11/04/2022 4.0 2.5 - 4.5 mg/dL Final                    Discharge Medications      New " Medications      Instructions Start Date   clopidogrel 75 MG tablet  Commonly known as: PLAVIX   75 mg, Oral, Daily         Changes to Medications      Instructions Start Date   aspirin 81 MG chewable tablet  What changed: Another medication with the same name was removed. Continue taking this medication, and follow the directions you see here.   81 mg, Oral, Daily         Continue These Medications      Instructions Start Date   acetaminophen 325 MG tablet  Commonly known as: TYLENOL   650 mg, Oral, Every 4 Hours PRN      amLODIPine 5 MG tablet  Commonly known as: NORVASC   2.5 mg, Oral, 2 Times Daily      atorvastatin 80 MG tablet  Commonly known as: LIPITOR   80 mg, Oral, Nightly      Diclofenac Sodium 1 % gel gel  Commonly known as: VOLTAREN   4 g, Topical, 4 Times Daily PRN      FLUoxetine 40 MG capsule  Commonly known as: PROzac   40 mg, Oral, Daily      glycopyrrolate 1 MG tablet  Commonly known as: ROBINUL   1 mg, Oral, 2 Times Daily      Dramamine 25 MG tablet  Generic drug: meclizine   12.5 mg, Oral, 3 Times Daily PRN      meclizine 25 MG tablet  Commonly known as: ANTIVERT   25 mg, Oral, 3 Times Daily PRN      NEXIUM PO   75 mg, Oral, Before Breakfast           Diet Instructions     Diet: Regular; Thin      Discharge Diet: Regular    Fluid Consistency: Thin        Activity Instructions     Activity as Tolerated      Lifting Restrictions      Type of Restriction:  Lifting  Bathing       Bathing Restrictions: No Tub Bath    Lifting Restrictions: No Lifting Until Cleared By Provider      Follow-up Appointments  Future Appointments   Date Time Provider Department Center   11/7/2022 11:00 AM Yesica Zhu PA-C [facility]   11/30/2022 10:45 AM Yesica Zhu PA-C [facility]   12/5/2022  2:30 PM [facility]     Additional Instructions for the Follow-ups that You Need to Schedule     Call MD With Problems / Concerns   As directed      Instructions: Keep groin site  clean and dry. No soaking in the tub or heavy lifting until follow-up appointment. Go to ER if you develop swelling in the groin.    Order Comments: Instructions: Keep groin site clean and dry. No soaking in the tub or heavy lifting until follow-up appointment. Go to ER if you develop swelling in the groin.          Discharge Follow-up with PCP   As directed       Currently Documented PCP:    Yesica Zhu PA-C    PCP Phone Number:    [phone]     Follow Up Details: in one week with PCP         Discharge Follow-up with PCP   As directed       Currently Documented PCP:    Yesica Zhu PA-C    PCP Phone Number:    [phone]     Follow Up Details: 1 week               Discharge Instructions:  OK for discharge home this morning  Follow-up appointments as above  Medications as above  Call 911 or return to ED if symptoms recur       Angella Arreguin, MSN, APRN, ACNPC-AG  Pulmonary and Critical Care Medicine    Time: I spent 35 minutes on this discharge activity which included: face-to-face encounter with the patient, reviewing the data in the system, coordination of the care with the nursing staff as well as consultants, documentation, and entering orders.      CC: Yesica Zhu PA-C

## 2022-11-04 NOTE — PROGRESS NOTES
Intensive Care Follow-up     Hospital:  LOS: 2 days   Ms. Alysia Aguayo, 77 y.o. female is followed for:   Cerebrovascular accident (CVA), unspecified mechanism (HCC)     Subjective   Interval History:  No events overnight. Patient awake and conversant, on room air with stable vitals. No complaints of pain. Right groin site intact without swelling. Magnesium replacement infusing. Patient reports eating and walking well.       Review of Systems  General ROS: negative for - chills, fever or sleep disturbance  Respiratory ROS: no cough, shortness of breath, or wheezing  Cardiovascular ROS: no chest pain or dyspnea on exertion  Gastrointestinal ROS: no abdominal pain, change in bowel habits, or black or bloody stools  Musculoskeletal ROS: negative for - gait disturbance, joint pain or swelling  Neurological ROS: no TIA or stroke symptoms. Negative for - behavioral changes, confusion, numbness/tingling, speech problems, visual changes or weakness    The patient's past medical, surgical and social history were reviewed and updated in Epic as appropriate.     Objective     Infusions:  niCARdipine, 5-15 mg/hr, Last Rate: Stopped (11/03/22 1721)    Medications:  aspirin, 81 mg, Oral, Daily   Or  aspirin, 300 mg, Rectal, Daily  atorvastatin, 80 mg, Oral, Nightly  clopidogrel, 75 mg, Oral, Daily  enoxaparin, 40 mg, Subcutaneous, Daily  FLUoxetine, 40 mg, Oral, Daily  pantoprazole, 40 mg, Oral, Q AM  sodium chloride, 10 mL, Intravenous, Q12H      I reviewed the patient's medications.    Vital Sign Min/Max for last 24 hours  Temp  Min: 97.7 °F (36.5 °C)  Max: 98.1 °F (36.7 °C)   BP  Min: 96/45  Max: 186/88   Pulse  Min: 52  Max: 86   Resp  Min: 14  Max: 16   SpO2  Min: 93 %  Max: 100 %   No data recorded       Input/Output for last 24 hour shift  11/03 0701 - 11/04 0700  In: 207 [I.V.:207]  Out: -       Physical Exam:  GENERAL: Patient sitting up in bed and conversant. No acute distress.   HEENT: Normocephalic and  atraumatic. Trachea midline. PER. EOM WNL.   LUNGS: Chest rise of normal depth and symmetric. Lungs clear to auscultation bilaterally. No wheezes, rhonchi, or rales.   HEART: S1,S2 detected. Regular rate and rhythm. No rub, murmur, or gallop.   ABDOMEN: Soft, round, nondistended, and nontender. Bowel sounds present.   EXTREMITIES: No clubbing, edema, or cyanosis. Peripheral pulses present. Skin warm and dry. Right groin site dry and intact. No swelling.   NEURO/PSYCH: Alert and oriented. Follows commands. Moves all extremities. No focal deficits.      Results from last 7 days   Lab Units 11/04/22  0456 11/03/22  0706 10/31/22  1445   WBC 10*3/mm3 7.84 6.41 8.57   HEMOGLOBIN g/dL 11.2* 13.1 13.0   HEMOGLOBIN, POC g/dL  --   --  14.3   PLATELETS 10*3/mm3 235 239 253     Results from last 7 days   Lab Units 11/04/22 0456 11/03/22  0706 10/31/22  1445   SODIUM mmol/L 136 137  --    POTASSIUM mmol/L 4.0 4.5  --    CO2 mmol/L 20.0* 19.0*  --    BUN mg/dL 28* 20  --    CREATININE mg/dL 0.78 0.98 1.20   MAGNESIUM mg/dL 1.6  --   --    PHOSPHORUS mg/dL 4.0  --   --    GLUCOSE mg/dL 93 107*  --      Estimated Creatinine Clearance: 57.9 mL/min (by C-G formula based on SCr of 0.78 mg/dL).    I reviewed the patient's new clinical results.  I reviewed the patient's new imaging results/reports including actual images and agree with reports.     Imaging Results (Last 24 Hours)     ** No results found for the last 24 hours. **        Assessment & Plan   Impression      Cerebrovascular accident (CVA), unspecified mechanism (HCC)    CAD s/p stent placement    Essential hypertension    Mixed hyperlipidemia    H/O: R MCA CVA (cerebrovascular accident)       Plan      Alysia Aguayo is a 77 y.o. female with PMH HTN, dyslipidemia, CKD, CAD s/p stent placement in 2010, right MCA CVA in June 2022, and remote tobacco and alcohol abuse who presented to [facility] ED on 10/31/22 after noticing left-sided weakness and trouble getting words out.  She was back to her baseline neurological status by time of arrival. Code Stroke initiated in ED and neurology following. She was admitted by Hospital medicine and has remained without focal deficits. She was noted to have an occlusion on MRA and carotid ultrasound suggestive of right subclavian artery vs innominate artery stenosis. Angioplasty and stenting of high-grade symptomatic atherosclerotic stenosis at the origin of the innominate artery performed by Dr. Concepcion on 11/3. She was transferred to ICU post-procedure, where she has remained stable without focal deficit. She is appropriate for discharge home today, 11/04/22, after carotid ultrasound completed.    Cerebrovascular accident (CVA), unspecified mechanism (HCC)  Likely prior stroke symptom recrudescence; cannot r/o TIA  Continue DAPT indefinitely  Stroke education  Follow-up in stroke clinic 12/05/22  Follow-up with neurosurgery in 6 months with repeat carotid ultrasound    CAD s/p stent placement  DAPT    Essential hypertension  Monitor BP  Continue Amlodipine    Mixed hyperlipidemia  Continue Lipitor    DVT Prophylaxis: Lovenox  Dispo: OK for discharge home    Time spent: 15 minutes  Plan of care and goals reviewed with multidisciplinary/antibiotic stewardship team during rounds.   I discussed the patient's findings and my recommendations with patient, family and nursing staff.     Angella Arreguin, MSN, APRN, ACNPC-AG  Pulmonary and Critical Care Medicine  Electronically signed by HUE Peralat, 11/04/22, 10:33 AM EDT.

## 2022-11-04 NOTE — PROGRESS NOTES
Stroke Progress Note       Chief Complaint:  Worsening left sided weakness    Subjective    Subjective     Subjective:  No adverse events overnight. NAD noted. S/p right brachiocephalic/innominate artery stent yesterday. Doing well. NIH 0.     Review of Systems   All other systems reviewed and are negative.           Objective    Objective      Temp:  [97.7 °F (36.5 °C)-98.1 °F (36.7 °C)] 97.7 °F (36.5 °C)  Heart Rate:  [52-86] 54  Resp:  [14-16] 16  BP: ()/() 116/59    Neurological Exam  Mental Status  Awake, alert and oriented to person, place and time.Alert. Oriented to person, place, and time. Speech is normal. Language is fluent with no aphasia. Attention and concentration are normal.     Cranial Nerves  CN II: Visual acuity is normal. Visual fields full to confrontation.  CN III, IV, VI: Extraocular movements intact bilaterally. Normal lids and orbits bilaterally. Pupils equal round and reactive to light bilaterally.  CN V: Facial sensation is normal.  CN VII: Full and symmetric facial movement.  CN IX, X: Palate elevates symmetrically  CN XI: Shoulder shrug strength is normal.  CN XII: Tongue midline without atrophy or fasciculations.     Motor   Strength is 5/5 throughout all four extremities.     Sensory  Light touch is normal in upper and lower extremities.      Reflexes                                            Right                      Left  Plantar                           Downgoing                Downgoing     Coordination  Right: Finger-to-nose normal.Left: Finger-to-nose normal.     Gait   Normal gait.Casual gait is normal including stance, stride, and arm swing.        Physical Exam  Constitutional:       Appearance: Normal appearance.   HENT:      Head: Normocephalic and atraumatic.   Eyes:      General: Lids are normal.      Extraocular Movements: Extraocular movements intact.      Pupils: Pupils are equal, round, and reactive to light.   Cardiovascular:      Rate and Rhythm: Normal  rate and regular rhythm.   Pulmonary:      Effort: Pulmonary effort is normal. No respiratory distress.   Abdominal:      General: There is no distension.      Palpations: Abdomen is soft.   Musculoskeletal:         General: Normal range of motion.      Cervical back: Normal range of motion and neck supple.   Skin:     General: Skin is warm and dry.      Capillary Refill: Capillary refill takes less than 2 seconds.   Neurological:      Mental Status: She is alert and oriented to person, place, and time.      Cranial Nerves: No cranial nerve deficit.      Sensory: No sensory deficit.      Motor: Motor strength is normal. No weakness.      Coordination: Coordination normal.      Gait: Gait normal.   Psychiatric:         Mood and Affect: Mood normal.         Speech: Speech normal.          Results Review:    I reviewed the patient's new clinical results.    Results for orders placed during the hospital encounter of 06/27/22    Adult Transthoracic Echo Complete W/ Cont if Necessary Per Protocol (With Agitated Saline)    Interpretation Summary  · Saline test results are negative.  · Estimated left ventricular EF = 60% Left ventricular systolic function is normal.            Assessment/Plan     Assessment/Plan:  77 yr old female with a PMH significant for RMCA stroke (6/2022, intermittent difficulty with ambulation), remote tobacco abuse, remote alcohol abuse, and spastic colon who presents to the [facility] ED with c/o worsening left lower extremity weakness and difficulty with ambulation, and word finding difficulty that began 10/31 @ 0330.  Symptoms resolved on arrival to [facility].    Worsening left-sided weakness, Right innominate artery stenosis  -Likely stroke recrudescence of previous stroke symptoms, cannot rule out TIA  -MRI brain with no evidence of AIS  -MRA H/N with no LVO, signal drop out in the right vertebral artery.   -CUS; Critical right innominate artery stenosis   -Innominate artery stenosis; S/p  brachiocephalic/innominate artery stent with Dr. Concepcion yesterday. She will follow up with him in 6 months with repeat CUS.  -Continue DAPT indefinitely  -HLD, high-dose statin. LDL 59.   -Dizziness; meclizine as needed  -Up with assistance  -HTN; Normalize BP goals. <130/80  -Keep follow-up in the stroke clinic on 12/5  -Stroke education including risk factor modification, medical regimen compliance, and s/s stroke and when to seek emergency care discussed.   -Plan discussed with the patient, her daughter, primary team, and nursing staff. Pt can discharge home after CUS today. Stroke Neurology will sign off. Please call with any questions or concerns.    HUE Lopez, AGACNP-BC  11/04/22  09:57 EDT

## 2022-11-04 NOTE — CASE MANAGEMENT/SOCIAL WORK
Case Management Discharge Note      Final Note: I spoke with Ms Aguayo and her family at bedside as she is up for discharge.  She denied any needs prior to leaving the facility.         Selected Continued Care - Admitted Since 10/31/2022     Destination    No services have been selected for the patient.              Durable Medical Equipment    No services have been selected for the patient.              Dialysis/Infusion    No services have been selected for the patient.              Home Medical Care    No services have been selected for the patient.              Therapy    No services have been selected for the patient.              Community Resources    No services have been selected for the patient.              Community & DME    No services have been selected for the patient.                       Final Discharge Disposition Code: 01 - home or self-care

## 2022-11-04 NOTE — TELEPHONE ENCOUNTER
Can someone please put in an order for Duplex Carotid US Bilateral on Pt that needs 6 mos Hosp F/U w/ Dr. Concepcion. Thank you.

## 2022-11-05 LAB
BH CV XLRA MEAS LEFT DIST CCA EDV: 17 CM/SEC
BH CV XLRA MEAS LEFT DIST CCA PSV: 68 CM/SEC
BH CV XLRA MEAS LEFT DIST ICA EDV: 17.2 CM/SEC
BH CV XLRA MEAS LEFT DIST ICA PSV: 60.8 CM/SEC
BH CV XLRA MEAS LEFT ICA/CCA RATIO: 1.45
BH CV XLRA MEAS LEFT MID CCA EDV: 17.6 CM/SEC
BH CV XLRA MEAS LEFT MID CCA PSV: 69.7 CM/SEC
BH CV XLRA MEAS LEFT MID ICA EDV: 26.1 CM/SEC
BH CV XLRA MEAS LEFT MID ICA PSV: 101 CM/SEC
BH CV XLRA MEAS LEFT PROX CCA EDV: 14.3 CM/SEC
BH CV XLRA MEAS LEFT PROX CCA PSV: 81.8 CM/SEC
BH CV XLRA MEAS LEFT PROX ECA EDV: 6.2 CM/SEC
BH CV XLRA MEAS LEFT PROX ECA PSV: 71.1 CM/SEC
BH CV XLRA MEAS LEFT PROX ICA EDV: 25.2 CM/SEC
BH CV XLRA MEAS LEFT PROX ICA PSV: 83.4 CM/SEC
BH CV XLRA MEAS LEFT PROX SCLA PSV: 99.5 CM/SEC
BH CV XLRA MEAS LEFT VERTEBRAL A EDV: 26.7 CM/SEC
BH CV XLRA MEAS LEFT VERTEBRAL A PSV: 111 CM/SEC
BH CV XLRA MEAS RIGHT DIST CCA EDV: 21.4 CM/SEC
BH CV XLRA MEAS RIGHT DIST CCA PSV: 75.7 CM/SEC
BH CV XLRA MEAS RIGHT DIST ICA EDV: 23 CM/SEC
BH CV XLRA MEAS RIGHT DIST ICA PSV: 91.3 CM/SEC
BH CV XLRA MEAS RIGHT ICA/CCA RATIO: 1.2
BH CV XLRA MEAS RIGHT MID CCA EDV: 17 CM/SEC
BH CV XLRA MEAS RIGHT MID CCA PSV: 74.1 CM/SEC
BH CV XLRA MEAS RIGHT MID ICA EDV: 26.9 CM/SEC
BH CV XLRA MEAS RIGHT MID ICA PSV: 89.2 CM/SEC
BH CV XLRA MEAS RIGHT PROX CCA EDV: 17.6 CM/SEC
BH CV XLRA MEAS RIGHT PROX CCA PSV: 78.5 CM/SEC
BH CV XLRA MEAS RIGHT PROX ECA EDV: 11.8 CM/SEC
BH CV XLRA MEAS RIGHT PROX ECA PSV: 123 CM/SEC
BH CV XLRA MEAS RIGHT PROX ICA EDV: 25.2 CM/SEC
BH CV XLRA MEAS RIGHT PROX ICA PSV: 87.8 CM/SEC
BH CV XLRA MEAS RIGHT PROX SCLA PSV: 196 CM/SEC
BH CV XLRA MEAS RIGHT VERTEBRAL A EDV: 18.9 CM/SEC
BH CV XLRA MEAS RIGHT VERTEBRAL A PSV: 64.1 CM/SEC
LEFT ARM BP: NORMAL MMHG
MAXIMAL PREDICTED HEART RATE: 144 BPM
STRESS TARGET HR: 122 BPM

## 2022-11-05 NOTE — OUTREACH NOTE
Prep Survey    Flowsheet Row Responses   [facility] patient discharged from? [facility]   Is LACE score < 7 ? No   Emergency Room discharge w/ pulse ox? No   Eligibility [facility]   Date of Admission 10/31/22   Date of Discharge 11/04/22   Discharge Disposition Home or Self Care   Discharge diagnosis Cerebrovascular accident (CVA)   Does the patient have one of the following disease processes/diagnoses(primary or secondary)? Stroke   Does the patient have Home health ordered? No   Is there a DME ordered? No   Comments regarding appointments Lives with daughter   Prep survey completed? Yes          FITO ARANDA - Registered Nurse

## 2022-11-07 ENCOUNTER — OFFICE VISIT (OUTPATIENT)
Dept: FAMILY MEDICINE CLINIC | Facility: CLINIC | Age: 77
End: 2022-11-07

## 2022-11-07 ENCOUNTER — TRANSITIONAL CARE MANAGEMENT TELEPHONE ENCOUNTER (OUTPATIENT)
Dept: CALL CENTER | Facility: HOSPITAL | Age: 77
End: 2022-11-07

## 2022-11-07 VITALS
SYSTOLIC BLOOD PRESSURE: 142 MMHG | HEIGHT: 63 IN | HEART RATE: 72 BPM | OXYGEN SATURATION: 98 % | BODY MASS INDEX: 27.29 KG/M2 | RESPIRATION RATE: 18 BRPM | DIASTOLIC BLOOD PRESSURE: 70 MMHG | WEIGHT: 154 LBS

## 2022-11-07 DIAGNOSIS — E78.2 HYPERLIPIDEMIA, MIXED: ICD-10-CM

## 2022-11-07 DIAGNOSIS — I10 HYPERTENSION, ESSENTIAL: ICD-10-CM

## 2022-11-07 DIAGNOSIS — I63.9 CEREBROVASCULAR ACCIDENT (CVA), UNSPECIFIED MECHANISM: Primary | ICD-10-CM

## 2022-11-07 DIAGNOSIS — F33.1 MAJOR DEPRESSIVE DISORDER, RECURRENT, MODERATE: ICD-10-CM

## 2022-11-07 PROCEDURE — 99214 OFFICE O/P EST MOD 30 MIN: CPT | Performed by: PHYSICIAN ASSISTANT

## 2022-11-07 RX ORDER — FLUOXETINE HYDROCHLORIDE 40 MG/1
40 CAPSULE ORAL DAILY
Qty: 90 CAPSULE | Refills: 1 | Status: SHIPPED | OUTPATIENT
Start: 2022-11-07

## 2022-11-07 NOTE — PROGRESS NOTES
"Chief Complaint  Hospital Follow Up Visit ([facility]/Date of Admission 10/31/22/Date of Discharge 11/04/22/Discharge diagnosis Cerebrovascular accident )    Subjective          Alysia Aguayo presents to [facility]  History of Present Illness  Patient in today for hospital follow-up.  She was admitted to [facility] in [place] on 10/31/2022 and discharged on 11/4/2022 with a diagnosis of CVA.  She [place] with occurrences of changes in speech and left-sided weakness.  Those symptoms had  resolved by the time she got there.  She was noted to have an occlusion on carotid ultrasound and stent was placed. She has been feeling well since hospital discharge. Groin site has not gotten infected. States bp has been running normal at home. Denies any headaches, chest pain, shortness of breath or return of any CVA symptoms. Is eating and drinking well. She has f/up next month with neurology.       Objective   Vital Signs:   /70   Pulse 72   Resp 18   Ht 160 cm (63\")   Wt 69.9 kg (154 lb)   SpO2 98%   BMI 27.28 kg/m²     Body mass index is 27.28 kg/m².    Review of Systems    Past History:  Medical History: has a past medical history of Arthritis, Back pain, Carotid artery disease ([facility]), Coronary artery disease, Depression, GERD (gastroesophageal reflux disease), History of alcoholism ([facility]), Hyperlipidemia, Hypertension, Kidney disease, Neck pain, Spastic colon, Spinal stenosis, Stroke ([facility]) (2022), Wears dentures, and Wears reading eyeglasses.   Surgical History: has a past surgical history that includes Hysterectomy; Cholecystectomy; Tubal ligation; Lumbar discectomy (07/10/2014); Cataract extraction (Bilateral); Colonoscopy (2016); Esophageal dilation; Coronary stent placement (2010); Cardiac catheterization (2010); lumbar laminectomy discectomy decompression (N/A, 10/17/2019); and Upper gastrointestinal endoscopy.   Family History: family history " includes Cancer in her father; Coronary artery disease in her mother; Emphysema in her mother; Heart disease in her mother; Lung cancer in her father.   Social History: reports that she quit smoking about 22 years ago. Her smoking use included cigarettes. She has a 60.00 pack-year smoking history. She has never used smokeless tobacco. She reports that she does not drink alcohol and does not use drugs.      Current Outpatient Medications:   •  acetaminophen (TYLENOL) 325 MG tablet, Take 650 mg by mouth Every 4 (Four) Hours As Needed for Mild Pain  or Moderate Pain ., Disp: , Rfl:   •  amLODIPine (NORVASC) 5 MG tablet, Take 2.5 mg by mouth 2 (Two) Times a Day., Disp: , Rfl:   •  aspirin 81 MG chewable tablet, Chew 1 tablet Daily., Disp: 30 tablet, Rfl: 1  •  atorvastatin (LIPITOR) 80 MG tablet, Take 1 tablet by mouth Every Night., Disp: 90 tablet, Rfl: 1  •  clopidogrel (PLAVIX) 75 MG tablet, Take 1 tablet by mouth Daily., Disp: 30 tablet, Rfl: 1  •  Diclofenac Sodium (VOLTAREN) 1 % gel gel, Apply 4 g topically to the appropriate area as directed 4 (Four) Times a Day As Needed (pain)., Disp: 4 g, Rfl: 5  •  Esomeprazole Magnesium (NEXIUM PO), Take 75 mg by mouth Before Breakfast., Disp: , Rfl:   •  FLUoxetine (PROzac) 40 MG capsule, Take 1 capsule by mouth Daily., Disp: 90 capsule, Rfl: 1  •  glycopyrrolate (ROBINUL) 1 MG tablet, Take 1 mg by mouth 2 (Two) Times a Day., Disp: , Rfl:   •  meclizine (ANTIVERT) 25 MG tablet, Take 1 tablet by mouth 3 (Three) Times a Day As Needed for Dizziness., Disp: 90 tablet, Rfl: 1  •  meclizine (ANTIVERT) 25 MG tablet, Take 12.5 mg by mouth 3 (Three) Times a Day As Needed for Dizziness., Disp: , Rfl:   Allergies: Penicillins, Codeine, and Imodium a-d [loperamide hcl]    Physical Exam  Constitutional:       Appearance: Normal appearance.   HENT:      Right Ear: Tympanic membrane normal.      Left Ear: Tympanic membrane normal.      Mouth/Throat:      Pharynx: Oropharynx is clear.    Eyes:      Conjunctiva/sclera: Conjunctivae normal.      Pupils: Pupils are equal, round, and reactive to light.   Cardiovascular:      Rate and Rhythm: Normal rate and regular rhythm.      Heart sounds: Normal heart sounds.   Pulmonary:      Effort: Pulmonary effort is normal.      Breath sounds: Normal breath sounds.   Abdominal:      Palpations: Abdomen is soft.      Tenderness: There is no abdominal tenderness.   Skin:     Comments: Bruise noted to right groin area that appears to be healing well- no redness/ heat noted.    Neurological:      Mental Status: She is oriented to person, place, and time.      Cranial Nerves: Cranial nerves 2-12 are intact.      Motor: Motor function is intact.      Coordination: Coordination is intact.      Gait: Gait is intact.   Psychiatric:         Mood and Affect: Mood normal.         Behavior: Behavior normal.             Assessment and Plan   Diagnoses and all orders for this visit:    1. Cerebrovascular accident (CVA), unspecified mechanism (HCC) (Primary)  Patient to keep f/up with neurology as directed. To ER for any return of symptoms if needed.   2. Hypertension, essential  Continue current medication- she states bp doing well at home- she monitors closely- rtc if staying elevated.   3. Hyperlipidemia, mixed  Continue atorvastatin. Encouraged healthy diet and exercise.   4. Major depressive disorder, recurrent, moderate (HCC)  Refilled fluoxetine. Patient states continues to work well for her depression, denies side effects. RTC prior to recheck as needed.   Other orders  -     FLUoxetine (PROzac) 40 MG capsule; Take 1 capsule by mouth Daily.  Dispense: 90 capsule; Refill: 1            Follow Up   No follow-ups on file.  Patient was given instructions and counseling regarding her condition or for health maintenance advice. Please see specific information pulled into the AVS if appropriate.     Yesica Zhu PA-C

## 2022-11-07 NOTE — OUTREACH NOTE
Call Center TCM Note    Flowsheet Row Responses   [facility] patient discharged from? [place]   Does the patient have one of the following disease processes/diagnoses(primary or secondary)? Stroke   TCM attempt successful? Yes   Discharge diagnosis Cerebrovascular accident (CVA)   TCM call completed? Yes   Wrap up additional comments PCP Yesica JOHNSON. Patient completed office appt today 11/7/22 with PCP. This appt within 2 business days of discharge fulfills TCM requirement, no call needed.           Bethany Sam RN    11/7/2022, 15:27 EST

## 2022-11-15 ENCOUNTER — READMISSION MANAGEMENT (OUTPATIENT)
Dept: CALL CENTER | Facility: HOSPITAL | Age: 77
End: 2022-11-15

## 2022-11-15 NOTE — OUTREACH NOTE
Stroke Week 2 Survey    Flowsheet Row Responses   [facility] patient discharged from? [place]   Does the patient have one of the following disease processes/diagnoses(primary or secondary)? Stroke   Week 2 attempt successful? Yes   Call start time 1420   Call end time 1424   Discharge diagnosis Cerebrovascular accident (CVA)   Meds reviewed with patient/caregiver? Yes   Is the patient having any side effects they believe may be caused by any medication additions or changes? No   Does the patient have all medications ordered at discharge? Yes   Is the patient taking all medications as directed (includes completed medication regime)? Yes   Does the patient have a primary care provider?  Yes   Has the patient kept scheduled appointments due by today? Yes   Psychosocial issues? No   Does the patient require any assistance with activities of daily living such as eating, bathing, dressing, walking, etc.? No   Does the patient have any residual symptoms from stroke/TIA? No   Does the patient understand the diet ordered at discharge? No   What is the patient's perception of their health status since discharge? Improving   Nursing interventions Nurse provided patient education   Is the patient able to teach back FAST for Stroke? B alance: Watch for sudden loss of balance, E yes: Check for vision loss, F ace: Look for an uneven smile, A rm: Check if one arm is weak, S peech: Listen for slurred speech, T damian: Call 9-1-1 right away   Is the patient/caregiver able to teach back the risk factors for a stroke? High blood pressure-goal below 120/80, Diabetes, High Cholesterol, Physical inactivity and obesity, History of TIAs, Sleep apnea   Is the patient/caregiver able to teach back signs and symptoms related to disease process for when to call PCP? Yes   Is the patient/caregiver able to teach back signs and symptoms related to disease process for when to call 911? Yes   If the patient is a current smoker, are they able to  teach back resources for cessation? Not a smoker   Is the patient/caregiver able to teach back the hierarchy of who to call/visit for symptoms/problems? PCP, Specialist, Home health nurse, Urgent Care, ED, 911 Yes   Additional teach back comments States she is doing well and just has a slight heachache.  Believes it may be due to the weather.  Has follow up with Neuro on 12/5    Week 2 call completed? Yes   Graduated/Revoked comments Denies questions or needs at this time.          BHARGAVI POLLACK - Licensed Nurse

## 2022-11-28 ENCOUNTER — TELEPHONE (OUTPATIENT)
Dept: FAMILY MEDICINE CLINIC | Facility: CLINIC | Age: 77
End: 2022-11-28

## 2022-11-28 NOTE — TELEPHONE ENCOUNTER
Caller: Alysia Aguayo    Relationship: Self    Best call back number:     [phone]    What medication are you requesting:     PATIENT REQUESTED AN ANTIBIOTIC AND COUGH MEDICATION    What are your current symptoms:     COUGH  HEADACHE  CHEST CONGESTION  RIB PAIN FROM COUGHING     How long have you been experiencing symptoms:     Saturday, 11/26/22    Have you had these symptoms before:    [x] Yes  [] No    Have you been treated for these symptoms before:   [x] Yes  [] No    If a prescription is needed, what is your preferred pharmacy and phone number:      [place], [place]    TELEPHONE CONTACT:    [phone]    HUANG MAGALLON

## 2022-11-28 NOTE — TELEPHONE ENCOUNTER
Spoke with patient. Advised patient she would need an office visit to get medication. Patient declined making an appt.

## 2022-11-29 ENCOUNTER — OFFICE VISIT (OUTPATIENT)
Dept: FAMILY MEDICINE CLINIC | Facility: CLINIC | Age: 77
End: 2022-11-29

## 2022-11-29 VITALS
OXYGEN SATURATION: 98 % | HEIGHT: 63 IN | BODY MASS INDEX: 27.64 KG/M2 | HEART RATE: 83 BPM | TEMPERATURE: 98.3 F | DIASTOLIC BLOOD PRESSURE: 64 MMHG | WEIGHT: 156 LBS | SYSTOLIC BLOOD PRESSURE: 100 MMHG

## 2022-11-29 DIAGNOSIS — J10.1 INFLUENZA A WITH RESPIRATORY MANIFESTATIONS: Primary | ICD-10-CM

## 2022-11-29 DIAGNOSIS — R05.1 ACUTE COUGH: ICD-10-CM

## 2022-11-29 LAB
EXPIRATION DATE: ABNORMAL
FLUAV AG UPPER RESP QL IA.RAPID: DETECTED
FLUBV AG UPPER RESP QL IA.RAPID: NOT DETECTED
INTERNAL CONTROL: ABNORMAL
Lab: ABNORMAL
SARS-COV-2 AG UPPER RESP QL IA.RAPID: NOT DETECTED

## 2022-11-29 PROCEDURE — 99213 OFFICE O/P EST LOW 20 MIN: CPT | Performed by: PHYSICIAN ASSISTANT

## 2022-11-29 PROCEDURE — 87428 SARSCOV & INF VIR A&B AG IA: CPT | Performed by: PHYSICIAN ASSISTANT

## 2022-11-29 RX ORDER — PREDNISONE 20 MG/1
TABLET ORAL
Qty: 9 TABLET | Refills: 0 | Status: SHIPPED | OUTPATIENT
Start: 2022-11-29 | End: 2022-12-07

## 2022-11-29 RX ORDER — BENZONATATE 200 MG/1
200 CAPSULE ORAL 3 TIMES DAILY PRN
Qty: 30 CAPSULE | Refills: 0 | Status: SHIPPED | OUTPATIENT
Start: 2022-11-29 | End: 2022-12-09

## 2022-11-29 NOTE — PROGRESS NOTES
"Chief Complaint  Cough (X 3 days. Son-in-law and grandson tested + for Flu. Congestion in chest. Coughing so much can't sleep)    Subjective        Alysia Aguayo presents to [facility]  History of Present Illness  Patient states that she started feeling bad about 4 days ago.  She states that she has been having a lot of cough.  Today she has been a bit more dizzy and feeling weak.  She states that the cough is keeping her up at night.  She states that she is on no inhalers currently.  She takes Mucinex and Tylenol.  She denies any fever as well as any runny nose or congestion.  She states she had some headache and diarrhea.  She states that her son-in-law and grandson were sick last week and has been diagnosed with the flu and she lives with them.      Objective   Vital Signs:  /64   Pulse 83   Temp 98.3 °F (36.8 °C)   Ht 160 cm (63\")   Wt 70.8 kg (156 lb)   SpO2 98%   BMI 27.63 kg/m²   Estimated body mass index is 27.63 kg/m² as calculated from the following:    Height as of this encounter: 160 cm (63\").    Weight as of this encounter: 70.8 kg (156 lb).          Physical Exam  Vitals and nursing note reviewed.   Constitutional:       Appearance: Normal appearance.   HENT:      Head: Normocephalic and atraumatic.      Right Ear: Tympanic membrane, ear canal and external ear normal.      Left Ear: Tympanic membrane, ear canal and external ear normal.      Nose: Congestion present.      Mouth/Throat:      Mouth: Mucous membranes are moist.      Pharynx: Posterior oropharyngeal erythema present.   Cardiovascular:      Rate and Rhythm: Normal rate and regular rhythm.      Heart sounds: Normal heart sounds.   Pulmonary:      Effort: Pulmonary effort is normal.      Breath sounds: Normal breath sounds.   Neurological:      General: No focal deficit present.      Mental Status: She is alert.   Psychiatric:         Mood and Affect: Mood normal.        Result Review :     POCT " SARS-CoV-2 Antigen ELSIE (11/29/2022 14:28)              Assessment and Plan   Diagnoses and all orders for this visit:    1. Influenza A with respiratory manifestations (Primary)  -     predniSONE (DELTASONE) 20 MG tablet; 2 tab po qd x 3 days then 1 tab po qd x 3 days  Dispense: 9 tablet; Refill: 0  -     benzonatate (TESSALON) 200 MG capsule; Take 1 capsule by mouth 3 (Three) Times a Day As Needed for Cough for up to 10 days.  Dispense: 30 capsule; Refill: 0  Discussed positive flu test with patient.  We will place her on Tessalon Perles as well as prednisone to help decrease her cough.  We discussed that flu usually last 7 to 10 days and encouraged fluid and rest.  2. Acute cough  -     POCT SARS-CoV-2 Antigen ELSIE             Follow Up   No follow-ups on file.  Patient was given instructions and counseling regarding her condition or for health maintenance advice. Please see specific information pulled into the AVS if appropriate.

## 2022-12-07 ENCOUNTER — OFFICE VISIT (OUTPATIENT)
Dept: NEUROLOGY | Facility: CLINIC | Age: 77
End: 2022-12-07

## 2022-12-07 VITALS
BODY MASS INDEX: 27.82 KG/M2 | TEMPERATURE: 98.6 F | HEIGHT: 63 IN | SYSTOLIC BLOOD PRESSURE: 124 MMHG | DIASTOLIC BLOOD PRESSURE: 64 MMHG | OXYGEN SATURATION: 96 % | WEIGHT: 157 LBS | HEART RATE: 86 BPM

## 2022-12-07 DIAGNOSIS — I10 ESSENTIAL HYPERTENSION: ICD-10-CM

## 2022-12-07 DIAGNOSIS — Z86.73 H/O: CVA (CEREBROVASCULAR ACCIDENT): Primary | ICD-10-CM

## 2022-12-07 DIAGNOSIS — E78.2 MIXED HYPERLIPIDEMIA: ICD-10-CM

## 2022-12-07 PROCEDURE — 99214 OFFICE O/P EST MOD 30 MIN: CPT | Performed by: CLINICAL NURSE SPECIALIST

## 2022-12-07 RX ORDER — ATORVASTATIN CALCIUM 80 MG/1
80 TABLET, FILM COATED ORAL NIGHTLY
Qty: 90 TABLET | Refills: 2 | Status: SHIPPED | OUTPATIENT
Start: 2022-12-07

## 2022-12-07 NOTE — PROGRESS NOTES
Follow Up Office Visit      Encounter Date: 2022   Patient Name: Alysia Aguayo  : 1945   MRN: [id number]   PCP: Yesica Zhu PA-C    Referring Provider: No ref. provider found     Chief Complaint:    Chief Complaint   Patient presents with   • Follow-up       History of Present Illness:   2022- Alysia Aguayo is a 76 y.o. female with known medical diagnoses of arthritis, anxiety, CAD status post stent (Dr. Bell), GERD, hyperlipidemia, hypertension, kidney disease, spastic colon, as well as a history of tobacco and alcohol abuse.  She presented to [facility] ED on 2022 with concerns of left arm paresthesias, left facial droop, left hand incoordination, and gait instability. In the ED, all of her symptoms resolved other than the left arm paresthesia and incoordination in her hand.      She presents to clinic today for a hospital follow up and to establish care.      Patient states that since being discharged from the hospital she has been doing fairly well. She states that she does continue to have some difficulty with ambulation and notes that she is unable to get to PT as she cannot drive and her granddaughter owns two businesses and has a difficult time getting off work to take her to appointments. She is requesting a home health referral to help improve her strength. She notes that she also has episodes of dizziness several times a day.     2022-patient presents to clinic today in follow-up for her previous right MCA stroke in  of this year.  She is accompanied today by her daughter.  Patient presented back to [facility] on  of this year with waxing and waning left lower extremity weakness and dizziness.  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 Concepcion performed a right innominate artery stent at that time.  Patient will be on DAPT and atorvastatin lifelong.  She will follow-up with him in about 6 months for a follow-up carotid  ultrasound per his orders.  She tells me she has had no new neurologic symptoms, no further dizziness or left lower extremity weakness.  She is recovering from the flu, she was diagnosed on November 30 but feels she is getting better.  She is no longer needing any therapy.  She does not use any devices for ambulation.      Subjective        I have reviewed and the following portions of the patient's history were updated as appropriate: past family history, past medical history, past social history, past surgical history and problem list.    Medications:     Current Outpatient Medications:   •  acetaminophen (TYLENOL) 325 MG tablet, Take 650 mg by mouth Every 4 (Four) Hours As Needed for Mild Pain  or Moderate Pain ., Disp: , Rfl:   •  amLODIPine (NORVASC) 5 MG tablet, Take 2.5 mg by mouth 2 (Two) Times a Day., Disp: , Rfl:   •  aspirin 81 MG chewable tablet, Chew 1 tablet Daily., Disp: 30 tablet, Rfl: 1  •  atorvastatin (LIPITOR) 80 MG tablet, Take 1 tablet by mouth Every Night., Disp: 90 tablet, Rfl: 2  •  benzonatate (TESSALON) 200 MG capsule, Take 1 capsule by mouth 3 (Three) Times a Day As Needed for Cough for up to 10 days., Disp: 30 capsule, Rfl: 0  •  clopidogrel (PLAVIX) 75 MG tablet, Take 1 tablet by mouth Daily., Disp: 30 tablet, Rfl: 1  •  Diclofenac Sodium (VOLTAREN) 1 % gel gel, Apply 4 g topically to the appropriate area as directed 4 (Four) Times a Day As Needed (pain)., Disp: 4 g, Rfl: 5  •  Esomeprazole Magnesium (NEXIUM PO), Take 75 mg by mouth Before Breakfast., Disp: , Rfl:   •  FLUoxetine (PROzac) 40 MG capsule, Take 1 capsule by mouth Daily., Disp: 90 capsule, Rfl: 1    Allergies:   Allergies   Allergen Reactions   • Penicillins Hives   • Codeine Nausea And Vomiting   • Imodium A-D [Loperamide Hcl] Nausea And Vomiting       Objective     Physical Exam:   Vital Signs:   Vitals:    12/07/22 1355   BP: 124/64   Pulse: 86   Temp: 98.6 °F (37 °C)   SpO2: 96%   Weight: 71.2 kg (157 lb)   Height: 160  "cm (62.99\")     Body mass index is 27.82 kg/m².    Physical Exam  Vitals reviewed.   Constitutional:       Appearance: Normal appearance.   HENT:      Head: Normocephalic.      Mouth/Throat:      Mouth: Mucous membranes are moist.   Eyes:      Extraocular Movements: Extraocular movements intact.      Pupils: Pupils are equal, round, and reactive to light.   Pulmonary:      Effort: Pulmonary effort is normal.   Skin:     General: Skin is warm and dry.   Neurological:      General: No focal deficit present.      Motor: Motor strength is normal.      Coordination: Coordination is intact.      Deep Tendon Reflexes:      Reflex Scores:       Bicep reflexes are 2+ on the right side and 2+ on the left side.       Brachioradialis reflexes are 2+ on the right side and 2+ on the left side.       Patellar reflexes are 2+ on the right side and 2+ on the left side.  Psychiatric:         Mood and Affect: Mood normal.         Speech: Speech normal.       Neurological Exam  Mental Status  Awake, alert and oriented to person, place and time. Speech is normal. Language is fluent with no aphasia. Able to perform serial calculations. Fund of knowledge is appropriate for level of education.    Cranial Nerves  CN II: Visual fields full to confrontation.  CN III, IV, VI: Extraocular movements intact bilaterally. Pupils equal round and reactive to light bilaterally.  CN V: Facial sensation is normal.  CN VII: Full and symmetric facial movement.  CN VIII: Hearing appears intact.  CN IX, X: Palate elevates symmetrically  CN XI: Shoulder shrug strength is normal.  CN XII: Tongue midline without atrophy or fasciculations.    Motor   Strength is 5/5 throughout all four extremities.    Sensory  Light touch is normal in upper and lower extremities.     Reflexes                                            Right                      Left  Brachioradialis                    2+                         2+  Biceps                                 2+      "                    2+  Patellar                                2+                         2+    Coordination    Finger-to-nose, rapid alternating movements and heel-to-shin normal bilaterally without dysmetria.    Gait  Casual gait is normal including stance, stride, and arm swing.        Procedures     New imaging and labs from hospitalization 2022-carotid duplex showed right innominate artery stenosis  MRI brain was negative for new infarct  Labs included hemoglobin 11.2, hematocrit 34.7, platelets 235, hemoglobin A1c 5.20, LDL 59, AST 21, ALT 14, B12 385    NIH Stroke Scale    1a  Level of consciousness: 0=alert; keenly responsive   1b. LOC questions:  0=Answers both questions correctly    1c. LOC commands: 0=Performs both tasks correctly   2.  Best Gaze: 0=normal   3. Visual: 0=No visual loss   4. Facial Palsy: 0=Normal symmetric movement   5a. Motor left arm: 0=No drift, limb holds 90 (or 45) degrees for full 10 seconds   5b.  Motor right arm: 0=No drift, limb holds 90 (or 45) degrees for full 10 seconds   6a. Motor left le=No drift, limb holds 90 (or 45) degrees for full 10 seconds   6b  Motor right le=No drift, limb holds 90 (or 45) degrees for full 10 seconds   7. Limb Ataxia: 0=Absent   8.  Sensory: 0=Normal; no sensory loss   9. Best Language:  0=No aphasia, normal   10. Dysarthria: 0=Normal   11. Extinction and Inattention: 0=No abnormality    Total:   0       MODIFIED PALOMA SCALE (to be assessed for each patient having history of stroke) []Stroke history but not assessed  [x]0: No symptoms at all  []1: No significant disability despite symptoms  []2: Slight disability  []3: Moderate disability  []4: Moderately severe disability  []5: Severe disability  []6: Death       Assessment / Plan      Assessment/Plan:   Diagnoses and all orders for this visit:    1. H/O: R MCA CVA (cerebrovascular accident) (Primary)        -Patient will be on aspirin 81 mg and Plavix 75 mg for life        -Reviewed  signs and symptoms of stroke and when to call 911        -She will follow-up with Dr. Concepcion with carotid ultrasound in 6 months per his order    2. Mixed hyperlipidemia  -     atorvastatin (LIPITOR) 80 MG tablet; Take 1 tablet by mouth Every Night.  Dispense: 90 tablet; Refill: 2  -     Previous LDL 59, patient will need to be on high intensity statin lifelong due to atherosclerosis  -     Primary care to follow and monitor lipid panel and liver enzymes    3. Essential hypertension        -    Blood pressure today 124/64        -    Goal systolic blood pressure 120-140, patient will continue to work closely with primary care to maintain this goal    Plan as above was discussed with patient and daughter.  Again patient will need to be on DAPT and statin therapy lifelong.  All questions answered at this time.  We will see patient back in about 4 months or sooner if needed.       Follow Up:   Return in about 4 months (around 4/7/2023).    HUE Haji  [facility]

## 2022-12-10 ENCOUNTER — OFFICE VISIT (OUTPATIENT)
Dept: FAMILY MEDICINE CLINIC | Facility: CLINIC | Age: 77
End: 2022-12-10

## 2022-12-10 VITALS
WEIGHT: 154 LBS | BODY MASS INDEX: 27.29 KG/M2 | SYSTOLIC BLOOD PRESSURE: 125 MMHG | DIASTOLIC BLOOD PRESSURE: 60 MMHG | OXYGEN SATURATION: 96 % | HEIGHT: 63 IN | HEART RATE: 77 BPM

## 2022-12-10 DIAGNOSIS — J40 BRONCHITIS: Primary | ICD-10-CM

## 2022-12-10 PROCEDURE — 99214 OFFICE O/P EST MOD 30 MIN: CPT | Performed by: FAMILY MEDICINE

## 2022-12-10 RX ORDER — PREDNISONE 20 MG/1
TABLET ORAL
Qty: 18 TABLET | Refills: 0 | Status: SHIPPED | OUTPATIENT
Start: 2022-12-10 | End: 2022-12-21

## 2022-12-10 RX ORDER — LEVOFLOXACIN 500 MG/1
500 TABLET, FILM COATED ORAL DAILY
Qty: 10 TABLET | Refills: 0 | Status: SHIPPED | OUTPATIENT
Start: 2022-12-10 | End: 2022-12-21

## 2022-12-10 RX ORDER — DEXTROMETHORPHAN HYDROBROMIDE AND PROMETHAZINE HYDROCHLORIDE 15; 6.25 MG/5ML; MG/5ML
5 SYRUP ORAL 4 TIMES DAILY PRN
Qty: 120 ML | Refills: 0 | Status: SHIPPED | OUTPATIENT
Start: 2022-12-10

## 2022-12-10 NOTE — PROGRESS NOTES
Follow Up Office Visit      Date of Visit:  12/10/2022   Patient Name: Alysia Aguayo  : 1945   MRN: [id number]     Chief Complaint:    Chief Complaint   Patient presents with   • URI     Had flu x 3 weeks ago   • Cough       History of Present Illness: Alysia Aguayo is a 77 y.o. female who is here today for follow up.  Patient comes in for evaluation of cough and congestion.  Patient was initially sick 3 weeks ago and diagnosed with influenza.  Symptoms have continued.  Cough is worse as well as wheezing.  No current fever.  No major shortness of breath.        Subjective      Review of Systems:   Review of Systems   Constitutional: Positive for fatigue. Negative for fever.   HENT: Negative for congestion and ear pain.    Respiratory: Positive for cough and wheezing. Negative for apnea, chest tightness and shortness of breath.    Cardiovascular: Negative for chest pain.   Gastrointestinal: Negative for abdominal pain, constipation, diarrhea and nausea.   Musculoskeletal: Negative for arthralgias.   Psychiatric/Behavioral: Negative for depressed mood and stress.       Past Medical History:   Past Medical History:   Diagnosis Date   • Arthritis    • Back pain    • Carotid artery disease ([facility] [place])    • Coronary artery disease    • Depression    • GERD (gastroesophageal reflux disease)    • History of alcoholism (Formerly [facility] [place])    • Hyperlipidemia    • Hypertension    • Kidney disease     was stage 4 but taking excessive amounts of ibuprofen, f/u with nephrologist at the time, resolved since and was to see nephrologist as needed    • Neck pain    • Spastic colon    • Spinal stenosis    • Stroke (Former[facility] [place])    • Wears dentures    • Wears reading eyeglasses        Past Surgical History:   Past Surgical History:   Procedure Laterality Date   • CARDIAC CATHETERIZATION  2010    x2    • CATARACT EXTRACTION Bilateral    • CHOLECYSTECTOMY     • COLONOSCOPY  2016   • CORONARY STENT PLACEMENT  2010    x3    • ESOPHAGEAL  DILATATION     • HYSTERECTOMY     • INTERVENTIONAL RADIOLOGY PROCEDURE N/A 11/3/2022    Procedure: IR angiogram vertebral cervical intracranial;  Surgeon: Carlos Concepcion MD;  Location:  [facility]ATH [facility]IVE [facility];  Service: Interventional Radiology;  Laterality: N/A;  w/ possible stenting   • LUMBAR DISCECTOMY  07/10/2014    Rt- L1/2 Dr. Dionicio Pollard    • LUMBAR LAMINECTOMY DISCECTOMY DECOMPRESSION N/A 10/17/2019    Procedure: LUMBAR LAMINECTOMY L3-5;  Surgeon: Dionicio Pollard MD;  Location:  [facility] OR;  Service: Neurosurgery   • TUBAL ABDOMINAL LIGATION     • UPPER GASTROINTESTINAL ENDOSCOPY         Family History:   Family History   Problem Relation Age of Onset   • Heart disease Mother    • Emphysema Mother    • Coronary artery disease Mother    • Cancer Father    • Lung cancer Father    • Colon cancer Neg Hx        Social History:   Social History     Socioeconomic History   • Marital status:    Tobacco Use   • Smoking status: Former     Packs/day: 1.50     Years: 40.00     Pack years: 60.00     Types: Cigarettes     Quit date: 2000     Years since quittin.9     Passive exposure: Past   • Smokeless tobacco: Never   Vaping Use   • Vaping Use: Never used   Substance and Sexual Activity   • Alcohol use: No     Comment: hasn't used since , hx of alcoholism   • Drug use: No   • Sexual activity: Defer       Medications:     Current Outpatient Medications:   •  acetaminophen (TYLENOL) 325 MG tablet, Take 650 mg by mouth Every 4 (Four) Hours As Needed for Mild Pain  or Moderate Pain ., Disp: , Rfl:   •  amLODIPine (NORVASC) 5 MG tablet, Take 2.5 mg by mouth 2 (Two) Times a Day., Disp: , Rfl:   •  aspirin 81 MG chewable tablet, Chew 1 tablet Daily., Disp: 30 tablet, Rfl: 1  •  atorvastatin (LIPITOR) 80 MG tablet, Take 1 tablet by mouth Every Night., Disp: 90 tablet, Rfl: 2  •  clopidogrel (PLAVIX) 75 MG tablet, Take 1 tablet by mouth Daily., Disp: 30 tablet, Rfl: 1  •  Diclofenac Sodium  "(VOLTAREN) 1 % gel gel, Apply 4 g topically to the appropriate area as directed 4 (Four) Times a Day As Needed (pain)., Disp: 4 g, Rfl: 5  •  Esomeprazole Magnesium (NEXIUM PO), Take 75 mg by mouth Before Breakfast., Disp: , Rfl:   •  FLUoxetine (PROzac) 40 MG capsule, Take 1 capsule by mouth Daily., Disp: 90 capsule, Rfl: 1  •  levoFLOXacin (Levaquin) 500 MG tablet, Take 1 tablet by mouth Daily., Disp: 10 tablet, Rfl: 0  •  predniSONE (DELTASONE) 20 MG tablet, 3 po qd x3 d then 2 po qd x3d then 1 po qd x3d, Disp: 18 tablet, Rfl: 0  •  promethazine-dextromethorphan (PROMETHAZINE-DM) 6.25-15 MG/5ML syrup, Take 5 mL by mouth 4 (Four) Times a Day As Needed for Cough., Disp: 120 mL, Rfl: 0    Allergies:   Allergies   Allergen Reactions   • Penicillins Hives   • Codeine Nausea And Vomiting   • Imodium A-D [Loperamide Hcl] Nausea And Vomiting       Objective     Physical Exam:  Vital Signs:   Vitals:    12/10/22 1139   BP: 125/60   BP Location: Left arm   Patient Position: Sitting   Cuff Size: Adult   Pulse: 77   SpO2: 96%   Weight: 69.9 kg (154 lb)   Height: 160 cm (63\")     Body mass index is 27.28 kg/m².     Physical Exam  Vitals and nursing note reviewed.   Constitutional:       Appearance: She is ill-appearing.   HENT:      Head: Normocephalic and atraumatic.   Cardiovascular:      Rate and Rhythm: Normal rate and regular rhythm.   Pulmonary:      Breath sounds: Decreased air movement present. Wheezing present.   Neurological:      General: No focal deficit present.      Mental Status: She is alert and oriented to person, place, and time.   Psychiatric:         Mood and Affect: Mood normal.         Procedures      Assessment / Plan      Assessment/Plan:   Diagnoses and all orders for this visit:    1. Bronchitis (Primary)    Other orders  -     levoFLOXacin (Levaquin) 500 MG tablet; Take 1 tablet by mouth Daily.  Dispense: 10 tablet; Refill: 0  -     predniSONE (DELTASONE) 20 MG tablet; 3 po qd x3 d then 2 po qd x3d " then 1 po qd x3d  Dispense: 18 tablet; Refill: 0  -     promethazine-dextromethorphan (PROMETHAZINE-DM) 6.25-15 MG/5ML syrup; Take 5 mL by mouth 4 (Four) Times a Day As Needed for Cough.  Dispense: 120 mL; Refill: 0         With patient being sick for 3 weeks and brings up the possibility now bronchitis or a pneumonia from her initial infection with the flu.  Gave antibiotics steroids and cough medication.  Unable to do chest x-ray here on a Saturday.    Follow Up:   No follow-ups on file.    Jean Edouard  [facility] [place]

## 2022-12-16 ENCOUNTER — TELEPHONE (OUTPATIENT)
Dept: FAMILY MEDICINE CLINIC | Facility: CLINIC | Age: 77
End: 2022-12-16

## 2022-12-16 NOTE — TELEPHONE ENCOUNTER
Pt called stating the antibiotic she was put on last Saturday 12/10 was causing her sever tendinitis in her arms,elbowns,knees and shoulders. She stopped taking it on 12/13 and said she is still hurting, pt requesting a call back

## 2022-12-19 NOTE — TELEPHONE ENCOUNTER
If she still having the discomfort, it would be best that she is actually seen.  It is possible it was due to the antibiotic or possibly something else.

## 2022-12-21 ENCOUNTER — OFFICE VISIT (OUTPATIENT)
Dept: FAMILY MEDICINE CLINIC | Facility: CLINIC | Age: 77
End: 2022-12-21

## 2022-12-21 VITALS
SYSTOLIC BLOOD PRESSURE: 130 MMHG | DIASTOLIC BLOOD PRESSURE: 62 MMHG | HEIGHT: 63 IN | BODY MASS INDEX: 28.53 KG/M2 | WEIGHT: 161 LBS

## 2022-12-21 DIAGNOSIS — J40 BRONCHITIS: ICD-10-CM

## 2022-12-21 DIAGNOSIS — M25.50 ARTHRALGIA OF MULTIPLE JOINTS: Primary | ICD-10-CM

## 2022-12-21 PROCEDURE — 99213 OFFICE O/P EST LOW 20 MIN: CPT | Performed by: PHYSICIAN ASSISTANT

## 2022-12-21 NOTE — PROGRESS NOTES
"Chief Complaint  Arthritis    Subjective        Alysia Aguayo presents to [facility]  History of Present Illness  Patient states that she was seen in our clinic on 1216 and placed on Levaquin and prednisone for suspected pneumonia.  She states that she took medication for about 3 days and then began to have pain in her elbows shoulders and knees.  She states that she was having a lot of pain that she could not move.  She states that she went up taking about 5 days of the antibiotic and took the steroids until she had about 2 days left when she quit both medications.  She was suspicious that the Levaquin may be causing her pain.  She states that her chest congestion and cough are much better but still coughs on occasion.  She states that she has history of arthritis but not in the spots that are bothering her.  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  At that time she had have cortisone shots.  She states that she has not had any improvement in her joint pain since off the medication she continues aspirin and Plavix daily.      Objective   Vital Signs:  /62 (BP Location: Left arm, Patient Position: Sitting, Cuff Size: Adult)   Ht 160 cm (63\")   Wt 73 kg (161 lb)   BMI 28.52 kg/m²   Estimated body mass index is 28.52 kg/m² as calculated from the following:    Height as of this encounter: 160 cm (63\").    Weight as of this encounter: 73 kg (161 lb).          Physical Exam  Vitals and nursing note reviewed.   Constitutional:       Appearance: Normal appearance. She is normal weight.   HENT:      Head: Normocephalic and atraumatic.      Right Ear: Tympanic membrane, ear canal and external ear normal.      Left Ear: Tympanic membrane, ear canal and external ear normal.      Nose: Nose normal.      Mouth/Throat:      Mouth: Mucous membranes are moist.   Cardiovascular:      Rate and Rhythm: Normal rate and " regular rhythm.      Heart sounds: Normal heart sounds.   Pulmonary:      Effort: Pulmonary effort is normal.      Breath sounds: Normal breath sounds.   Musculoskeletal:        Arms:    Neurological:      Mental Status: She is alert.   Psychiatric:         Mood and Affect: Mood normal.        Result Review :       Office Visit with Jean Edouard MD (12/10/2022)           Assessment and Plan   Diagnoses and all orders for this visit:    1. Arthralgia of multiple joints (Primary)  -     diclofenac (VOLTAREN) 50 MG EC tablet; Take 1 tablet by mouth 3 (Three) Times a Day.  Dispense: 60 tablet; Refill: 1  Long discussion with the patient and her daughter about Levaquin.  Usually with Levaquin if you have any joint issues it is only 1 joint at a time.  We feel that since she has had several joints that are painful this is possibly due to viral arthralgias.  We will have her start diclofenac 2-3 times a day to help decrease the inflammation in her joints.  We did advise patient that it can cause ulcers and stomach bleeding.  She is already taking Nexium which should help.  We will also limit her exposure to 2 to 3 weeks.  2. Bronchitis  Improving we will monitor.           Follow Up   No follow-ups on file.  Patient was given instructions and counseling regarding her condition or for health maintenance advice. Please see specific information pulled into the AVS if appropriate.

## 2023-02-03 NOTE — ADDENDUM NOTE
Addended by: KATHY CHAPPELL on: 11/4/2022 04:07 PM     Modules accepted: Orders    
Addended by: RIKA MEDINA on: 11/4/2022 04:28 PM     Modules accepted: Orders    
no

## 2023-05-08 ENCOUNTER — HOSPITAL ENCOUNTER (OUTPATIENT)
Dept: CARDIOLOGY | Facility: HOSPITAL | Age: 78
Discharge: HOME | End: 2023-05-08
Payer: MEDICARE

## 2023-05-08 VITALS — WEIGHT: 160 LBS | HEIGHT: 63 IN | BODY MASS INDEX: 28.35 KG/M2

## 2023-05-08 PROCEDURE — 93880 EXTRACRANIAL BILAT STUDY: CPT

## 2023-05-10 LAB
BH CV XLRA MEAS LEFT DIST CCA EDV: 17.6 CM/SEC
BH CV XLRA MEAS LEFT DIST CCA PSV: 70.4 CM/SEC
BH CV XLRA MEAS LEFT DIST ICA EDV: 33 CM/SEC
BH CV XLRA MEAS LEFT DIST ICA PSV: 137 CM/SEC
BH CV XLRA MEAS LEFT ICA/CCA RATIO: 1.28
BH CV XLRA MEAS LEFT MID CCA EDV: 17 CM/SEC
BH CV XLRA MEAS LEFT MID CCA PSV: 78.6 CM/SEC
BH CV XLRA MEAS LEFT MID ICA EDV: 30.5 CM/SEC
BH CV XLRA MEAS LEFT MID ICA PSV: 101 CM/SEC
BH CV XLRA MEAS LEFT PROX CCA EDV: 15.2 CM/SEC
BH CV XLRA MEAS LEFT PROX CCA PSV: 81.5 CM/SEC
BH CV XLRA MEAS LEFT PROX ECA EDV: 10.2 CM/SEC
BH CV XLRA MEAS LEFT PROX ECA PSV: 75 CM/SEC
BH CV XLRA MEAS LEFT PROX ICA EDV: 25.2 CM/SEC
BH CV XLRA MEAS LEFT PROX ICA PSV: 87.4 CM/SEC
BH CV XLRA MEAS LEFT PROX SCLA PSV: 109 CM/SEC
BH CV XLRA MEAS LEFT VERTEBRAL A EDV: 21.6 CM/SEC
BH CV XLRA MEAS LEFT VERTEBRAL A PSV: 75.8 CM/SEC
BH CV XLRA MEAS RIGHT DIST CCA EDV: -22.8 CM/SEC
BH CV XLRA MEAS RIGHT DIST CCA PSV: 69.9 CM/SEC
BH CV XLRA MEAS RIGHT DIST ICA EDV: 34.6 CM/SEC
BH CV XLRA MEAS RIGHT DIST ICA PSV: 119 CM/SEC
BH CV XLRA MEAS RIGHT ICA/CCA RATIO: 1.77
BH CV XLRA MEAS RIGHT MID CCA EDV: 20.8 CM/SEC
BH CV XLRA MEAS RIGHT MID CCA PSV: 69.5 CM/SEC
BH CV XLRA MEAS RIGHT MID ICA EDV: 44 CM/SEC
BH CV XLRA MEAS RIGHT MID ICA PSV: 123 CM/SEC
BH CV XLRA MEAS RIGHT PROX CCA EDV: 19.9 CM/SEC
BH CV XLRA MEAS RIGHT PROX CCA PSV: 78.6 CM/SEC
BH CV XLRA MEAS RIGHT PROX ECA EDV: 22.8 CM/SEC
BH CV XLRA MEAS RIGHT PROX ECA PSV: 82.5 CM/SEC
BH CV XLRA MEAS RIGHT PROX ICA EDV: 21.6 CM/SEC
BH CV XLRA MEAS RIGHT PROX ICA PSV: 60.1 CM/SEC
BH CV XLRA MEAS RIGHT PROX SCLA PSV: 133 CM/SEC
LEFT ARM BP: NORMAL MMHG
RIGHT ARM BP: NORMAL MMHG

## 2023-08-14 RX ORDER — FLUOXETINE HYDROCHLORIDE 40 MG/1
40 CAPSULE ORAL DAILY
Qty: 30 CAPSULE | Refills: 0 | OUTPATIENT
Start: 2023-08-14

## 2023-08-28 ENCOUNTER — OFFICE VISIT (OUTPATIENT)
Dept: NEUROLOGY | Facility: CLINIC | Age: 78
End: 2023-08-28
Payer: MEDICARE

## 2023-08-28 VITALS
HEART RATE: 76 BPM | SYSTOLIC BLOOD PRESSURE: 124 MMHG | DIASTOLIC BLOOD PRESSURE: 78 MMHG | OXYGEN SATURATION: 93 % | TEMPERATURE: 97.3 F

## 2023-08-28 DIAGNOSIS — I10 ESSENTIAL HYPERTENSION: ICD-10-CM

## 2023-08-28 DIAGNOSIS — Z86.73 H/O: CVA (CEREBROVASCULAR ACCIDENT): Primary | ICD-10-CM

## 2023-08-28 DIAGNOSIS — E78.2 MIXED HYPERLIPIDEMIA: ICD-10-CM

## 2023-08-28 NOTE — PROGRESS NOTES
Follow Up Office Visit      Encounter Date: 2023   Patient Name: Alysia Aguayo  : 1945   MRN: [id number]   PCP: Yesica Zhu PA-C    Referring Provider: No ref. provider found     Chief Complaint:    Chief Complaint   Patient presents with    Follow-up     4 month        History of Present Illness:     2022- Alysia Aguayo is a 76 y.o. female with known medical diagnoses of arthritis, anxiety, CAD status post stent (Dr. Bell), GERD, hyperlipidemia, hypertension, kidney disease, spastic colon, as well as a history of tobacco and alcohol abuse.  She presented to [facility] ED on 2022 with concerns of left arm paresthesias, left facial droop, left hand incoordination, and gait instability. In the ED, all of her symptoms resolved other than the left arm paresthesia and incoordination in her hand.      She presents to clinic today for a hospital follow up and to establish care.      Patient states that since being discharged from the hospital she has been doing fairly well. She states that she does continue to have some difficulty with ambulation and notes that she is unable to get to PT as she cannot drive and her granddaughter owns two businesses and has a difficult time getting off work to take her to appointments. She is requesting a home health referral to help improve her strength. She notes that she also has episodes of dizziness several times a day.      2022-patient presents to clinic today in follow-up for her previous right MCA stroke in  of this year.  She is accompanied today by her daughter.  Patient presented back to [facility] on  of this year with waxing and waning left lower extremity weakness and dizziness.  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 Concepcion performed a right innominate artery stent at that time.  Patient will be on DAPT and atorvastatin lifelong.  She will follow-up with him in about 6 months for a  follow-up carotid ultrasound per his orders.  She tells me she has had no new neurologic symptoms, no further dizziness or left lower extremity weakness.  She is recovering from the flu, she was diagnosed on November 30 but feels she is getting better.  She is no longer needing any therapy.  She does not use any devices for ambulation.    Clinic visit 8/28/2023: Seen in clinic today with her daughter.  She denies any new neurologic complaints.  She has been following with Dr. Concepcion and will see him next in November.  She has completed all of her therapies and is able to complete all ADLs without any assistive devices.  She is compliant with her medications without issue.      Subjective        I have reviewed and the following portions of the patient's history were updated as appropriate: past family history, past medical history, past social history, past surgical history and problem list.    Review of Systems   Constitutional: Negative.    HENT:  Negative for trouble swallowing.    Eyes:  Negative for visual disturbance.   Cardiovascular:  Negative for palpitations.   Musculoskeletal:  Negative for gait problem.   Neurological:  Negative for dizziness, speech difficulty, weakness, light-headedness and headaches.   Hematological:  Bruises/bleeds easily.     Medications:     Current Outpatient Medications:     acetaminophen (TYLENOL) 325 MG tablet, Take 2 tablets by mouth Every 4 (Four) Hours As Needed for Mild Pain or Moderate Pain., Disp: , Rfl:     amLODIPine (NORVASC) 5 MG tablet, Take 0.5 tablets by mouth 2 (Two) Times a Day., Disp: , Rfl:     aspirin 81 MG chewable tablet, Chew 1 tablet Daily., Disp: 30 tablet, Rfl: 1    atorvastatin (LIPITOR) 80 MG tablet, Take 1 tablet by mouth Every Night., Disp: 90 tablet, Rfl: 2    clopidogrel (PLAVIX) 75 MG tablet, Take 1 tablet by mouth Daily., Disp: 30 tablet, Rfl: 1    Diclofenac Sodium (VOLTAREN) 1 % gel gel, Apply 4 g topically to the appropriate area as directed 4  (Four) Times a Day As Needed (pain)., Disp: 4 g, Rfl: 5    Esomeprazole Magnesium (NEXIUM PO), Take 75 mg by mouth Before Breakfast., Disp: , Rfl:     FLUoxetine (PROzac) 40 MG capsule, Take 1 capsule by mouth Daily., Disp: 30 capsule, Rfl: 0    diclofenac (VOLTAREN) 50 MG EC tablet, Take 1 tablet by mouth 3 (Three) Times a Day. (Patient not taking: Reported on 8/28/2023), Disp: 60 tablet, Rfl: 1    FLUoxetine (PROzac) 40 MG capsule, Take 1 capsule by mouth Daily. (Patient not taking: Reported on 8/28/2023), Disp: 90 capsule, Rfl: 1    Allergies:   Allergies   Allergen Reactions    Penicillins Hives    Codeine Nausea And Vomiting    Imodium A-D [Loperamide Hcl] Nausea And Vomiting       Objective     Physical Exam:   Vital Signs:   Vitals:    08/28/23 1149   BP: 124/78   BP Location: Left arm   Patient Position: Sitting   Cuff Size: Adult   Pulse: 76   Temp: 97.3 øF (36.3 øC)   TempSrc: Temporal   SpO2: 93%   PainSc: 0-No pain     There is no height or weight on file to calculate BMI.    Physical Exam  Constitutional:       Appearance: Normal appearance. She is not ill-appearing.   HENT:      Head: Atraumatic.      Mouth/Throat:      Pharynx: Oropharynx is clear.   Eyes:      Extraocular Movements: Extraocular movements intact.      Pupils: Pupils are equal, round, and reactive to light.   Pulmonary:      Effort: Pulmonary effort is normal.   Musculoskeletal:         General: Normal range of motion.   Skin:     General: Skin is warm and dry.      Findings: Bruising present.   Neurological:      General: No focal deficit present.      Mental Status: She is alert and oriented to person, place, and time. Mental status is at baseline.      Motor: Motor strength is normal.   Psychiatric:         Mood and Affect: Mood normal.         Speech: Speech normal.         Behavior: Behavior normal.         Thought Content: Thought content normal.         Judgment: Judgment normal.       Neurological Exam  Mental Status  Alert.  Oriented to person, place, time and situation. Oriented to person, place, and time. Speech is normal. Language is fluent with no aphasia. Fund of knowledge is appropriate for level of education.    Cranial Nerves  CN II: Visual fields full to confrontation.  CN III, IV, VI: Extraocular movements intact bilaterally. Pupils equal round and reactive to light bilaterally.  CN V: Facial sensation is normal.  CN VII: Full and symmetric facial movement.  CN VIII: Hearing is intact bilaterally .  CN IX, X: Palate elevates symmetrically  CN XII: Tongue midline without atrophy or fasciculations.    Motor  Normal muscle bulk throughout. No fasciculations present. Normal muscle tone. Strength is 5/5 throughout all four extremities.    Sensory  Light touch is normal in upper and lower extremities.     Coordination  Right: Finger-to-nose normal.Left: Finger-to-nose normal.    Gait  Casual gait is normal including stance, stride, and arm swing.    NIH Stroke Scale    1a  Level of consciousness: 0=alert; keenly responsive   1b. LOC questions:  0=Answers both questions correctly    1c. LOC commands: 0=Performs both tasks correctly   2.  Best Gaze: 0=normal   3. Visual: 0=No visual loss   4. Facial Palsy: 0=Normal symmetric movement   5a. Motor left arm: 0=No drift, limb holds 90 (or 45) degrees for full 10 seconds   5b.  Motor right arm: 0=No drift, limb holds 90 (or 45) degrees for full 10 seconds   6a. Motor left le=No drift, limb holds 90 (or 45) degrees for full 10 seconds   6b  Motor right le=No drift, limb holds 90 (or 45) degrees for full 10 seconds   7. Limb Ataxia: 0=Absent   8.  Sensory: 0=Normal; no sensory loss   9. Best Language:  0=No aphasia, normal   10. Dysarthria: 0=Normal   11. Extinction and Inattention: 0=No abnormality    Total:   0        Modified [place] Score     MODIFIED [place] SCALE (to be assessed for each patient having history of stroke) []Stroke history but not assessed  [x]0: No symptoms at  all  []1: No significant disability despite symptoms  []2: Slight disability  []3: Moderate disability  []4: Moderately severe disability  []5: Severe disability  []6: Death        RESULTS:    CT head without contrast  IMPRESSION:  No acute intracranial abnormality is identified on head CT.     CTA head and neck  IMPRESSION:  1.  Mild narrowing of proximal ICAs by calcified plaque.  2.  Focal narrowing of a distal superior right MCA branch.     CT Perfusion  IMPRESSION:  No focal area of decreased cerebral blood flow (CBF) is seen to suggest  an acute infarct in a large vessel territory     MRI brain without contrast  IMPRESSION:  1. Scattered small acute/subacute infarcts in the right MCA territory  clustered in the posterior insular region. No hemorrhage.  2. Mild underlying chronic small vessel ischemic change.  3. Mild ethmoid sinus mucosal disease.     TTE  Saline test results are negative.  Estimated left ventricular EF = 60% Left ventricular systolic function is normal.     Holter Monitor  13 short runs of SVT, longest 17 beats, rare PACs.  Overall benign study.     Laboratory Results:   Lipid panel 5/31/2022                Triglycerides 123  HDL 53  LDL 74     6/27/2022  AST 27/ALT 16  HgbA1C 5.4  Hgb 12.8/Hct 38.8  Platelets 277    Lipid Panel          11/1/2022    06:34 7/20/2023    13:53   Lipid Panel   Total Cholesterol 133     Total Cholesterol  131    Triglycerides 96  113    HDL Cholesterol 56  56    VLDL Cholesterol 18  20    LDL Cholesterol  59  55    LDL/HDL Ratio 1.03         Assessment / Plan      Assessment/Plan:   1. H/O: R MCA CVA (cerebrovascular accident) (Primary)        -Patient will be on aspirin 81 mg and Plavix 75 mg for life        -Reviewed signs and symptoms of stroke and when to call 911        -Follow up with Dr. Concepcion in November 2. Mixed hyperlipidemia  -     Continue high intensity statin  -     Last LDL 55, patient will need to be on high intensity statin lifelong  due to atherosclerosis  -     Primary care to follow and monitor lipid panel and liver enzymes     3. Essential hypertension        -    Blood pressure today 124/78        -    Goal systolic blood pressure 120-140, management per PCP     Discussed the importance of medication compliance Plavix 75mg daily, Aspirin 81mg daily, and Atorvastatin 80mg nightly and lifestyle modifications adequate control of blood pressure, adequate control of cholesterol (goal LDL <70), adequate control of glucose (<140, A1c goal <7), increased physical activity, and implementation of healthy diet to help reduce the risk of future cerebrovascular events.  Also discussed the signs symptoms that would warrant the patient return back to the emergency department including unilateral weakness, unilateral numbness, visual disturbances, loss of balance, speech difficulties, and/or a sudden severe headache.  Patient and her daughter verbalized understanding.      Follow Up:   Return if symptoms worsen or fail to improve.    HUE [place]  [facility]

## 2023-09-05 ENCOUNTER — TELEPHONE (OUTPATIENT)
Dept: FAMILY MEDICINE CLINIC | Facility: CLINIC | Age: 78
End: 2023-09-05
Payer: MEDICARE

## 2023-09-05 NOTE — TELEPHONE ENCOUNTER
Caller: Alysia Aguayo    Relationship: Self    Best call back number: [phone]     What is the medical concern/diagnosis: JOINT PAIN EVERYWHERE EXCEPT WRISTS AND ANKLES- ARTHRITIC PAIN    What specialty or service is being requested: ORTHOPEDIC OR RHEUMATOLOGY    What is the provider, practice or medical service name: [facility]     What is the office location: NA    What is the office phone number: NA    Any additional details: PATIENT REQUESTING [facility] PROVIDER IN [place]

## 2023-09-06 DIAGNOSIS — M25.50 MULTIPLE JOINT PAIN: Primary | ICD-10-CM

## 2023-09-06 NOTE — TELEPHONE ENCOUNTER
Can let her know I put in referral for rheumatology since multiple joint pains but please let know Radha does not have a rheumatologist; thanks

## 2023-09-13 ENCOUNTER — TELEPHONE (OUTPATIENT)
Dept: PAIN MEDICINE | Facility: CLINIC | Age: 78
End: 2023-09-13
Payer: MEDICARE

## 2023-09-13 NOTE — TELEPHONE ENCOUNTER
Caller: PADMA    Relationship to Patient: SELF  Phone Number: [phone]  Reason for Call: PATIENT CALLING STATING THAT SHE SAW THE ARTHRITIS DR IN [facility] TODAY 09/13/23 AND THEY WANT HER TO COME BACK TO DR THOMAS FOR INJECTIONS IN THE NECK AND HIPS

## 2023-10-02 ENCOUNTER — OFFICE VISIT (OUTPATIENT)
Dept: FAMILY MEDICINE CLINIC | Facility: CLINIC | Age: 78
End: 2023-10-02
Payer: MEDICARE

## 2023-10-02 VITALS
DIASTOLIC BLOOD PRESSURE: 74 MMHG | WEIGHT: 162 LBS | BODY MASS INDEX: 29.81 KG/M2 | HEART RATE: 62 BPM | HEIGHT: 62 IN | SYSTOLIC BLOOD PRESSURE: 140 MMHG | OXYGEN SATURATION: 98 %

## 2023-10-02 DIAGNOSIS — R30.0 DYSURIA: ICD-10-CM

## 2023-10-02 DIAGNOSIS — B37.31 VAGINAL YEAST INFECTION: Primary | ICD-10-CM

## 2023-10-02 PROBLEM — M19.90 OSTEOARTHRITIS: Status: ACTIVE | Noted: 2023-10-02

## 2023-10-02 LAB
BILIRUB BLD-MCNC: NEGATIVE MG/DL
CLARITY, POC: CLEAR
COLOR UR: YELLOW
GLUCOSE UR STRIP-MCNC: NEGATIVE MG/DL
KETONES UR QL: NEGATIVE
LEUKOCYTE EST, POC: ABNORMAL
NITRITE UR-MCNC: NEGATIVE MG/ML
PH UR: 6.5 [PH] (ref 5–8)
PROT UR STRIP-MCNC: NEGATIVE MG/DL
RBC # UR STRIP: ABNORMAL /UL
SP GR UR: 1.02 (ref 1–1.03)
UROBILINOGEN UR QL: NORMAL

## 2023-10-02 PROCEDURE — 99213 OFFICE O/P EST LOW 20 MIN: CPT | Performed by: PHYSICIAN ASSISTANT

## 2023-10-02 PROCEDURE — 3078F DIAST BP <80 MM HG: CPT | Performed by: PHYSICIAN ASSISTANT

## 2023-10-02 PROCEDURE — 3077F SYST BP >= 140 MM HG: CPT | Performed by: PHYSICIAN ASSISTANT

## 2023-10-02 PROCEDURE — 81002 URINALYSIS NONAUTO W/O SCOPE: CPT | Performed by: PHYSICIAN ASSISTANT

## 2023-10-02 RX ORDER — CHOLECALCIFEROL (VITAMIN D3) 10(400)/ML
DROPS ORAL
COMMUNITY
Start: 2023-09-22 | End: 2023-11-20

## 2023-10-02 RX ORDER — GLYCOPYRROLATE 1 MG/1
TABLET ORAL
COMMUNITY

## 2023-10-02 RX ORDER — MICONAZOLE NITRATE 2 %
1 CREAM WITH APPLICATOR VAGINAL DAILY
Qty: 1 KIT | Refills: 0 | Status: SHIPPED | OUTPATIENT
Start: 2023-10-02 | End: 2023-10-09

## 2023-10-02 NOTE — PROGRESS NOTES
".Chief Complaint  burning with urination (Burning with urination and itching going on for 6-8 months off and on)    Subjective          History of Present Illness  Alysia Aguayo is here today with her daughter    Patient states for the past 6 months she has had vaginal itching on and off.  She states some days it gets a bit better but never seems to go away completely.  She states that she tried some Azo on and off as she thought this was for yeast infection.  The last 2 to 3 days she has been much more itchy and that the itching is keeping her awake at night.  She denies any intercourse.  She states has not had any antibiotics recently.  She states that it does occasionally hurt to pee.  She states that mainly it is painful when urine flows over her labia.  Denies any vaginal discharge.  She has not had any stomach or back pain and denies any fever. She has not used any mediation besides AZO.      Objective   Vital Signs:   /74   Pulse 62   Ht 157.5 cm (62\")   Wt 73.5 kg (162 lb)   SpO2 98%   BMI 29.63 kg/m²     Body mass index is 29.63 kg/m².      Review of Systems      Current Outpatient Medications:     acetaminophen (TYLENOL) 325 MG tablet, Take 2 tablets by mouth Every 4 (Four) Hours As Needed for Mild Pain or Moderate Pain., Disp: , Rfl:     amLODIPine (NORVASC) 5 MG tablet, Take 0.5 tablets by mouth 2 (Two) Times a Day., Disp: , Rfl:     aspirin 81 MG chewable tablet, Chew 1 tablet Daily., Disp: 30 tablet, Rfl: 1    atorvastatin (LIPITOR) 80 MG tablet, Take 1 tablet by mouth Every Night., Disp: 90 tablet, Rfl: 2    Cholecalciferol (Vitamin D) 10 MCG/ML liquid, take 1 capsule by mouth once weekly for 8 weeks, Disp: , Rfl:     clopidogrel (PLAVIX) 75 MG tablet, Take 1 tablet by mouth Daily., Disp: 30 tablet, Rfl: 1    Diclofenac Sodium (VOLTAREN) 1 % gel gel, Apply 4 g topically to the appropriate area as directed 4 (Four) Times a Day As Needed (pain)., Disp: 4 g, Rfl: 5    Esomeprazole " Magnesium (NEXIUM PO), Take 75 mg by mouth Before Breakfast., Disp: , Rfl:     FLUoxetine (PROzac) 40 MG capsule, Take 1 capsule by mouth Daily., Disp: 90 capsule, Rfl: 1    glycopyrrolate (ROBINUL) 1 MG tablet, One tablet by mouth BID, Disp: , Rfl:     diclofenac (VOLTAREN) 50 MG EC tablet, Take 1 tablet by mouth 3 (Three) Times a Day., Disp: 60 tablet, Rfl: 1    FLUoxetine (PROzac) 40 MG capsule, Take 1 capsule by mouth Daily., Disp: 30 capsule, Rfl: 0    Miconazole Nitrate Applicator (Monistat 7 Combo Pack Yelena) 100 & 2 MG-% (9GM) kit, Insert 1 Units into the vagina Daily for 7 days., Disp: 1 kit, Rfl: 0    Allergies: Penicillins, Codeine, and Imodium a-d [loperamide hcl]    Physical Exam  Vitals and nursing note reviewed.   Constitutional:       Appearance: Normal appearance. She is normal weight.   HENT:      Head: Normocephalic and atraumatic.   Cardiovascular:      Rate and Rhythm: Normal rate and regular rhythm.      Heart sounds: Normal heart sounds.   Pulmonary:      Effort: Pulmonary effort is normal.   Neurological:      General: No focal deficit present.      Mental Status: She is alert. Mental status is at baseline.   Psychiatric:         Mood and Affect: Mood normal.        Result Review :     Urine Culture - Urine, Urine, Clean Catch (10/02/2023 15:18)  POC Urinalysis Dipstick (10/02/2023 15:17)              Assessment and Plan    Diagnoses and all orders for this visit:    1. Vaginal yeast infection (Primary)  -     Miconazole Nitrate Applicator (Monistat 7 Combo Pack Yelena) 100 & 2 MG-% (9GM) kit; Insert 1 Units into the vagina Daily for 7 days.  Dispense: 1 kit; Refill: 0  Will add above for treatment . Discussed with patient that UA demonstrated a trace of blood and WBC which can be triggered by irritation from yeast infection and vaginal irritation itseld. Will send for culture to double check to be sure   2. Dysuria  -     POC Urinalysis Dipstick  -     Urine Culture - Urine, Urine, Clean  Catch        Follow Up   No follow-ups on file.  Patient was given instructions and counseling regarding her condition or for health maintenance advice. Please see specific information pulled into the AVS if appropriate.     ALEX Chacko  10/02/2023

## 2023-10-04 LAB
BACTERIA UR CULT: NORMAL
BACTERIA UR CULT: NORMAL

## 2023-10-04 NOTE — PROGRESS NOTES
Please call the patient and let her know that the OTC should work as well If by Friday she has not improved to call the office thanks

## 2023-10-04 NOTE — PROGRESS NOTES
Please call patient or daughter and let them know that her urine culture is negative for any infection - how are the medicines working for her?

## 2023-10-12 ENCOUNTER — TELEPHONE (OUTPATIENT)
Dept: FAMILY MEDICINE CLINIC | Facility: CLINIC | Age: 78
End: 2023-10-12

## 2023-10-12 NOTE — TELEPHONE ENCOUNTER
Caller: Alysia Aguayo     Relationship: PATIENT    Best call back number:  [phone]     What is your medical concern? YEAST INFECTION DIDN'T CLEAR UP WITH MEDICATIONS THAT YOU GAVE HER AND SHE FINISHED.    How long has this issue been going on? 6-8 MONTHS    Is your provider already aware of this issue? YES    Have you been treated for this issue? YES, BUT MEDICATIONS DIDN'T WORK. PLEASE ADVISE PATIENT HOW WE CAN HELP NEXT. THANK YOU.

## 2023-10-17 ENCOUNTER — OFFICE VISIT (OUTPATIENT)
Dept: FAMILY MEDICINE CLINIC | Facility: CLINIC | Age: 78
End: 2023-10-17
Payer: MEDICARE

## 2023-10-17 VITALS — HEIGHT: 62 IN | OXYGEN SATURATION: 98 % | BODY MASS INDEX: 29.44 KG/M2 | HEART RATE: 72 BPM | WEIGHT: 160 LBS

## 2023-10-17 DIAGNOSIS — R39.14 FEELING OF INCOMPLETE BLADDER EMPTYING: Primary | ICD-10-CM

## 2023-10-17 DIAGNOSIS — N89.8 VAGINAL IRRITATION: ICD-10-CM

## 2023-10-17 LAB
BILIRUB BLD-MCNC: NEGATIVE MG/DL
CLARITY, POC: CLEAR
COLOR UR: YELLOW
GLUCOSE UR STRIP-MCNC: NEGATIVE MG/DL
KETONES UR QL: NEGATIVE
LEUKOCYTE EST, POC: NEGATIVE
NITRITE UR-MCNC: NEGATIVE MG/ML
PH UR: 6 [PH] (ref 5–8)
PROT UR STRIP-MCNC: NEGATIVE MG/DL
RBC # UR STRIP: NEGATIVE /UL
SP GR UR: 1.01 (ref 1–1.03)
UROBILINOGEN UR QL: NORMAL

## 2023-10-17 PROCEDURE — 99213 OFFICE O/P EST LOW 20 MIN: CPT | Performed by: PHYSICIAN ASSISTANT

## 2023-10-17 PROCEDURE — 81002 URINALYSIS NONAUTO W/O SCOPE: CPT | Performed by: PHYSICIAN ASSISTANT

## 2023-10-19 LAB
BACTERIA UR CULT: NO GROWTH
BACTERIA UR CULT: NORMAL

## 2023-10-19 NOTE — PROGRESS NOTES
"Chief Complaint  Vaginitis (Pt states was seen couple weeks ago and said she did not have a UTI and had suspected yeast infection; she states has had vaginal itching for past 6+ months)    Subjective          Alysia Aguayo presents to [facility]  History of Present Illness  Patient in today for feelings of not being able to completely empty bladder and having to push to try to get all the urine out at times. . States has been ongoing for quite some time. She also has continued vaginal itching- states has been ongoing x 6 months or longer Denies vaginal discharge. Denies blood in urine. Denies dysuria . Denies fever. Denies any new products that may be affecting area. Tried some otc monistat cream with minimal benefit.   Vaginitis  This is a chronic problem. The problem occurs intermittently. The problem has been unchanged. Pertinent negatives include no abdominal pain, change in bowel habit, chest pain, fever, nausea or vomiting.       Objective   Vital Signs:   Pulse 72   Ht 157.5 cm (62\")   Wt 72.6 kg (160 lb)   SpO2 98%   BMI 29.26 kg/m²     Body mass index is 29.26 kg/m².    Review of Systems   Constitutional:  Negative for fever.   Cardiovascular:  Negative for chest pain.   Gastrointestinal:  Negative for abdominal pain, change in bowel habit, diarrhea, nausea and vomiting.   Genitourinary:  Positive for difficulty urinating. Negative for decreased urine volume, dysuria, frequency, hematuria, pelvic pain, pelvic pressure, urinary incontinence, vaginal discharge and vaginal pain.       Past History:  Medical History: has a past medical history of Arthritis, Back pain, Carotid artery disease, Coronary artery disease, Depression, GERD (gastroesophageal reflux disease), History of alcoholism, Hyperlipidemia, Hypertension, Kidney disease, Neck pain, Spastic colon, Spinal stenosis, Stroke (2022), Wears dentures, and Wears reading eyeglasses.   Surgical History: has a past " surgical history that includes Hysterectomy; Cholecystectomy; Tubal ligation; Lumbar discectomy (07/10/2014); Cataract extraction (Bilateral); Colonoscopy (2016); Esophageal dilation; Coronary stent placement (2010); Cardiac catheterization (2010); lumbar laminectomy discectomy decompression (N/A, 10/17/2019); Upper gastrointestinal endoscopy; and Interventional radiology procedure (N/A, 11/3/2022).   Family History: family history includes Cancer in her father; Coronary artery disease in her mother; Emphysema in her mother; Heart disease in her mother; Lung cancer in her father.   Social History: reports that she quit smoking about 23 years ago. Her smoking use included cigarettes. She has a 60.00 pack-year smoking history. She has been exposed to tobacco smoke. She has never used smokeless tobacco. She reports that she does not drink alcohol and does not use drugs.      Current Outpatient Medications:     acetaminophen (TYLENOL) 325 MG tablet, Take 2 tablets by mouth Every 4 (Four) Hours As Needed for Mild Pain or Moderate Pain., Disp: , Rfl:     amLODIPine (NORVASC) 5 MG tablet, Take 0.5 tablets by mouth 2 (Two) Times a Day., Disp: , Rfl:     aspirin 81 MG chewable tablet, Chew 1 tablet Daily., Disp: 30 tablet, Rfl: 1    atorvastatin (LIPITOR) 80 MG tablet, Take 1 tablet by mouth Every Night., Disp: 90 tablet, Rfl: 2    Cholecalciferol (Vitamin D) 10 MCG/ML liquid, take 1 capsule by mouth once weekly for 8 weeks, Disp: , Rfl:     clopidogrel (PLAVIX) 75 MG tablet, Take 1 tablet by mouth Daily., Disp: 30 tablet, Rfl: 1    diclofenac (VOLTAREN) 50 MG EC tablet, Take 1 tablet by mouth 3 (Three) Times a Day., Disp: 60 tablet, Rfl: 1    Diclofenac Sodium (VOLTAREN) 1 % gel gel, Apply 4 g topically to the appropriate area as directed 4 (Four) Times a Day As Needed (pain)., Disp: 4 g, Rfl: 5    Esomeprazole Magnesium (NEXIUM PO), Take 75 mg by mouth Before Breakfast., Disp: , Rfl:     FLUoxetine (PROzac) 40 MG capsule,  Take 1 capsule by mouth Daily., Disp: 90 capsule, Rfl: 1    glycopyrrolate (ROBINUL) 1 MG tablet, One tablet by mouth BID, Disp: , Rfl:   Allergies: Penicillins, Codeine, and Imodium a-d [loperamide hcl]    Physical Exam  Constitutional:       Appearance: Normal appearance.   HENT:      Mouth/Throat:      Pharynx: Oropharynx is clear.   Eyes:      Conjunctiva/sclera: Conjunctivae normal.      Pupils: Pupils are equal, round, and reactive to light.   Cardiovascular:      Rate and Rhythm: Normal rate and regular rhythm.      Heart sounds: Normal heart sounds.   Pulmonary:      Effort: Pulmonary effort is normal.      Breath sounds: Normal breath sounds.   Abdominal:      Palpations: Abdomen is soft.      Tenderness: There is no abdominal tenderness.   Neurological:      Mental Status: She is oriented to person, place, and time.   Psychiatric:         Mood and Affect: Mood normal.         Behavior: Behavior normal.             Assessment and Plan   Diagnoses and all orders for this visit:    1. Feeling of incomplete bladder emptying (Primary)  -     Ambulatory Referral to Urology  -     Urine Culture - Urine, Urine, Clean Catch  -     POC Urinalysis Dipstick  Will send urine culture. Recommend good hydration and will put in referral for urology with persistent symptoms.   2. Vaginal irritation  -     Ambulatory Referral to Gynecology  She has tried yeast medication with minimal benefit. With persistent symptoms, recommend to get in with gynecology-- will put in referral. RTC prior as needed.           Follow Up   No follow-ups on file.  Patient was given instructions and counseling regarding her condition or for health maintenance advice. Please see specific information pulled into the AVS if appropriate.     Yesica Zhu PA-C

## 2023-10-24 ENCOUNTER — TELEPHONE (OUTPATIENT)
Dept: OBSTETRICS AND GYNECOLOGY | Facility: CLINIC | Age: 78
End: 2023-10-24
Payer: MEDICARE

## 2023-10-25 ENCOUNTER — OFFICE VISIT (OUTPATIENT)
Dept: OBSTETRICS AND GYNECOLOGY | Facility: CLINIC | Age: 78
End: 2023-10-25
Payer: MEDICARE

## 2023-10-25 VITALS
DIASTOLIC BLOOD PRESSURE: 78 MMHG | BODY MASS INDEX: 29.08 KG/M2 | WEIGHT: 158 LBS | HEIGHT: 62 IN | SYSTOLIC BLOOD PRESSURE: 118 MMHG

## 2023-10-25 DIAGNOSIS — N89.8 VAGINAL DISCHARGE: Primary | ICD-10-CM

## 2023-10-25 DIAGNOSIS — Z90.710 HISTORY OF HYSTERECTOMY: ICD-10-CM

## 2023-10-25 DIAGNOSIS — M85.80 OSTEOPENIA, SENILE: ICD-10-CM

## 2023-10-25 DIAGNOSIS — N90.4 LICHEN SCLEROSUS OF FEMALE GENITALIA: ICD-10-CM

## 2023-10-25 DIAGNOSIS — Z12.31 ENCOUNTER FOR SCREENING MAMMOGRAM FOR MALIGNANT NEOPLASM OF BREAST: ICD-10-CM

## 2023-10-25 DIAGNOSIS — Z12.11 SCREENING FOR COLON CANCER: ICD-10-CM

## 2023-10-25 DIAGNOSIS — N89.8 VAGINAL ITCHING: ICD-10-CM

## 2023-10-25 LAB
BILIRUB BLD-MCNC: NEGATIVE MG/DL
CLARITY, POC: CLEAR
COLOR UR: YELLOW
GLUCOSE UR STRIP-MCNC: NEGATIVE MG/DL
KETONES UR QL: NEGATIVE
LEUKOCYTE EST, POC: NEGATIVE
NITRITE UR-MCNC: NEGATIVE MG/ML
PH UR: 6 [PH] (ref 5–8)
PROT UR STRIP-MCNC: NEGATIVE MG/DL
RBC # UR STRIP: NEGATIVE /UL
SP GR UR: 1.01 (ref 1–1.03)
UROBILINOGEN UR QL: NORMAL
WET PREP GENITAL: NORMAL

## 2023-10-25 RX ORDER — CLOBETASOL PROPIONATE 0.5 MG/G
CREAM TOPICAL
Qty: 30 G | Refills: 2 | Status: SHIPPED | OUTPATIENT
Start: 2023-10-25

## 2023-10-25 NOTE — PROGRESS NOTES
"     Gynecologic Annual Exam Note        GYN Annual Exam     CC - Here for annual exam.     Subjective     HPI  Alysia Aguayo is a 78 y.o. female, , who presents for annual well woman exam as a(n) new patient and referral from Yesica Zhu PA-C.  She is postmenopausal with total abdominal hysterectomy in '.  Patient denies vaginal bleeding.   Patient reports problems with: Patient in today for feelings of having to strain to empty bladder at times and has been going on for 2 months. She also has continued vaginal itching- states has been ongoing x 6 months.  Denies vaginal discharge, history of blood in urine about 1 month ago. C/o dysuria, she thinks is only related to the skin irritation where she scratches often.  Denies fever. Denies any new products that may be affecting area. Tried some otc monistat cream with minimal benefits.   There were no changes to her medical or surgical history since her last visit.. Partner Status: Marital Status: .  She is not currently sexually active. STD testing recommendations have been explained to the patient and she does not desire STD testing.    Additional OB/GYN History     On HRT? No    Last Pap : \"about 15 years ago\". Results: negative. HPV: unknown .     Last Completed Pap Smear       This patient has no relevant Health Maintenance data.          History of abnormal Pap smear: no  Family history of uterine, colon, breast, or ovarian cancer: no  Performs monthly Self-Breast Exam: yes  Last mammogram: 1/10/18. Done at [facility] [place] Pt reports Neg.    Last Completed Mammogram       This patient has no relevant Health Maintenance data.          Last colonoscopy: has had a colonoscopy 4 year(s) ago. Per patient, Neg    Last Completed Colonoscopy            Ordered - COLORECTAL CANCER SCREENING (COLONOSCOPY - Every 10 Years) Ordered on 10/25/2023      2019  COLONOSCOPY (Done - Dr. Cano, awaiting records)              "     Last DEXA: On 1/10/18 and results were Osteopenia [facility]  Exercises Regularly: no  Feelings of Anxiety or Depression: no, medication prozac      Tobacco Usage?: No       Current Outpatient Medications:     acetaminophen (TYLENOL) 325 MG tablet, Take 2 tablets by mouth Every 4 (Four) Hours As Needed for Mild Pain or Moderate Pain., Disp: , Rfl:     amLODIPine (NORVASC) 5 MG tablet, Take 0.5 tablets by mouth 2 (Two) Times a Day., Disp: , Rfl:     aspirin 81 MG chewable tablet, Chew 1 tablet Daily., Disp: 30 tablet, Rfl: 1    atorvastatin (LIPITOR) 80 MG tablet, Take 1 tablet by mouth Every Night., Disp: 90 tablet, Rfl: 2    clopidogrel (PLAVIX) 75 MG tablet, Take 1 tablet by mouth Daily., Disp: 30 tablet, Rfl: 1    Diclofenac Sodium (VOLTAREN) 1 % gel gel, Apply 4 g topically to the appropriate area as directed 4 (Four) Times a Day As Needed (pain)., Disp: 4 g, Rfl: 5    Esomeprazole Magnesium (NEXIUM PO), Take 75 mg by mouth Before Breakfast., Disp: , Rfl:     FLUoxetine (PROzac) 40 MG capsule, Take 1 capsule by mouth Daily., Disp: 90 capsule, Rfl: 1    glycopyrrolate (ROBINUL) 1 MG tablet, One tablet by mouth BID, Disp: , Rfl:     clobetasol prop emollient base (TEMOVATE) 0.05 % emollient cream, Apply a small amount to affected area twice daily for 6 to 8 weeks or until symptoms completely resolved.  Then apply 3 times weekly for maintenance., Disp: 30 g, Rfl: 2    Patient denies the need for medication refills today.    OB History          5    Para        Term                AB        Living   5         SAB        IAB        Ectopic        Molar        Multiple        Live Births                    Past Medical History:   Diagnosis Date    Arthritis     Back pain     Carotid artery disease     Coronary artery disease     Depression     GERD (gastroesophageal reflux disease)     History of alcoholism     Hyperlipidemia     Hypertension     Kidney disease     was stage 4 but  taking excessive amounts of ibuprofen, f/u with nephrologist at the time, resolved since and was to see nephrologist as needed     Neck pain     Spastic colon     Spinal stenosis     Stroke 2022    Vaginal itching     Wears dentures     Wears reading eyeglasses         Past Surgical History:   Procedure Laterality Date    CARDIAC CATHETERIZATION  2010    x2     CATARACT EXTRACTION Bilateral     CHOLECYSTECTOMY      COLONOSCOPY  2016    CORONARY STENT PLACEMENT  2010    x3     ESOPHAGEAL DILATATION      HYSTERECTOMY      INTERVENTIONAL RADIOLOGY PROCEDURE N/A 11/3/2022    Procedure: IR angiogram vertebral cervical intracranial;  Surgeon: Cralos Concepcion MD;  Location:  [facility];  Service: Interventional Radiology;  Laterality: N/A;  w/ possible stenting    LUMBAR DISCECTOMY  07/10/2014    Rt- L1/2 Dr. Dionicio Pollard     LUMBAR LAMINECTOMY DISCECTOMY DECOMPRESSION N/A 10/17/2019    Procedure: LUMBAR LAMINECTOMY L3-5;  Surgeon: Dionicio Pollard MD;  Location:  [facility];  Service: Neurosurgery    TUBAL ABDOMINAL LIGATION      UPPER GASTROINTESTINAL ENDOSCOPY         Health Maintenance   Topic Date Due    ZOSTER VACCINE (1 of 2) Never done    ANNUAL WELLNESS VISIT  Never done    DXA SCAN  01/10/2020    INFLUENZA VACCINE  08/01/2023    COVID-19 Vaccine (3 - 2023-24 season) 09/01/2023    BMI FOLLOWUP  09/28/2023    Pneumococcal Vaccine 65+ (1 - PCV) 07/20/2024 (Originally 9/6/2011)    TDAP/TD VACCINES (1 - Tdap) 07/20/2024 (Originally 9/6/1964)    MAMMOGRAM  07/20/2025 (Originally 7/20/2023)    LIPID PANEL  07/20/2024    COLORECTAL CANCER SCREENING  08/05/2029    HEPATITIS C SCREENING  Completed       The additional following portions of the patient's history were reviewed and updated as appropriate: allergies, current medications, past family history, past medical history, past social history, past surgical history, and problem list.    Review of Systems   Respiratory: Negative.    "  Cardiovascular:  Negative for chest pain.   Gastrointestinal:  Negative for abdominal distention and abdominal pain.   Genitourinary:  Positive for dysuria, urinary incontinence (difficulty emptying bladder) and vaginal pain (vaginal itching). Negative for genital sores, hematuria, menstrual problem, pelvic pain and vaginal discharge.   All other systems reviewed and are negative.        I have reviewed and agree with the HPI, ROS, and historical information as entered above.   Quang Miller MD           Objective   /78   Ht 157.5 cm (62\")   Wt 71.7 kg (158 lb)   BMI 28.90 kg/m²     Physical Exam  Vitals and nursing note reviewed. Exam conducted with a chaperone present.   Constitutional:       Appearance: Normal appearance. She is well-developed.   HENT:      Head: Normocephalic and atraumatic.   Neck:      Thyroid: No thyroid mass or thyromegaly.   Cardiovascular:      Rate and Rhythm: Normal rate and regular rhythm.      Heart sounds: Normal heart sounds. No murmur heard.  Pulmonary:      Effort: Pulmonary effort is normal. No retractions.      Breath sounds: Normal breath sounds. No wheezing, rhonchi or rales.   Chest:      Chest wall: No mass or tenderness.   Breasts:     Right: Normal. No mass, nipple discharge, skin change or tenderness.      Left: Normal. No mass, nipple discharge, skin change or tenderness.   Abdominal:      General: Bowel sounds are normal.      Palpations: Abdomen is soft. Abdomen is not rigid. There is no mass.      Tenderness: There is no abdominal tenderness. There is no guarding.      Hernia: No hernia is present. There is no hernia in the left inguinal area.   Genitourinary:     Exam position: Lithotomy position.      Labia:         Right: No rash, tenderness or lesion.         Left: No rash, tenderness or lesion.       Vagina: Normal. No vaginal discharge or lesions.      Uterus: Normal, absent. Not fixed.       Adnexa:         Right: No mass or tenderness.          " Left: No mass or tenderness.        Rectum: No external hemorrhoid.      Comments: Symmetrical bilateral white skin  changes consistent with lichen sclerosis  Atrophy and flattening of vaginal rugate consistent with postmenopausal.  No vaginal discharge.  General cuff well supported and nontender.  Musculoskeletal:      Cervical back: Normal range of motion. No muscular tenderness.   Neurological:      Mental Status: She is alert and oriented to person, place, and time.   Psychiatric:         Behavior: Behavior normal.            Assessment and Plan    Problem List Items Addressed This Visit       History of hysterectomy    Overview     MIKE and BSO in [place] for benign ovarian cyst in 1991         Encounter for screening mammogram for malignant neoplasm of breast    Overview     Last mammogram 2018 in [place].  Mammogram ordered.         Relevant Orders    Mammo Screening Digital Tomosynthesis Bilateral With CAD    Screening for colon cancer    Overview     Last colonoscopy in 2019 in [place].  Dr. Quinones.  Reportedly negative.  Repeat in 5 years?         Relevant Orders    Ambulatory Referral For Screening Colonoscopy    Vaginal itching    Overview     Appears to be due to lichen sclerosus.  Vaginal new swab sent for yeast panel.         Relevant Orders    POC Urinalysis Dipstick (Completed)    POC Wet Prep    Lichen sclerosus of female genitalia    Overview     10/25/2023; complains of worsening vulvar itching over the last 6 months.  Exam consistent with bilateral and symmetrical lichen sclerosus.  Rx to clobetasol cream sent to pharmacy.         Osteopenia, senile    Overview     Last DEXA scan in 2018 at [facility].          Other Visit Diagnoses       Vaginal discharge    -  Primary            GYN annual well woman exam.  78 years of age.  History of AH BSO in 1991 for ovarian cyst.  No further Pap smear screening indicated.  Vaginal itching.  Appears to be due to lichen sclerosis.   New swab for yeast sent.  Lichen sclerosus; bilateral symmetrical white changes of the labia.  Rx for clobetasol cream sent to pharmacy.  Call if symptoms do not improve or worsen.  Overdue for mammogram.  Wishes to schedule at [facility].  May be due for colonoscopy.  She should check with Dr. Quinones's office in [place] to determine when next colonoscopy due.  Osteopenia; DEXA 2018 with mild osteopenia.  Reviewed monthly self breast exams.  Instructed to call with lumps, pain, or breast discharge.  Yearly mammograms ordered.  Ordered mammogram today.  Recommended use of Vitamin D and getting adequate calcium in her diet. (1500mg)  Reviewed exercise as a preventative health measures.   Recommended Flu Vaccine in Fall of each year.  RTC in 1 year or PRN with problems.  Return in about 1 year (around 10/25/2024), or if symptoms worsen or fail to improve, for Annual physical.    Quang Miller MD  10/25/2023

## 2023-10-27 LAB
C ALBICANS DNA VAG QL NAA+PROBE: NEGATIVE
C GLABRATA DNA VAG QL NAA+PROBE: NEGATIVE
C KRUSEI DNA VAG QL NAA+PROBE: NEGATIVE
C LUSITANIAE DNA VAG QL NAA+PROBE: NEGATIVE
CANDIDA DNA VAG QL NAA+PROBE: NEGATIVE

## 2023-11-16 DIAGNOSIS — Z86.73 H/O: CVA (CEREBROVASCULAR ACCIDENT): ICD-10-CM

## 2023-11-16 DIAGNOSIS — I63.9 CEREBROVASCULAR ACCIDENT (CVA), UNSPECIFIED MECHANISM: Primary | ICD-10-CM

## 2023-11-16 DIAGNOSIS — I63.511 ACUTE ISCHEMIC RIGHT MCA STROKE: ICD-10-CM

## 2023-11-16 DIAGNOSIS — I65.01 STENOSIS OF RIGHT VERTEBRAL ARTERY: ICD-10-CM

## 2023-12-04 ENCOUNTER — OFFICE VISIT (OUTPATIENT)
Dept: NEUROSURGERY | Facility: CLINIC | Age: 78
End: 2023-12-04
Payer: MEDICARE

## 2023-12-04 ENCOUNTER — HOSPITAL ENCOUNTER (OUTPATIENT)
Dept: CARDIOLOGY | Facility: HOSPITAL | Age: 78
Discharge: HOME | End: 2023-12-04
Admitting: NEUROLOGICAL SURGERY
Payer: MEDICARE

## 2023-12-04 VITALS
HEIGHT: 63 IN | SYSTOLIC BLOOD PRESSURE: 140 MMHG | DIASTOLIC BLOOD PRESSURE: 68 MMHG | BODY MASS INDEX: 28.1 KG/M2 | WEIGHT: 158.6 LBS

## 2023-12-04 DIAGNOSIS — G45.8 SUBCLAVIAN STEAL SYNDROME: Primary | ICD-10-CM

## 2023-12-04 LAB
BH CV XLRA MEAS LEFT DIST CCA EDV: -17 CM/SEC
BH CV XLRA MEAS LEFT DIST CCA PSV: -71.7 CM/SEC
BH CV XLRA MEAS LEFT DIST ICA EDV: -30.9 CM/SEC
BH CV XLRA MEAS LEFT DIST ICA PSV: -144.5 CM/SEC
BH CV XLRA MEAS LEFT ICA/CCA RATIO: 2.42
BH CV XLRA MEAS LEFT MID CCA EDV: 17.6 CM/SEC
BH CV XLRA MEAS LEFT MID CCA PSV: 81.7 CM/SEC
BH CV XLRA MEAS LEFT MID ICA EDV: 38.3 CM/SEC
BH CV XLRA MEAS LEFT MID ICA PSV: 173.6 CM/SEC
BH CV XLRA MEAS LEFT PROX CCA EDV: 13.8 CM/SEC
BH CV XLRA MEAS LEFT PROX CCA PSV: 76.7 CM/SEC
BH CV XLRA MEAS LEFT PROX ECA EDV: -19.2 CM/SEC
BH CV XLRA MEAS LEFT PROX ECA PSV: -66.4 CM/SEC
BH CV XLRA MEAS LEFT PROX ICA EDV: -23.9 CM/SEC
BH CV XLRA MEAS LEFT PROX ICA PSV: -97.4 CM/SEC
BH CV XLRA MEAS LEFT PROX SCLA PSV: 126 CM/SEC
BH CV XLRA MEAS LEFT VERTEBRAL A EDV: 19.5 CM/SEC
BH CV XLRA MEAS LEFT VERTEBRAL A PSV: 116.3 CM/SEC
BH CV XLRA MEAS RIGHT DIST CCA EDV: -25.1 CM/SEC
BH CV XLRA MEAS RIGHT DIST CCA PSV: -63.5 CM/SEC
BH CV XLRA MEAS RIGHT DIST ICA EDV: -37.8 CM/SEC
BH CV XLRA MEAS RIGHT DIST ICA PSV: -76.6 CM/SEC
BH CV XLRA MEAS RIGHT ICA/CCA RATIO: 1.21
BH CV XLRA MEAS RIGHT MID CCA EDV: 18.9 CM/SEC
BH CV XLRA MEAS RIGHT MID CCA PSV: 67.9 CM/SEC
BH CV XLRA MEAS RIGHT MID ICA EDV: 25 CM/SEC
BH CV XLRA MEAS RIGHT MID ICA PSV: 61.4 CM/SEC
BH CV XLRA MEAS RIGHT PROX CCA EDV: 16.1 CM/SEC
BH CV XLRA MEAS RIGHT PROX CCA PSV: 71.2 CM/SEC
BH CV XLRA MEAS RIGHT PROX ECA EDV: -12.7 CM/SEC
BH CV XLRA MEAS RIGHT PROX ECA PSV: -52.4 CM/SEC
BH CV XLRA MEAS RIGHT PROX ICA EDV: 18.2 CM/SEC
BH CV XLRA MEAS RIGHT PROX ICA PSV: 51.1 CM/SEC
BH CV XLRA MEAS RIGHT PROX SCLA PSV: 156 CM/SEC
BH CV XLRA MEAS RIGHT VERTEBRAL A PSV: 62.4 CM/SEC
LEFT ARM BP: NORMAL MMHG
RIGHT ARM BP: NORMAL MMHG

## 2023-12-04 PROCEDURE — 93880 EXTRACRANIAL BILAT STUDY: CPT

## 2023-12-04 PROCEDURE — 99214 OFFICE O/P EST MOD 30 MIN: CPT | Performed by: NEUROLOGICAL SURGERY

## 2023-12-04 PROCEDURE — 93880 EXTRACRANIAL BILAT STUDY: CPT | Performed by: INTERNAL MEDICINE

## 2023-12-04 PROCEDURE — 3078F DIAST BP <80 MM HG: CPT | Performed by: NEUROLOGICAL SURGERY

## 2023-12-04 PROCEDURE — 3077F SYST BP >= 140 MM HG: CPT | Performed by: NEUROLOGICAL SURGERY

## 2023-12-04 RX ORDER — MECLIZINE HCL 12.5 MG/1
12.5 TABLET ORAL 3 TIMES DAILY PRN
Qty: 90 TABLET | Refills: 2 | Status: SHIPPED | OUTPATIENT
Start: 2023-12-04

## 2023-12-04 NOTE — PROGRESS NOTES
"Subjective     Chief Complaint: Carotid stenosis, status post right innominate artery stent    Patient ID: Alysia Aguayo is a 78 y.o. female is here today for follow-up.    History of Present Illness    This is a 78-year-old woman in whom I performed an innominate artery stenting about a year ago.  She was supposed to follow-up with me after 6 months and it looks like she did have a carotid ultrasound in May, however it does not appear as though she was ever seen in follow-up.  She presents today to discuss the results of her most recent surveillance carotid ultrasound.    The following portions of the patient's history were reviewed and updated as appropriate: allergies, current medications, past family history, past medical history, past social history, past surgical history and problem list.    Family history:   Family History   Problem Relation Age of Onset   • Heart disease Mother    • Emphysema Mother    • Coronary artery disease Mother    • Cancer Father    • Lung cancer Father    • Colon cancer Neg Hx        Social history:   Social History     Socioeconomic History   • Marital status:    Tobacco Use   • Smoking status: Former     Packs/day: 1.50     Years: 40.00     Additional pack years: 0.00     Total pack years: 60.00     Types: Cigarettes     Quit date: 2000     Years since quittin.9     Passive exposure: Past   • Smokeless tobacco: Never   Vaping Use   • Vaping Use: Never used   Substance and Sexual Activity   • Alcohol use: No     Comment: hasn't used since , hx of alcoholism   • Drug use: No   • Sexual activity: Not Currently       Review of Systems    Objective   Blood pressure 140/68, height 160 cm (63\"), weight 71.9 kg (158 lb 9.6 oz), not currently breastfeeding.  Body mass index is 28.09 kg/m².    Physical Exam    Assessment & Plan     Independent Review of Radiographic Studies:      Available for my review is a carotid duplex which was performed earlier today.  A " comparison study is available from May 10, 2023.  Peak systolic velocities on the right side are 76 cm/s with end-diastolic of 37.8 and a ratio of 1.2.  This is essentially unchanged in comparison to the study from May where peak systolic velocities were 123 cm/s, end-diastolic of 44 with a ratio of 1.7.    Peak systolic velocities on the left side on today's study were 173 cm/s with a ratio of 2.42 and an end-diastolic of 38.3.  This does represent a modest increase in comparison to the study from May, where peak systolic velocities were 137 cm/s, end-diastolic of 33 with a ratio of 1.2.    The interpreting radiologist on today's study does note that there is retrograde flow in the right vertebral artery.  This appears to have been present on her    Medical Decision Making:      This is a 78-year-old woman who is about a year out from an innominate artery stent.  Unfortunately, it does look as though her stenosis has recurred.  She will need a diagnostic angiogram and possible angioplasty and retreatment of her stent.  Informed consent for this procedure was obtained from the patient.  She consents to proceed.  Will schedule her on an elective basis in the near future.  She is to remain on aspirin and Plavix indefinitely.  She is also requested a prescription for some Antivert which has helped her with her vertigo in the past, although I suspect the main reason for her vertigo is probably her subclavian stenosis.    Diagnoses and all orders for this visit:    1. Subclavian steal syndrome (Primary)  -     Case Request Cath Lab: Cerebral angiogram; Standing  -     Case Request Cath Lab: Cerebral angiogram    Other orders  -     meclizine (ANTIVERT) 12.5 MG tablet; Take 1 tablet by mouth 3 (Three) Times a Day As Needed for Dizziness.  Dispense: 90 tablet; Refill: 2        No follow-ups on file.           This document signed by MIR Concepcion MD December 4, 2023 11:02 EST

## 2023-12-11 PROBLEM — G45.8 SUBCLAVIAN STEAL SYNDROME: Status: ACTIVE | Noted: 2023-12-04

## 2023-12-19 ENCOUNTER — HOSPITAL ENCOUNTER (OUTPATIENT)
Facility: HOSPITAL | Age: 78
Setting detail: HOSPITAL OUTPATIENT SURGERY
Discharge: HOME | End: 2023-12-19
Attending: NEUROLOGICAL SURGERY | Admitting: NEUROLOGICAL SURGERY
Payer: MEDICARE

## 2023-12-19 VITALS
RESPIRATION RATE: 16 BRPM | WEIGHT: 157.8 LBS | BODY MASS INDEX: 27.96 KG/M2 | HEART RATE: 59 BPM | TEMPERATURE: 97.8 F | HEIGHT: 63 IN | SYSTOLIC BLOOD PRESSURE: 126 MMHG | DIASTOLIC BLOOD PRESSURE: 48 MMHG | OXYGEN SATURATION: 97 %

## 2023-12-19 DIAGNOSIS — G45.8 SUBCLAVIAN STEAL SYNDROME: ICD-10-CM

## 2023-12-19 LAB
ANION GAP SERPL CALCULATED.3IONS-SCNC: 12 MMOL/L (ref 5–15)
BUN SERPL-MCNC: 22 MG/DL (ref 8–23)
BUN/CREAT SERPL: 21.8 (ref 7–25)
CALCIUM SPEC-SCNC: 9.3 MG/DL (ref 8.6–10.5)
CHLORIDE SERPL-SCNC: 110 MMOL/L (ref 98–107)
CO2 SERPL-SCNC: 19 MMOL/L (ref 22–29)
CREAT BLDA-MCNC: 1.1 MG/DL (ref 0.6–1.3)
CREAT SERPL-MCNC: 1.01 MG/DL (ref 0.57–1)
DEPRECATED RDW RBC AUTO: 52 FL (ref 37–54)
EGFRCR SERPLBLD CKD-EPI 2021: 57.5 ML/MIN/1.73
ERYTHROCYTE [DISTWIDTH] IN BLOOD BY AUTOMATED COUNT: 14.4 % (ref 12.3–15.4)
GLUCOSE SERPL-MCNC: 99 MG/DL (ref 65–99)
HCT VFR BLD AUTO: 38 % (ref 34–46.6)
HGB BLD-MCNC: 12.2 G/DL (ref 12–15.9)
MCH RBC QN AUTO: 31.9 PG (ref 26.6–33)
MCHC RBC AUTO-ENTMCNC: 32.1 G/DL (ref 31.5–35.7)
MCV RBC AUTO: 99.2 FL (ref 79–97)
PLATELET # BLD AUTO: 252 10*3/MM3 (ref 140–450)
PMV BLD AUTO: 9 FL (ref 6–12)
POTASSIUM SERPL-SCNC: 4.6 MMOL/L (ref 3.5–5.2)
RBC # BLD AUTO: 3.83 10*6/MM3 (ref 3.77–5.28)
SODIUM SERPL-SCNC: 141 MMOL/L (ref 136–145)
WBC NRBC COR # BLD AUTO: 7.13 10*3/MM3 (ref 3.4–10.8)

## 2023-12-19 PROCEDURE — C1894 INTRO/SHEATH, NON-LASER: HCPCS | Performed by: NEUROLOGICAL SURGERY

## 2023-12-19 PROCEDURE — 36223 PLACE CATH CAROTID/INOM ART: CPT | Performed by: NEUROLOGICAL SURGERY

## 2023-12-19 PROCEDURE — C1760 CLOSURE DEV, VASC: HCPCS | Performed by: NEUROLOGICAL SURGERY

## 2023-12-19 PROCEDURE — 36226 PLACE CATH VERTEBRAL ART: CPT | Performed by: NEUROLOGICAL SURGERY

## 2023-12-19 PROCEDURE — 85027 COMPLETE CBC AUTOMATED: CPT | Performed by: NEUROLOGICAL SURGERY

## 2023-12-19 PROCEDURE — 82565 ASSAY OF CREATININE: CPT

## 2023-12-19 PROCEDURE — 25010000002 MIDAZOLAM PER 1 MG: Performed by: NEUROLOGICAL SURGERY

## 2023-12-19 PROCEDURE — 25010000002 HEPARIN (PORCINE) PER 1000 UNITS: Performed by: NEUROLOGICAL SURGERY

## 2023-12-19 PROCEDURE — 80048 BASIC METABOLIC PNL TOTAL CA: CPT | Performed by: NEUROLOGICAL SURGERY

## 2023-12-19 PROCEDURE — C1769 GUIDE WIRE: HCPCS | Performed by: NEUROLOGICAL SURGERY

## 2023-12-19 PROCEDURE — C1874 STENT, COATED/COV W/DEL SYS: HCPCS | Performed by: NEUROLOGICAL SURGERY

## 2023-12-19 PROCEDURE — 99153 MOD SED SAME PHYS/QHP EA: CPT | Performed by: NEUROLOGICAL SURGERY

## 2023-12-19 PROCEDURE — 25010000002 FENTANYL CITRATE (PF) 50 MCG/ML SOLUTION: Performed by: NEUROLOGICAL SURGERY

## 2023-12-19 PROCEDURE — 99152 MOD SED SAME PHYS/QHP 5/>YRS: CPT | Performed by: NEUROLOGICAL SURGERY

## 2023-12-19 PROCEDURE — 0 IODIXANOL PER 1 ML: Performed by: NEUROLOGICAL SURGERY

## 2023-12-19 PROCEDURE — 37236 OPEN/PERQ PLACE STENT 1ST: CPT | Performed by: NEUROLOGICAL SURGERY

## 2023-12-19 DEVICE — IMPLANTABLE DEVICE: Type: IMPLANTABLE DEVICE | Status: FUNCTIONAL

## 2023-12-19 RX ORDER — CLOPIDOGREL BISULFATE 75 MG/1
75 TABLET ORAL DAILY
Status: DISCONTINUED | OUTPATIENT
Start: 2023-12-20 | End: 2023-12-19 | Stop reason: HOSPADM

## 2023-12-19 RX ORDER — FLUOXETINE HYDROCHLORIDE 20 MG/1
40 CAPSULE ORAL DAILY
Status: DISCONTINUED | OUTPATIENT
Start: 2023-12-19 | End: 2023-12-19 | Stop reason: HOSPADM

## 2023-12-19 RX ORDER — MECLIZINE HCL 12.5 MG/1
12.5 TABLET ORAL 3 TIMES DAILY PRN
Status: DISCONTINUED | OUTPATIENT
Start: 2023-12-19 | End: 2023-12-19 | Stop reason: HOSPADM

## 2023-12-19 RX ORDER — IODIXANOL 320 MG/ML
INJECTION, SOLUTION INTRAVASCULAR
Status: DISCONTINUED | OUTPATIENT
Start: 2023-12-19 | End: 2023-12-19 | Stop reason: HOSPADM

## 2023-12-19 RX ORDER — AMLODIPINE BESYLATE 5 MG/1
2.5 TABLET ORAL 2 TIMES DAILY
Status: DISCONTINUED | OUTPATIENT
Start: 2023-12-19 | End: 2023-12-19 | Stop reason: HOSPADM

## 2023-12-19 RX ORDER — ATORVASTATIN CALCIUM 40 MG/1
80 TABLET, FILM COATED ORAL NIGHTLY
Status: DISCONTINUED | OUTPATIENT
Start: 2023-12-19 | End: 2023-12-19 | Stop reason: HOSPADM

## 2023-12-19 RX ORDER — MIDAZOLAM HYDROCHLORIDE 1 MG/ML
INJECTION INTRAMUSCULAR; INTRAVENOUS
Status: DISCONTINUED | OUTPATIENT
Start: 2023-12-19 | End: 2023-12-19 | Stop reason: HOSPADM

## 2023-12-19 RX ORDER — ACETAMINOPHEN 325 MG/1
650 TABLET ORAL EVERY 4 HOURS PRN
Status: DISCONTINUED | OUTPATIENT
Start: 2023-12-19 | End: 2023-12-19 | Stop reason: HOSPADM

## 2023-12-19 RX ORDER — LIDOCAINE HYDROCHLORIDE 10 MG/ML
INJECTION, SOLUTION EPIDURAL; INFILTRATION; INTRACAUDAL; PERINEURAL
Status: DISCONTINUED | OUTPATIENT
Start: 2023-12-19 | End: 2023-12-19 | Stop reason: HOSPADM

## 2023-12-19 RX ORDER — SODIUM CHLORIDE 9 MG/ML
75 INJECTION, SOLUTION INTRAVENOUS CONTINUOUS
Status: ACTIVE | OUTPATIENT
Start: 2023-12-19 | End: 2023-12-19

## 2023-12-19 RX ORDER — FENTANYL CITRATE 50 UG/ML
INJECTION, SOLUTION INTRAMUSCULAR; INTRAVENOUS
Status: DISCONTINUED | OUTPATIENT
Start: 2023-12-19 | End: 2023-12-19 | Stop reason: HOSPADM

## 2023-12-19 RX ORDER — NITROGLYCERIN 0.4 MG/1
0.4 TABLET SUBLINGUAL
Status: DISCONTINUED | OUTPATIENT
Start: 2023-12-19 | End: 2023-12-19 | Stop reason: HOSPADM

## 2023-12-19 RX ORDER — GLYCOPYRROLATE 1 MG/1
1 TABLET ORAL 2 TIMES DAILY
Status: DISCONTINUED | OUTPATIENT
Start: 2023-12-19 | End: 2023-12-19 | Stop reason: HOSPADM

## 2023-12-19 RX ORDER — ASPIRIN 81 MG/1
81 TABLET, CHEWABLE ORAL DAILY
Status: DISCONTINUED | OUTPATIENT
Start: 2023-12-20 | End: 2023-12-19 | Stop reason: HOSPADM

## 2023-12-19 RX ORDER — HEPARIN SODIUM 1000 [USP'U]/ML
INJECTION, SOLUTION INTRAVENOUS; SUBCUTANEOUS
Status: DISCONTINUED | OUTPATIENT
Start: 2023-12-19 | End: 2023-12-19 | Stop reason: HOSPADM

## 2023-12-19 RX ORDER — PANTOPRAZOLE SODIUM 40 MG/1
40 TABLET, DELAYED RELEASE ORAL
Status: DISCONTINUED | OUTPATIENT
Start: 2023-12-20 | End: 2023-12-19 | Stop reason: HOSPADM

## 2023-12-19 NOTE — Clinical Note
A 5 fr sheath was  inserted using micropuncture technique into the right femoral artery. Sheath insertion not delayed.

## 2024-01-11 ENCOUNTER — TELEMEDICINE (OUTPATIENT)
Dept: PAIN MEDICINE | Facility: CLINIC | Age: 79
End: 2024-01-11
Payer: MEDICARE

## 2024-01-11 DIAGNOSIS — M79.18 MYOFASCIAL PAIN: ICD-10-CM

## 2024-01-11 DIAGNOSIS — M47.812 CERVICAL SPONDYLOSIS WITHOUT MYELOPATHY: ICD-10-CM

## 2024-01-11 DIAGNOSIS — M54.81 OCCIPITAL NEURALGIA OF LEFT SIDE: ICD-10-CM

## 2024-01-11 DIAGNOSIS — M51.36 DDD (DEGENERATIVE DISC DISEASE), LUMBAR: ICD-10-CM

## 2024-01-11 DIAGNOSIS — M47.812 CERVICAL SPONDYLOSIS WITHOUT MYELOPATHY: Primary | ICD-10-CM

## 2024-01-11 DIAGNOSIS — Z95.5 HISTORY OF CORONARY ARTERY STENT PLACEMENT: ICD-10-CM

## 2024-01-11 DIAGNOSIS — Z98.890 HISTORY OF LUMBAR LAMINECTOMY FOR SPINAL CORD DECOMPRESSION: ICD-10-CM

## 2024-01-11 NOTE — PROGRESS NOTES
"Type of Service: Consult via telemedicine  Mode of transmission: [employer] .  Telemedicine Provider: HUE Tatum  Location of Physician: Office   Patient location: Home   You have chosen to receive care through a telehealth visit.  Do you consent to use a video/audio connection for your medical care today?    Yes         Chief Complaint: \"Neck pain and headaches.\"     History of Present Illness: Ms. Alysia Aguayo, 78 y.o. female originally referred by Dr. Dionicio Pollard in consultation for chronic neck pain.  She also has an ongoing history of left occipital neuralgia, from which she underwent left greater occipital nerve blocks combined with trigger point injections at the bilateral levator scapulae and bilateral trapezius, performed on October 8, 2022, which has provided her with 70 to 80% reduction of her pain and headaches, lasting 6 months.  More recently she contacted the office in regards to a follow-up appointment to address her recurrent occipital headaches, neck pain. Additionally in regards to her mechanical neck pain she underwent cervical facet joint injections bilateral C5-C6 and bilateral C6-C7, from which she reports experiencing about 60-70%% pain relief and functional improvement lasting over 1 year.  She is currently experiencing daily left-sided occipital headaches and neck pain.     Pain history: Patient reports a 20+-year history of pain, which began after riding a roller coaster.   Pain description: constant pain with intermittent exacerbation, described as dull/tooth ache and throbbing sensation.   Radiation of pain: The neck pain radiates into the left occipital region and into bilateral shoulder blades (LT>RT) NECK>HEADACHES  Pain intensity today: 6/10  Average pain intensity last week: 5/10  Pain intensity ranges from: 5/10 to 8/10  Aggravating factors:  Pain increases with flexion, extension and rotation of the cervical spine.   Alleviating factors: Pain decreases with Tylenol, " ice, heat, icy hot,   Associated symptoms:   Patient denies pain, numbness or weakness in the upper extremities. She reports an some intertmittent tingling in her LUE.  Patient denies any new bladder or bowel problems.   Patient denies difficulties with her balance or recent falls.   Patient reports left occipital headaches associated with her neck pain 1-2x per week     Review of previous therapies and additional medical records:  Alysia Aguayo has already failed the following measures, including:   Conservative measures: oral analgesics, physical therapy, ice and heat   Interventional measures:   10/8/2022: left greater occipital nerve block combined with trigger point injection at the bilateral levator scapulae and bilateral trapezius  03/23/2022: left greater occipital nerve block combined with trigger point injection at the bilateral levator scapulae and bilateral trapezius  03/17/2021: left greater occipital nerve block combined with trigger point injection at the bilateral levator scapulae and bilateral trapezius  10/05/2020: left greater occipital nerve block combined with trigger point injection at the bilateral levator scapulae and bilateral trapezius  01/20/2020: left greater occipital NB, and TPI at the left levator scapulae  09/11/2019: DxTx left greater occipital NB, and TPI at the left levator scapulae  07/24/2019: diagnostic and therapeutic cervical facet joint injections bilateral C5-C6 and C6-C7 combined with trigger point injections at the bilateral levator scapula and bilateral trapezius  Patient underwent diagnostic/therapeutic right cervical facet joint injections at C5-C6, C6-C7, combined with trigger point injections at the right trapezius, and right levator scapulae on 07/22/2015 and reports complete pain relief for almost 2 years.   Surgical measures: No history of cervical spine surgery  Alysia Aguayo underwent neurosurgical consultation with Dr. Dionicio Pollard on 05/12/2015, and was  found not to be a surgical candidate for her neck.  Alysia Aguayo presents with significant comorbidities including depression, hypertension, hyperlipidemia, coronary artery disease; engaged in treatment.   In terms of current analgesics, Alysia Aguayo takes: Tylenol, tizanidine, Voltaren gel. Patient also takes Prozac  I have reviewed Karthik Report is consistent to medication reconciliation.    Global Pain Scale 08-21 2018 05-20 2019 07-18 2019 08-29 2019 01-06- 2020 09-20 2020 05-25 2021 03-07 2022       Pain 15 12 13 20 8 18 13 17       Feelings 0 0 2   4 3 4 1 8       Clinical outcomes 3 3 1   3 9 7 2 10       Activities 9 0 2 25 3 13 13 3       GPS Total: 27 15 18 52 23 42 29 38         Review of Diagnostic Studies:   There are no new recent diagnostics for review  MRI of the cervical spine without contrast on February 20, 2019, radiology report: Mild to moderate degenerative disc disease in the lower cervical spine.  Bone marrow signal is within normal limits.  Visualized portions of the posterior fossa are unremarkable.  Cervical cord demonstrates normal course, caliber, and signal characteristics.   C2-C3: Moderate facet arthrosis causing mild right neuroforaminal stenosis.  Left neuroforamina is preserved.  The central canal is intact.    C3-C4: Mild diffuse disc osteophytes and uncovertebral degenerative change on the left causing mild to moderate left foraminal stenosis.  Right neuroforaminal is preserved.  Mild canal stenosis  C4-C5: Mild diffuse disc osteophyte with moderate right facet arthrosis.  Mild bilateral neuroforaminal stenosis.  Mild canal stenosis  C5-C6: Diffuse disc osteophyte and uncovertebral degenerative change.  Advanced bilateral neuroforaminal stenosis and mild canal stenosis  C6-C7: Diffuse disc osteophyte and uncovertebral degenerative change causing at least mild central canal stenosis with mild impingement of the ventral cord and advanced bilateral neuroforaminal  stenosis  C7-T1: No significant disc herniation or protrusion.  No canal stenosis or foraminal stenosis.  MRI of the lumbar spine without contrast on February 20, 2019, radiology report, alignment is normal.  Vertebral heights are well-maintained.  Normal bone marrow signal.  Conus medullaris is unremarkable.  L1-L2: Diffuse annular bulge and moderate facet arthrosis.  Right hemilaminectomy.  There is mild bilateral subarticular recess stenosis.  Moderate to advanced right foraminal stenosis.  L2-L3: Diffuse annular bulge and moderate facet arthrosis with left asymmetric posterolateral protrusion.  Moderate to advanced central canal stenosis and advanced left subarticular recess stenosis.  Mild bilateral neuroforaminal stenosis  L3-L4: Diffuse annular bulge and moderate facet arthrosis causing advanced central canal stenosis.  Mild right neuroforaminal stenosis.  Broad-based left foraminal protrusion with advanced left foraminal stenosis with impingement of the exiting left L3 nerve root.  L4-L5: Diffuse annular bulge and moderate facet arthrosis causing moderate to advanced central canal stenosis.  Mild bilateral neuroforaminal stenosis  L5-S1: Diffuse annular bulge and moderate facet arthrosis resulting in mild bilateral subarticular recess stenosis.  Mild bilateral neuroforaminal stenosis  XR SPINE, LUMBAR, AP AND LATERAL WITH FLEXION AND EXTENSION-03/06/2019:  Extensive degenerative changes seen at the L1/L2 level. Slight scoliotic curvature with convexity to the right. Alignment stable in flexion and extension.    Review of Systems   Musculoskeletal:  Positive for neck pain and neck stiffness.   Neurological:  Positive for headaches.   All other systems reviewed and are negative.     Patient Active Problem List   Diagnosis    Cervical spondylosis without myelopathy    Myofascial pain    Frequent headaches    History of alcohol abuse    CAD s/p stent placement    History of lumbar laminectomy for spinal cord  decompression    Occipital neuralgia of left side    Lumbar stenosis with neurogenic claudication    DDD (degenerative disc disease), lumbar    Anxiety and Depression    Left arm numbness    Essential hypertension    Mixed hyperlipidemia    Stroke-like symptoms    Cerebrovascular accident (CVA), unspecified mechanism    H/O: R MCA CVA (cerebrovascular accident)    Coronary arteriosclerosis in native artery    Osteoarthritis    History of hysterectomy    Encounter for screening mammogram for malignant neoplasm of breast    Screening for colon cancer    Vaginal itching    Lichen sclerosus of female genitalia    Osteopenia, senile    Subclavian steal syndrome       Past Medical History:   Diagnosis Date    Arthritis     Back pain     Carotid artery disease     Coronary artery disease     Depression     GERD (gastroesophageal reflux disease)     History of alcoholism     Hyperlipidemia     Hypertension     Kidney disease     was stage 4 but taking excessive amounts of ibuprofen, f/u with nephrologist at the time, resolved since and was to see nephrologist as needed     Neck pain     Spastic colon     Spinal stenosis     Stroke 2022    Vaginal itching     Wears dentures     Wears reading eyeglasses          Past Surgical History:   Procedure Laterality Date    CARDIAC CATHETERIZATION  2010    x2     CATARACT EXTRACTION Bilateral     CEREBRAL ANGIOGRAM N/A 12/19/2023    Procedure: Cerebral angiogram;  Surgeon: Carlos Concepcion MD;  Location:  [facility];  Service: Interventional Radiology;  Laterality: N/A;  Critical access hospital same day Dr. Concepcion is available. w/o anesthesia.     Subclavian cyst, subclavian steal    CHOLECYSTECTOMY      COLONOSCOPY  2016    CORONARY STENT PLACEMENT  2010    x3     ESOPHAGEAL DILATATION      HYSTERECTOMY      INTERVENTIONAL RADIOLOGY PROCEDURE N/A 11/03/2022    Procedure: IR angiogram vertebral cervical intracranial;  Surgeon: Carlos Concepcion MD;  Location:  [facility];  Service: Interventional Radiology;  Laterality: N/A;  w/ possible stenting    LUMBAR DISCECTOMY  07/10/2014    Rt- L1/2 Dr. Dionicio Pollard     LUMBAR LAMINECTOMY DISCECTOMY DECOMPRESSION N/A 10/17/2019    Procedure: LUMBAR LAMINECTOMY L3-5;  Surgeon: Dionicio Pollard MD;  Location: [facility] OR;  Service: Neurosurgery    OTHER SURGICAL HISTORY      CAROTID ANGIOGRAM 2023 PER DR. FAUST    TUBAL ABDOMINAL LIGATION      UPPER GASTROINTESTINAL ENDOSCOPY           Family History   Problem Relation Age of Onset    Heart disease Mother     Emphysema Mother     Coronary artery disease Mother     Cancer Father     Lung cancer Father     Colon cancer Neg Hx          Social History     Socioeconomic History    Marital status:    Tobacco Use    Smoking status: Former     Packs/day: 1.50     Years: 40.00     Additional pack years: 0.00     Total pack years: 60.00     Types: Cigarettes     Quit date: 2000     Years since quittin.0     Passive exposure: Past    Smokeless tobacco: Never   Vaping Use    Vaping Use: Never used   Substance and Sexual Activity    Alcohol use: No     Comment: hasn't used since , hx of alcoholism    Drug use: No    Sexual activity: Not Currently           Current Outpatient Medications:     acetaminophen (TYLENOL) 325 MG tablet, Take 2 tablets by mouth Every 4 (Four) Hours As Needed for Mild Pain or Moderate Pain., Disp: , Rfl:     amLODIPine (NORVASC) 5 MG tablet, Take 0.5 tablets by mouth 2 (Two) Times a Day., Disp: , Rfl:     aspirin 81 MG chewable tablet, Chew 1 tablet Daily., Disp: 30 tablet, Rfl: 1    atorvastatin (LIPITOR) 80 MG tablet, Take 1 tablet by mouth Every Night., Disp: 90 tablet, Rfl: 2    clobetasol prop emollient base (TEMOVATE) 0.05 % emollient cream, Apply a small amount to affected area twice daily for 6 to 8 weeks or until symptoms completely resolved.  Then apply 3 times weekly for maintenance., Disp: 30 g, Rfl: 2    clopidogrel (PLAVIX)  75 MG tablet, Take 1 tablet by mouth Daily., Disp: 30 tablet, Rfl: 1    Diclofenac Sodium (VOLTAREN) 1 % gel gel, Apply 4 g topically to the appropriate area as directed 4 (Four) Times a Day As Needed (pain)., Disp: 4 g, Rfl: 5    Esomeprazole Magnesium (NEXIUM PO), Take 75 mg by mouth Before Breakfast., Disp: , Rfl:     FLUoxetine (PROzac) 40 MG capsule, Take 1 capsule by mouth Daily., Disp: 90 capsule, Rfl: 1    glycopyrrolate (ROBINUL) 1 MG tablet, One tablet by mouth BID, Disp: , Rfl:     meclizine (ANTIVERT) 12.5 MG tablet, Take 1 tablet by mouth 3 (Three) Times a Day As Needed for Dizziness., Disp: 90 tablet, Rfl: 2      Allergies   Allergen Reactions    Penicillins Hives    Codeine Nausea And Vomiting    Imodium A-D [Loperamide Hcl] Nausea And Vomiting         There were no vitals taken for this visit.      Due to the constraints of the telemedicine format, no physical exam was performed     Physical Exam: (Previous PE from last office visit)  Constitutional: Patient is oriented to person, place, and time. Patient appears well-developed and well-nourished.   HENT: Head: Normocephalic and atraumatic. Eyes: Conjunctivae and lids are normal. Pupils: Equal, round, reactive to light.   Neck: Trachea normal. Neck supple. No JVD present.   Lymphatic: No cervical adenopathy  Pulmonary Respiratory effort: No increased work of breathing or signs of respiratory distress.   Musculoskeletal   Gait and station: Gait evaluation demonstrated a normal gait   Cervical spine: Passive and active range of motion are almost full and but reproduce pain. Flexion, extension and rotation of the cervical spine increased and reproduced pain. Cervical facet joint loading maneuvers are positive. Palpation of the left occipital and suboccipital region reproduces pain  Muscles: Presence of active trigger points at the bilateral levator scapulae and bilateral trapezius   Shoulders: The range of motion of the glenohumeral joints is full and  without pain, there is pain with ROM on the left. Rotator cuff strength is 5/5.Neurological: Patient is alert and oriented to person, place, and time. Speech: speech is normal. Cortical function: Normal mental status.   Cranial nerves: Cranial nerves 2-12 intact.   Reflex Scores:  Right brachioradialis: 3+  Left brachioradialis: 3+  Right biceps: 3+  Left biceps: 3+  Right triceps: 3+  Left triceps: 3+  Motor strength: 5/5  Motor Tone: normal tone.   Involuntary movements: none.   Superficial/Primitive Reflexes: primitive reflexes were absent.   Right Fox: absent  Left Fox: absent  Right ankle clonus: absent  Left ankle clonus: absent   Spurling sign is negative. Lhermitte sign is negative. Negative long tract signs.  Sensation: No sensory loss. Sensory exam: intact to light touch, intact pain and temperature sensation, intact vibration sensation and normal proprioception.   Coordination: Normal finger to nose and heel to shin. Normal balance and negative. Romberg's sign negative.   Skin and subcutaneous tissue: Skin is warm and intact. No rash noted. No cyanosis.   Psychiatric: Judgment and insight: Normal. Orientation to person, place and time: Normal. Recent and remote memory: Intact. Mood and affect: Normal.     ASSESSMENT:   1. Cervical spondylosis without myelopathy    2. Occipital neuralgia of left side    3. Myofascial pain    4. DDD (degenerative disc disease), lumbar    5. History of lumbar laminectomy for spinal cord decompression    6. History of coronary artery stent placement           PLAN/MEDICAL DECISION MAKING: Ms. Alysia Aguayo, 78 y.o. female has a longstanding history of chronic neck pain associated with occipital headaches.  MRI of the cervical spine without contrast performed in 2019, revealed multilevel disc degeneration with facet arthritis.  There are multiple levels that contain spinal canal as well as neural foraminal stenosis.  She has been evaluated by neurosurgery in the  past for her neck pain, Dr. Dionicio Pollard, and was found not to be a surgical candidate.  Over the years, she has remarkably responded to interventional pain management measures as well as conservatism.  She continues with mechanical/axial neck pain, as well as predominantly left-sided occipital neuralgia.  She continues to deny any obvious radicular complaints, pain or numbness.  I had a lengthy conversation with Ms. Alysia Aguayo regarding her chronic pain condition and potential therapeutic options including risks, benefits, alternative therapies, to name a few. Patient has failed to obtain pain relief with conservative measures, as referenced above. She continues to respond exceedingly well to interventional pain management procedures as referenced above.  I have reviewed all available patient's medical records as well as previous therapies as referenced above.  Therefore, I have proposed the following plan:  1. Interventional pain management measures: Due to patient's significant analgesic benefit following cervical facet joint injections performed on 6/9/2021, which provided her with 60 to 70% pain relief lasting over 1 year, we will proceed with bilateral cervical facet joint injections at C5-C6, C6-C7. If patient fails to obtain sustained pain relief, then, we will proceed with diagnostic bilateral cervical medial branch blocks at C4, C5, C6; for bilateral cervical facet joints at C5-C6, C6-C7 to clarify the origin of her unrelenting pain. If patient experiences more than 80% pain relief along with significant improvement in the range of motion of the cervical spine, then, patient will be scheduled for a second set of diagnostic bilateral cervical medial branch blocks, to then, proceed with cervical medial branch rhizotomies.  Otherwise, may consider left greater occipital nerve block by hydrodissection technique under ultrasound and fluoroscopic guidance combined with trigger point injections at the  bilateral levator scapulae and bilateral trapezius to maximize her rehabilitation.   2. Diagnostics:  A. Depending on continued response, we may consider repeating MRI of the cervical spine without contrast.  3. Pharmacological measures. Reviewed and Discussed.   A. Tylenol. Patient also takes Prozac  B. Continue Voltaren gel  C. Continue tizanidine 2 mg take ½ tablet three times a day as needed for muscle spasms  D. Continue Rhuemate one capsule daily  4.  Long-term rehabilitation efforts:  A. The patient does not have a history of falls. I did complete a risk assessment for falls.   B. Patient will start a comprehensive physical therapy program for water therapy, upper body strengthening/posture correction, gait and balance training, neurodynamics, myofascial release, cupping and dry needling   C. Start an exercise program such as yoga and water therapy  D. Use TENS unit on regular basis  E. Contrast therapy: Apply ice-packs for 15-20 minutes, followed by heating pads for 15-20 minutes to affected area   5. The patient has been instructed to contact my office with any questions or difficulties. The patient understands the plan and agrees to proceed accordingly.    Total time of discussion was 12 minutes.     Pain Medications               acetaminophen (TYLENOL) 325 MG tablet Take 2 tablets by mouth Every 4 (Four) Hours As Needed for Mild Pain or Moderate Pain.    aspirin 81 MG chewable tablet Chew 1 tablet Daily.    FLUoxetine (PROzac) 40 MG capsule Take 1 capsule by mouth Daily.               Patient Care Team:  Yesica Zhu PA-C as PCP - General  Dionicio Pollard MD as Consulting Physician (Neurosurgery)  Heriberto Lopes MD as Consulting Physician (Pain Medicine)  Alana Wang APRN as Nurse Practitioner (Pain Medicine)     No orders of the defined types were placed in this encounter.        No future appointments.        HUE Valdivia        normal...

## 2024-01-15 DIAGNOSIS — M47.812 CERVICAL SPONDYLOSIS WITHOUT MYELOPATHY: Primary | ICD-10-CM

## 2024-01-17 ENCOUNTER — OUTSIDE FACILITY SERVICE (OUTPATIENT)
Dept: PAIN MEDICINE | Facility: CLINIC | Age: 79
End: 2024-01-17
Payer: MEDICARE

## 2024-01-17 ENCOUNTER — DOCUMENTATION (OUTPATIENT)
Dept: PAIN MEDICINE | Facility: CLINIC | Age: 79
End: 2024-01-17

## 2024-01-17 DIAGNOSIS — Z98.890 HISTORY OF LUMBAR LAMINECTOMY: ICD-10-CM

## 2024-01-17 DIAGNOSIS — M54.16 LUMBAR RADICULOPATHY: ICD-10-CM

## 2024-01-17 DIAGNOSIS — M96.1 LUMBAR POSTLAMINECTOMY SYNDROME: ICD-10-CM

## 2024-01-17 DIAGNOSIS — Z01.818 PREOP TESTING: Primary | ICD-10-CM

## 2024-01-17 DIAGNOSIS — M48.062 LUMBAR STENOSIS WITH NEUROGENIC CLAUDICATION: ICD-10-CM

## 2024-01-17 PROCEDURE — 99152 MOD SED SAME PHYS/QHP 5/>YRS: CPT | Performed by: ANESTHESIOLOGY

## 2024-01-17 PROCEDURE — 64491 INJ PARAVERT F JNT C/T 2 LEV: CPT | Performed by: ANESTHESIOLOGY

## 2024-01-17 PROCEDURE — 64490 INJ PARAVERT F JNT C/T 1 LEV: CPT | Performed by: ANESTHESIOLOGY

## 2024-01-17 NOTE — PROGRESS NOTES
Alysia Aguayo has a long-standing history of lumbar pain and neurogenic claudication s/p 2 previous lumbar laminectomies. She reports progressive recurrence of claudicatory symptoms with pain extending into her left hip and LLE >>> RLE associated with numbness and weakness. No MRI after last surgery. Failed conservative measures.   Left hip: Almost full ROM w/o pain. Right slightly decreased w/o pain  Lumbar: ROM slightly decreased w/o pain.   SI: Provocative maneuvers: Negative  SLR: Negative. FSS: Negative  A: Lumbar postlaminectomy syndrome/lumbar stenosis with neurogenic claudication/ Hx lumbar decompression x 2 with recurrent symptoms  P: Patient has failed conservative measures. There has been significant progression of symptoms affecting patient's mobility and affecting QOL. Will get new diagnostic studies to complete assessment  MRI lumbar and thoracic w/o contrast (avoid contrast due to comorbidities), flexion and extension X-rays of the lumbar spine to assess stability. F/U thereafter        Heriberto Lopes MD

## 2024-01-22 ENCOUNTER — DOCUMENTATION (OUTPATIENT)
Dept: NEUROSURGERY | Facility: CLINIC | Age: 79
End: 2024-01-22
Payer: MEDICARE

## 2024-01-22 NOTE — PROGRESS NOTES
I spoke with the patient via telephone.  She has been taking her blood pressure in both arms and reports that it is symmetric.  She is still taking aspirin and Plavix.  She still has days where she is occasionally somewhat dizzy although it sounds more vertiginous to me and she believes it may be related to her inner ear dysfunction.  In any case, assuming that her blood pressures stay symmetric and her symptoms do not get any worse, my recommendation would be for indefinite dual antiplatelet therapy with a carotid ultrasound in 5 months.  I reviewed the signs and symptoms of stroke with her.  I will follow-up with her in about 5 months after her carotid ultrasound.

## 2024-01-26 DIAGNOSIS — I63.9 CEREBROVASCULAR ACCIDENT (CVA), UNSPECIFIED MECHANISM: Primary | ICD-10-CM

## 2024-01-26 DIAGNOSIS — I63.511 ACUTE ISCHEMIC RIGHT MCA STROKE: ICD-10-CM

## 2024-01-26 DIAGNOSIS — I65.01 STENOSIS OF RIGHT VERTEBRAL ARTERY: ICD-10-CM

## 2024-02-08 ENCOUNTER — OFFICE VISIT (OUTPATIENT)
Dept: FAMILY MEDICINE CLINIC | Facility: CLINIC | Age: 79
End: 2024-02-08
Payer: MEDICARE

## 2024-02-08 VITALS
WEIGHT: 157 LBS | SYSTOLIC BLOOD PRESSURE: 120 MMHG | HEART RATE: 82 BPM | HEIGHT: 63 IN | BODY MASS INDEX: 27.82 KG/M2 | DIASTOLIC BLOOD PRESSURE: 64 MMHG | OXYGEN SATURATION: 98 %

## 2024-02-08 DIAGNOSIS — E78.2 HYPERLIPIDEMIA, MIXED: ICD-10-CM

## 2024-02-08 DIAGNOSIS — I10 HYPERTENSION, ESSENTIAL: ICD-10-CM

## 2024-02-08 DIAGNOSIS — F33.1 MAJOR DEPRESSIVE DISORDER, RECURRENT, MODERATE: Primary | ICD-10-CM

## 2024-02-08 PROCEDURE — 3074F SYST BP LT 130 MM HG: CPT | Performed by: PHYSICIAN ASSISTANT

## 2024-02-08 PROCEDURE — 3078F DIAST BP <80 MM HG: CPT | Performed by: PHYSICIAN ASSISTANT

## 2024-02-08 PROCEDURE — 99214 OFFICE O/P EST MOD 30 MIN: CPT | Performed by: PHYSICIAN ASSISTANT

## 2024-02-08 NOTE — PROGRESS NOTES
"Chief Complaint  Depression (6 month med check )    Subjective        Alysia Aguayo presents to [facility]  History of Present Illness  Patient in today for medication recheck and refill. States the fluoxetine continues to help with depression symptoms and would like to continue on current dosage. Denies side effects of medication. She is due for mammogram- declines being scheduled at this time- states may consider. Denies any changes to bowel or urine habits.She continues to follow with pain management as well as cardiology. States has been feeling well. States does has had a few episodes of chest pressure over past several months- but none in past month. Denies shortness of breath.   Depression  Visit Type: follow-up  Patient presents with the following symptoms: depressed mood.  Patient is not experiencing: chest pain, nausea, nervousness/anxiety, palpitations, shortness of breath, suicidal ideas, suicidal planning and thoughts of death.        Review of Systems   Constitutional:  Negative for fatigue and fever.   HENT:  Negative for congestion and sore throat.    Respiratory:  Negative for cough and shortness of breath.    Cardiovascular:  Positive for chest pain. Negative for palpitations and leg swelling.   Gastrointestinal:  Negative for abdominal pain, diarrhea, nausea and vomiting.   Genitourinary:  Negative for dysuria and frequency.   Neurological:  Negative for dizziness and headache.   Psychiatric/Behavioral:  Positive for depressed mood. Negative for suicidal ideas. The patient is not nervous/anxious.       Objective   Vital Signs:  /64   Pulse 82   Ht 160 cm (63\")   Wt 71.2 kg (157 lb)   SpO2 98%   BMI 27.81 kg/m²   Estimated body mass index is 27.81 kg/m² as calculated from the following:    Height as of this encounter: 160 cm (63\").    Weight as of this encounter: 71.2 kg (157 lb).               Physical Exam  Constitutional:       Appearance: Normal " appearance.   HENT:      Right Ear: Tympanic membrane normal.      Left Ear: Tympanic membrane normal.      Mouth/Throat:      Mouth: Mucous membranes are moist.   Eyes:      Pupils: Pupils are equal, round, and reactive to light.   Cardiovascular:      Rate and Rhythm: Normal rate and regular rhythm.      Heart sounds: Normal heart sounds.   Pulmonary:      Effort: Pulmonary effort is normal.      Breath sounds: Normal breath sounds.   Abdominal:      Palpations: Abdomen is soft.   Neurological:      Mental Status: She is alert and oriented to person, place, and time.   Psychiatric:         Mood and Affect: Mood normal.         Behavior: Behavior normal.        Result Review :                     Assessment and Plan     Diagnoses and all orders for this visit:    1. Major depressive disorder, recurrent, moderate (Primary)  She is pleased with the current dosage of fluoxetine and would like to continue. Denies side effects. RTC prior to recheck as needed.   2. Hypertension, essential  Continue current medication and f/up with cardiology. With intermittent chest pressure, recommend to check EKG today- patient declines. She states will call to schedule f/up with her cardiologist. Discussed if any acute chest pain prior, recommend to rtc or to ER for further evaluation- patient voices understanding.   3. Hyperlipidemia, mixed  Continue medication . She believes specialist will be checking her labs but may also rtc here for fasting labs if they would prefer.   Other orders  -     Discontinue: FLUoxetine (PROzac) 40 MG capsule; Take 1 capsule by mouth Daily.  Dispense: 30 capsule; Refill: 0  -     FLUoxetine (PROzac) 40 MG capsule; Take 1 capsule by mouth Daily.  Dispense: 90 capsule; Refill: 1             Follow Up     No follow-ups on file.  Patient was given instructions and counseling regarding her condition or for health maintenance advice. Please see specific information pulled into the AVS if appropriate.

## 2024-02-09 RX ORDER — FLUOXETINE HYDROCHLORIDE 40 MG/1
40 CAPSULE ORAL DAILY
Qty: 30 CAPSULE | Refills: 0 | Status: SHIPPED | OUTPATIENT
Start: 2024-02-09 | End: 2024-02-09 | Stop reason: SDUPTHER

## 2024-02-09 RX ORDER — FLUOXETINE HYDROCHLORIDE 40 MG/1
40 CAPSULE ORAL DAILY
Qty: 90 CAPSULE | Refills: 1 | Status: SHIPPED | OUTPATIENT
Start: 2024-02-09

## 2024-02-12 ENCOUNTER — TELEPHONE (OUTPATIENT)
Dept: FAMILY MEDICINE CLINIC | Facility: CLINIC | Age: 79
End: 2024-02-12

## 2024-02-12 RX ORDER — DOXYCYCLINE HYCLATE 100 MG/1
100 CAPSULE ORAL 2 TIMES DAILY
Qty: 14 CAPSULE | Refills: 0 | Status: SHIPPED | OUTPATIENT
Start: 2024-02-12 | End: 2024-02-19

## 2024-02-12 NOTE — TELEPHONE ENCOUNTER
Caller: Alysia Aguayo    Relationship: Self    Best call back number : [phone]    What was the call regarding: WENT TO PHARMACY AND THE ANTIBIOTIC WAS NOT THERE BUT PLAVIX WAS CALLED IN INSTEAD. WOULD LIKE HER ANTIBIOTIC CALLED IN TO THE PHARMACY [facility] [address] [place] [address] [phone] [facility] [phone]  [phone]       Is it okay if the provider responds through MyChart: NO

## 2024-03-06 ENCOUNTER — HOSPITAL ENCOUNTER (OUTPATIENT)
Dept: MRI IMAGING | Facility: HOSPITAL | Age: 79
Discharge: HOME | End: 2024-03-06
Payer: MEDICARE

## 2024-03-06 ENCOUNTER — HOSPITAL ENCOUNTER (OUTPATIENT)
Dept: GENERAL RADIOLOGY | Facility: HOSPITAL | Age: 79
Discharge: HOME | End: 2024-03-06
Payer: MEDICARE

## 2024-03-06 PROCEDURE — 72148 MRI LUMBAR SPINE W/O DYE: CPT

## 2024-03-06 PROCEDURE — 72120 X-RAY BEND ONLY L-S SPINE: CPT

## 2024-03-06 PROCEDURE — 72146 MRI CHEST SPINE W/O DYE: CPT

## 2024-03-29 ENCOUNTER — TELEPHONE (OUTPATIENT)
Dept: FAMILY MEDICINE CLINIC | Facility: CLINIC | Age: 79
End: 2024-03-29
Payer: MEDICARE

## 2024-03-29 NOTE — TELEPHONE ENCOUNTER
Name: Dede Alysia Sommers      Relationship: Self      Best Callback Number: [phone]       HUB PROVIDED THE RELAY MESSAGE FROM THE OFFICE      PATIENT: VOICED UNDERSTANDING AND HAS NO FURTHER QUESTIONS AT THIS TIME    ADDITIONAL INFORMATION:

## 2024-06-10 ENCOUNTER — HOSPITAL ENCOUNTER (OUTPATIENT)
Dept: CARDIOLOGY | Facility: HOSPITAL | Age: 79
Discharge: HOME | End: 2024-06-10
Admitting: NEUROLOGICAL SURGERY
Payer: MEDICARE

## 2024-06-10 ENCOUNTER — OFFICE VISIT (OUTPATIENT)
Dept: NEUROSURGERY | Facility: CLINIC | Age: 79
End: 2024-06-10
Payer: MEDICARE

## 2024-06-10 VITALS — HEIGHT: 63 IN | WEIGHT: 154.3 LBS | BODY MASS INDEX: 27.34 KG/M2 | HEART RATE: 90 BPM | OXYGEN SATURATION: 97 %

## 2024-06-10 DIAGNOSIS — G45.8 SUBCLAVIAN STEAL SYNDROME: Primary | ICD-10-CM

## 2024-06-10 DIAGNOSIS — I63.511 ACUTE ISCHEMIC RIGHT MCA STROKE: ICD-10-CM

## 2024-06-10 DIAGNOSIS — I63.9 CEREBROVASCULAR ACCIDENT (CVA), UNSPECIFIED MECHANISM: ICD-10-CM

## 2024-06-10 LAB
BH CV XLRA MEAS LEFT DIST CCA EDV: 15.6 CM/SEC
BH CV XLRA MEAS LEFT DIST CCA PSV: 51 CM/SEC
BH CV XLRA MEAS LEFT DIST ICA EDV: 15.9 CM/SEC
BH CV XLRA MEAS LEFT DIST ICA PSV: 69.8 CM/SEC
BH CV XLRA MEAS LEFT ICA/CCA RATIO: 1.47
BH CV XLRA MEAS LEFT MID CCA EDV: 12 CM/SEC
BH CV XLRA MEAS LEFT MID CCA PSV: 57.1 CM/SEC
BH CV XLRA MEAS LEFT MID ICA EDV: 25.3 CM/SEC
BH CV XLRA MEAS LEFT MID ICA PSV: 84.1 CM/SEC
BH CV XLRA MEAS LEFT PROX CCA EDV: 10.5 CM/SEC
BH CV XLRA MEAS LEFT PROX CCA PSV: 64.6 CM/SEC
BH CV XLRA MEAS LEFT PROX ECA PSV: 71 CM/SEC
BH CV XLRA MEAS LEFT PROX ICA EDV: 21.1 CM/SEC
BH CV XLRA MEAS LEFT PROX ICA PSV: 64.3 CM/SEC
BH CV XLRA MEAS LEFT PROX SCLA PSV: 114.24 CM/SEC
BH CV XLRA MEAS LEFT VERTEBRAL A EDV: 21.2 CM/SEC
BH CV XLRA MEAS LEFT VERTEBRAL A PSV: 87.2 CM/SEC
BH CV XLRA MEAS RIGHT DIST CCA EDV: 13.4 CM/SEC
BH CV XLRA MEAS RIGHT DIST CCA PSV: 58.4 CM/SEC
BH CV XLRA MEAS RIGHT DIST ICA EDV: 24 CM/SEC
BH CV XLRA MEAS RIGHT DIST ICA PSV: 79.3 CM/SEC
BH CV XLRA MEAS RIGHT ICA/CCA RATIO: 1.56
BH CV XLRA MEAS RIGHT MID CCA EDV: 13.2 CM/SEC
BH CV XLRA MEAS RIGHT MID CCA PSV: 62.6 CM/SEC
BH CV XLRA MEAS RIGHT MID ICA EDV: 25.9 CM/SEC
BH CV XLRA MEAS RIGHT MID ICA PSV: 97.9 CM/SEC
BH CV XLRA MEAS RIGHT PROX CCA EDV: 11 CM/SEC
BH CV XLRA MEAS RIGHT PROX CCA PSV: 60.9 CM/SEC
BH CV XLRA MEAS RIGHT PROX ECA PSV: 94.8 CM/SEC
BH CV XLRA MEAS RIGHT PROX ICA EDV: 15.3 CM/SEC
BH CV XLRA MEAS RIGHT PROX ICA PSV: 49.7 CM/SEC
BH CV XLRA MEAS RIGHT PROX SCLA PSV: 233.25 CM/SEC
BH CV XLRA MEAS RIGHT VERTEBRAL A EDV: 11.7 CM/SEC
BH CV XLRA MEAS RIGHT VERTEBRAL A PSV: 38.7 CM/SEC
LEFT ARM BP: NORMAL MMHG
RIGHT ARM BP: NORMAL MMHG

## 2024-06-10 PROCEDURE — 93880 EXTRACRANIAL BILAT STUDY: CPT | Performed by: INTERNAL MEDICINE

## 2024-06-10 PROCEDURE — 93880 EXTRACRANIAL BILAT STUDY: CPT

## 2024-06-10 PROCEDURE — 99213 OFFICE O/P EST LOW 20 MIN: CPT | Performed by: NEUROLOGICAL SURGERY

## 2024-06-10 RX ORDER — ATORVASTATIN CALCIUM 80 MG/1
80 TABLET, FILM COATED ORAL DAILY
Qty: 90 TABLET | Refills: 4 | Status: SHIPPED | OUTPATIENT
Start: 2024-06-10

## 2024-06-10 NOTE — PROGRESS NOTES
"Subjective     Chief Complaint: Follow-up in the innominate artery restenting    Patient ID: Alysia Aguayo is a 78 y.o. female is here today for follow-up.    History of Present Illness    This is a 78-year-old woman in whom I placed an innominate artery stent in 2022.  She required a second stent in 2023.  She presents today to discuss the results of her most recent surveillance carotid ultrasound.    The following portions of the patient's history were reviewed and updated as appropriate: allergies, current medications, past family history, past medical history, past social history, past surgical history and problem list.    Family history:   Family History   Problem Relation Age of Onset    Heart disease Mother     Emphysema Mother     Coronary artery disease Mother     Cancer Father     Lung cancer Father     Colon cancer Neg Hx        Social history:   Social History     Socioeconomic History    Marital status:    Tobacco Use    Smoking status: Former     Current packs/day: 0.00     Average packs/day: 1.5 packs/day for 40.0 years (60.0 ttl pk-yrs)     Types: Cigarettes     Start date: 1960     Quit date: 2000     Years since quittin.4     Passive exposure: Past    Smokeless tobacco: Never   Vaping Use    Vaping status: Never Used   Substance and Sexual Activity    Alcohol use: No     Comment: hasn't used since , hx of alcoholism    Drug use: No    Sexual activity: Not Currently       Review of Systems    Objective   Pulse 90, height 160 cm (63\"), weight 70 kg (154 lb 4.8 oz), SpO2 97%, not currently breastfeeding.  Body mass index is 27.33 kg/m².    Physical Exam    Assessment & Plan     Independent Review of Radiographic Studies:      Available for my review is a carotid ultrasound which was performed earlier today.  There is anterograde flow in the vertebral arteries bilaterally    Medical Decision Making:      Recommend surveillance carotid ultrasound in 6 " months.  Signs and symptoms of stroke and subclavian steal syndrome were once again reviewed with the patient.  I directed her to contact my office with new or worsening symptoms.  I will follow-up with her in 6 months after her next carotid ultrasound    Diagnoses and all orders for this visit:    1. Subclavian steal syndrome (Primary)  -     Duplex Carotid Ultrasound CAR    2. Cerebrovascular accident (CVA), unspecified mechanism    3. Acute ischemic right MCA stroke    Other orders  -     atorvastatin (Lipitor) 80 MG tablet; Take 1 tablet by mouth Daily.  Dispense: 90 tablet; Refill: 4        No follow-ups on file.           This document signed by MIR Concepcion MD Annemarie 10, 2024 16:16 EDT

## 2024-07-03 ENCOUNTER — TELEPHONE (OUTPATIENT)
Dept: PAIN MEDICINE | Facility: CLINIC | Age: 79
End: 2024-07-03
Payer: MEDICARE

## 2024-07-03 NOTE — TELEPHONE ENCOUNTER
Caller: Alysia Aguayo    Relationship to patient: Self    Best call back number: [phone]      Type of visit: -OUTSIDE FACILITY    bilateral cervical facet joint injections at C5-C6, C6-C7    Requested date: ASAP     Additional notes: PT WOULD LIKE TO SCHEDULE AN APPOINTMENT FOR  HER CERVICAL INJECTION -LAST ONE  1/11/24    PLEASE CONTACT PT AND ADVISE  TRIED TO WT- NO ANSWER

## 2024-07-12 NOTE — TELEPHONE ENCOUNTER
Patient called because she wants to see if she can be scheduled for a repeat injection. Her last injection was in January--does she need to be seen in the office first?

## 2024-07-16 NOTE — TELEPHONE ENCOUNTER
Called the patient and left VM for her to call the office to get schedule for a visit with Alana Wang

## 2024-08-15 ENCOUNTER — TELEPHONE (OUTPATIENT)
Dept: FAMILY MEDICINE CLINIC | Facility: CLINIC | Age: 79
End: 2024-08-15
Payer: MEDICARE

## 2024-08-16 NOTE — TELEPHONE ENCOUNTER
Name: Alysia Aguayo      Relationship: Self      Best Callback Number: [phone]       HUB PROVIDED THE RELAY MESSAGE FROM THE OFFICE      PATIENT: SCHEDULED PER NOTE    ADDITIONAL INFORMATION:

## 2024-08-21 ENCOUNTER — TELEMEDICINE (OUTPATIENT)
Dept: PAIN MEDICINE | Facility: CLINIC | Age: 79
End: 2024-08-21
Payer: MEDICARE

## 2024-08-21 DIAGNOSIS — M47.812 CERVICAL SPONDYLOSIS WITHOUT MYELOPATHY: ICD-10-CM

## 2024-08-21 DIAGNOSIS — M79.18 MYOFASCIAL PAIN: ICD-10-CM

## 2024-08-21 DIAGNOSIS — M96.1 LUMBAR POSTLAMINECTOMY SYNDROME: ICD-10-CM

## 2024-08-21 DIAGNOSIS — Z98.890 HISTORY OF LUMBAR LAMINECTOMY: ICD-10-CM

## 2024-08-21 DIAGNOSIS — M54.81 OCCIPITAL NEURALGIA OF LEFT SIDE: ICD-10-CM

## 2024-08-21 PROCEDURE — 1160F RVW MEDS BY RX/DR IN RCRD: CPT | Performed by: NURSE PRACTITIONER

## 2024-08-21 PROCEDURE — 1125F AMNT PAIN NOTED PAIN PRSNT: CPT | Performed by: NURSE PRACTITIONER

## 2024-08-21 PROCEDURE — 1159F MED LIST DOCD IN RCRD: CPT | Performed by: NURSE PRACTITIONER

## 2024-08-21 PROCEDURE — 99213 OFFICE O/P EST LOW 20 MIN: CPT | Performed by: NURSE PRACTITIONER

## 2024-08-21 NOTE — PROGRESS NOTES
"Type of Service: Consult via telemedicine  Mode of transmission: Viralica .  Telemedicine Provider: HUE Tatum  Location of Physician: Office   Patient location: Home   You have chosen to receive care through a telehealth visit.  Do you consent to use a video/audio connection for your medical care today?    Yes     Chief Complaint: \"Neck pain and headaches.\"     History of Present Illness: Ms. Alysia Aguayo, 78 y.o. female originally referred by Dr. Dionicio Pollard in consultation for chronic neck pain.  She also has an ongoing history of left occipital neuralgia, from which she underwent left greater occipital nerve blocks combined with trigger point injections at the bilateral levator scapulae and bilateral trapezius, performed on October 8, 2022, which has provided her with 70 to 80% reduction of her pain and headaches, lasting 6 months.  More recently she contacted the office in regards to a follow-up appointment to address her recurrent occipital headaches and neck pain.  On January 17, 2024, she underwent  cervical facet joint injections bilateral C5-C6 and bilateral C6-C7, from which she reports experiencing about 60-70% pain relief and functional improvement lasting almost 6 months.  She is currently experiencing daily left-sided occipital headaches and neck pain.     Pain history: Patient reports a 20+-year history of pain, which began after riding a roller coaster.   Pain description: constant pain with intermittent exacerbation, described as dull/tooth ache and throbbing sensation.   Radiation of pain: The neck pain radiates into the left occipital region and into bilateral shoulder blades (LT>RT) NECK>HEADACHES  Pain intensity today: 6/10  Average pain intensity last week: 5/10  Pain intensity ranges from: 5/10 to 8/10  Aggravating factors:  Pain increases with flexion, extension and rotation of the cervical spine.   Alleviating factors: Pain decreases with Tylenol, ice, heat, icy hot, "   Associated symptoms:   Patient denies pain, numbness or weakness in the upper extremities. She reports  some intertmittent tingling in her LUE.  Patient denies any new bladder or bowel problems.   Patient denies difficulties with her balance or recent falls.   Patient reports left occipital headaches associated with her neck pain 1-2x per week  Pain interferes with regular activities, ADLs, and affects patient's quality of life  Pain interferes with sleep causing sleep fragmentation   Muscle spasms  Stiffness       Review of previous therapies and additional medical records:  Alysia Aguayo has already failed the following measures, including:   Conservative measures: oral analgesics, physical therapy, ice and heat   Interventional measures: 01/17/2024: therapeutic cervical facet joint injections bilateral C5-C6 and C6-C7  10/5/2022: left greater occipital nerve block combined with trigger point injection at the bilateral levator scapulae and bilateral trapezius  03/23/2022: left greater occipital nerve block combined with trigger point injection at the bilateral levator scapulae and bilateral trapezius  03/17/2021: left greater occipital nerve block combined with trigger point injection at the bilateral levator scapulae and bilateral trapezius  10/05/2020: left greater occipital nerve block combined with trigger point injection at the bilateral levator scapulae and bilateral trapezius  01/20/2020: left greater occipital NB, and TPI at the left levator scapulae  09/11/2019: DxTx left greater occipital NB, and TPI at the left levator scapulae  07/24/2019: diagnostic and therapeutic cervical facet joint injections bilateral C5-C6 and C6-C7 combined with trigger point injections at the bilateral levator scapula and bilateral trapezius  Patient underwent diagnostic/therapeutic right cervical facet joint injections at C5-C6, C6-C7, combined with trigger point injections at the right trapezius, and right levator  scapulae on 07/22/2015 and reports complete pain relief for almost 2 years.   Surgical measures: No history of cervical spine surgery  Alysia Aguayo underwent neurosurgical consultation with Dr. Dionicio Pollard on 05/12/2015, and was found not to be a surgical candidate for her neck.  Alysia Aguayo presents with significant comorbidities including depression, hypertension, hyperlipidemia, coronary artery disease; engaged in treatment.   In terms of current analgesics, Alysia Aguayo takes: Tylenol, tizanidine, Voltaren gel. Patient also takes Prozac  I have reviewed Karthik Report is consistent to medication reconciliation.    Global Pain Scale 08-21 2018 05-20 2019 07-18 2019 08-29 2019 01-06- 2020 09-20 2020 05-25 2021 03-07 2022       Pain 15 12 13 20 8 18 13 17       Feelings 0 0 2   4 3 4 1 8       Clinical outcomes 3 3 1   3 9 7 2 10       Activities 9 0 2 25 3 13 13 3       GPS Total: 27 15 18 52 23 42 29 38         Review of New Diagnostic Studies:  MRI of the thoracic spine 3/6/2024: Multilevel thoracic spondylosis, with Modic endplate changes.  No high-grade spinal stenosis or neuroforaminal stenosis.  MRI of the lumbar spine without contrast 3/6/2024: Multilevel spondylosis, with endplate changes.  Grade 1 anterior listhesis of L4 on L5.  L1-L2: Right sided disc protrusion with facet arthritis, with mild central spinal stenosis severe right and mild left neuroforaminal stenosis.  L2-L3: Disc bulge with ligamentum flavum thickening and facet hypertrophy with moderate central spinal stenosis with mild to moderate bilateral neuroforaminal stenosis.  L3-L4: Disc bulge with a left-sided disc protrusion, with mild central spinal stenosis severe left and mild to moderate right neuroforaminal stenosis.  L4-L5: Postsurgical changes from prior decompressive laminectomy.  Disc bulging with facet hypertrophy, with moderate left and mild right neuroforaminal stenosis.  L5-S1: Postoperative changes from  prior decompressive laminectomy with a disc bulge and facet hypertrophy with severe right and mild to moderate left neuroforaminal stenosis  Lumbar spine x-rays with flexion-extension views 3/6/2024: Anterior listhesis of L4 on L5 with approximately 5 mm in extension, and increases to 67 mm with flexion.    Review of Diagnostic Studies:     MRI of the cervical spine without contrast on February 20, 2019, radiology report: Mild to moderate degenerative disc disease in the lower cervical spine.  Bone marrow signal is within normal limits.  Visualized portions of the posterior fossa are unremarkable.  Cervical cord demonstrates normal course, caliber, and signal characteristics.   C2-C3: Moderate facet arthrosis causing mild right neuroforaminal stenosis.  Left neuroforamina is preserved.  The central canal is intact.    C3-C4: Mild diffuse disc osteophytes and uncovertebral degenerative change on the left causing mild to moderate left foraminal stenosis.  Right neuroforaminal is preserved.  Mild canal stenosis  C4-C5: Mild diffuse disc osteophyte with moderate right facet arthrosis.  Mild bilateral neuroforaminal stenosis.  Mild canal stenosis  C5-C6: Diffuse disc osteophyte and uncovertebral degenerative change.  Advanced bilateral neuroforaminal stenosis and mild canal stenosis  C6-C7: Diffuse disc osteophyte and uncovertebral degenerative change causing at least mild central canal stenosis with mild impingement of the ventral cord and advanced bilateral neuroforaminal stenosis  C7-T1: No significant disc herniation or protrusion.  No canal stenosis or foraminal stenosis.  MRI of the lumbar spine without contrast on February 20, 2019, radiology report, alignment is normal.  Vertebral heights are well-maintained.  Normal bone marrow signal.  Conus medullaris is unremarkable.  L1-L2: Diffuse annular bulge and moderate facet arthrosis.  Right hemilaminectomy.  There is mild bilateral subarticular recess stenosis.   Moderate to advanced right foraminal stenosis.  L2-L3: Diffuse annular bulge and moderate facet arthrosis with left asymmetric posterolateral protrusion.  Moderate to advanced central canal stenosis and advanced left subarticular recess stenosis.  Mild bilateral neuroforaminal stenosis  L3-L4: Diffuse annular bulge and moderate facet arthrosis causing advanced central canal stenosis.  Mild right neuroforaminal stenosis.  Broad-based left foraminal protrusion with advanced left foraminal stenosis with impingement of the exiting left L3 nerve root.  L4-L5: Diffuse annular bulge and moderate facet arthrosis causing moderate to advanced central canal stenosis.  Mild bilateral neuroforaminal stenosis  L5-S1: Diffuse annular bulge and moderate facet arthrosis resulting in mild bilateral subarticular recess stenosis.  Mild bilateral neuroforaminal stenosis  XR SPINE, LUMBAR, AP AND LATERAL WITH FLEXION AND EXTENSION-03/06/2019:  Extensive degenerative changes seen at the L1/L2 level. Slight scoliotic curvature with convexity to the right. Alignment stable in flexion and extension.    Review of Systems   Musculoskeletal:  Positive for neck pain and neck stiffness.   Neurological:  Positive for headaches.   All other systems reviewed and are negative.     Patient Active Problem List   Diagnosis    Cervical spondylosis without myelopathy    Myofascial pain    Frequent headaches    History of alcohol abuse    CAD s/p stent placement    History of lumbar laminectomy for spinal cord decompression    Occipital neuralgia of left side    Lumbar stenosis with neurogenic claudication    DDD (degenerative disc disease), lumbar    Anxiety and Depression    Left arm numbness    Essential hypertension    Mixed hyperlipidemia    Stroke-like symptoms    Cerebrovascular accident (CVA), unspecified mechanism    H/O: R MCA CVA (cerebrovascular accident)    Coronary arteriosclerosis in native artery    Osteoarthritis    History of hysterectomy     Encounter for screening mammogram for malignant neoplasm of breast    Screening for colon cancer    Vaginal itching    Lichen sclerosus of female genitalia    Osteopenia, senile    Subclavian steal syndrome       Past Medical History:   Diagnosis Date    Arthritis     Back pain     Carotid artery disease     Coronary artery disease     Depression     GERD (gastroesophageal reflux disease)     History of alcoholism     Hyperlipidemia     Hypertension     Kidney disease     was stage 4 but taking excessive amounts of ibuprofen, f/u with nephrologist at the time, resolved since and was to see nephrologist as needed     Neck pain     Spastic colon     Spinal stenosis     Stroke 2022    Vaginal itching     Wears dentures     Wears reading eyeglasses          Past Surgical History:   Procedure Laterality Date    CARDIAC CATHETERIZATION  2010    x2     CATARACT EXTRACTION Bilateral     CEREBRAL ANGIOGRAM N/A 12/19/2023    Procedure: Cerebral angiogram;  Surgeon: Carlos Faust MD;  Location:  [facility];  Service: Interventional Radiology;  Laterality: N/A;  alisia same day Dr. Concepcion is available. w/o anesthesia.     Subclavian cyst, subclavian steal    CHOLECYSTECTOMY      COLONOSCOPY  2016    CORONARY STENT PLACEMENT  2010    x3     ESOPHAGEAL DILATATION      HYSTERECTOMY      INTERVENTIONAL RADIOLOGY PROCEDURE N/A 11/03/2022    Procedure: IR angiogram vertebral cervical intracranial;  Surgeon: Carlos Faust MD;  Location:  [facility];  Service: Interventional Radiology;  Laterality: N/A;  w/ possible stenting    LUMBAR DISCECTOMY  07/10/2014    Rt- L1/2 Dr. Dionicio Pollard     LUMBAR LAMINECTOMY DISCECTOMY DECOMPRESSION N/A 10/17/2019    Procedure: LUMBAR LAMINECTOMY L3-5;  Surgeon: Dionicio Pollard MD;  Location:  [facility];  Service: Neurosurgery    OTHER SURGICAL HISTORY      CAROTID ANGIOGRAM 12/19/2023 PER DR. FAUST    TUBAL ABDOMINAL LIGATION      UPPER  GASTROINTESTINAL ENDOSCOPY           Family History   Problem Relation Age of Onset    Heart disease Mother     Emphysema Mother     Coronary artery disease Mother     Cancer Father     Lung cancer Father     Colon cancer Neg Hx          Social History     Socioeconomic History    Marital status:    Tobacco Use    Smoking status: Former     Current packs/day: 0.00     Average packs/day: 1.5 packs/day for 40.0 years (60.0 ttl pk-yrs)     Types: Cigarettes     Start date: 1960     Quit date: 2000     Years since quittin.6     Passive exposure: Past    Smokeless tobacco: Never   Vaping Use    Vaping status: Never Used   Substance and Sexual Activity    Alcohol use: No     Comment: hasn't used since , hx of alcoholism    Drug use: No    Sexual activity: Not Currently           Current Outpatient Medications:     acetaminophen (TYLENOL) 325 MG tablet, Take 2 tablets by mouth Every 4 (Four) Hours As Needed for Mild Pain or Moderate Pain., Disp: , Rfl:     amLODIPine (NORVASC) 5 MG tablet, Take 0.5 tablets by mouth 2 (Two) Times a Day., Disp: , Rfl:     aspirin 81 MG chewable tablet, Chew 1 tablet Daily., Disp: 30 tablet, Rfl: 1    atorvastatin (Lipitor) 80 MG tablet, Take 1 tablet by mouth Daily., Disp: 90 tablet, Rfl: 4    clobetasol prop emollient base (TEMOVATE) 0.05 % emollient cream, Apply a small amount to affected area twice daily for 6 to 8 weeks or until symptoms completely resolved.  Then apply 3 times weekly for maintenance., Disp: 30 g, Rfl: 2    clopidogrel (PLAVIX) 75 MG tablet, Take 1 tablet by mouth Daily., Disp: 30 tablet, Rfl: 1    Diclofenac Sodium (VOLTAREN) 1 % gel gel, Apply 4 g topically to the appropriate area as directed 4 (Four) Times a Day As Needed (pain)., Disp: 4 g, Rfl: 5    Esomeprazole Magnesium (NEXIUM PO), Take 75 mg by mouth Before Breakfast., Disp: , Rfl:     FLUoxetine (PROzac) 40 MG capsule, Take 1 capsule by mouth Daily., Disp: 90 capsule, Rfl: 1     glycopyrrolate (ROBINUL) 1 MG tablet, One tablet by mouth BID, Disp: , Rfl:     meclizine (ANTIVERT) 12.5 MG tablet, Take 1 tablet by mouth 3 (Three) Times a Day As Needed for Dizziness., Disp: 90 tablet, Rfl: 2      Allergies   Allergen Reactions    Penicillins Hives    Codeine Nausea And Vomiting    Imodium A-D [Loperamide Hcl] Nausea And Vomiting         There were no vitals taken for this visit.      Due to the constraints of the telemedicine format, no physical exam was performed     Physical Exam: (Previous PE from last office visit)  Constitutional: Patient is oriented to person, place, and time. Patient appears well-developed and well-nourished.   HENT: Head: Normocephalic and atraumatic. Eyes: Conjunctivae and lids are normal. Pupils: Equal, round, reactive to light.   Neck: Trachea normal. Neck supple. No JVD present.   Lymphatic: No cervical adenopathy  Pulmonary Respiratory effort: No increased work of breathing or signs of respiratory distress.   Musculoskeletal   Gait and station: Gait evaluation demonstrated a normal gait   Cervical spine: Passive and active range of motion are almost full and but reproduce pain. Flexion, extension and rotation of the cervical spine increased and reproduced pain. Cervical facet joint loading maneuvers are positive. Palpation of the left occipital and suboccipital region reproduces pain  Muscles: Presence of active trigger points at the bilateral levator scapulae and bilateral trapezius   Shoulders: The range of motion of the glenohumeral joints is full and without pain, there is pain with ROM on the left. Rotator cuff strength is 5/5.Neurological: Patient is alert and oriented to person, place, and time. Speech: speech is normal. Cortical function: Normal mental status.   Cranial nerves: Cranial nerves 2-12 intact.   Reflex Scores:  Right brachioradialis: 3+  Left brachioradialis: 3+  Right biceps: 3+  Left biceps: 3+  Right triceps: 3+  Left triceps: 3+  Motor  strength: 5/5  Motor Tone: normal tone.   Involuntary movements: none.   Superficial/Primitive Reflexes: primitive reflexes were absent.   Right Fox: absent  Left Fox: absent  Right ankle clonus: absent  Left ankle clonus: absent   Spurling sign is negative. Lhermitte sign is negative. Negative long tract signs.  Sensation: No sensory loss. Sensory exam: intact to light touch, intact pain and temperature sensation, intact vibration sensation and normal proprioception.   Coordination: Normal finger to nose and heel to shin. Normal balance and negative. Romberg's sign negative.   Skin and subcutaneous tissue: Skin is warm and intact. No rash noted. No cyanosis.   Psychiatric: Judgment and insight: Normal. Orientation to person, place and time: Normal. Recent and remote memory: Intact. Mood and affect: Normal.     ASSESSMENT:   1. Cervical spondylosis without myelopathy    2. Occipital neuralgia of left side    3. Myofascial pain    4. History of lumbar laminectomy    5. Lumbar postlaminectomy syndrome             PLAN/MEDICAL DECISION MAKING: Ms. Alysia Aguayo, 78 y.o. female has a longstanding history of chronic neck pain associated with occipital headaches.  MRI of the cervical spine without contrast performed in 2019, revealed multilevel disc degeneration with facet arthritis.  There are multiple levels that contain spinal canal as well as neural foraminal stenosis.  She has been evaluated by neurosurgery in the past for her neck pain, Dr. Dionicio Pollard, and was found not to be a surgical candidate.  Over the years, she has remarkably responded to interventional pain management measures as well as conservatism.  She continues with mechanical/axial neck pain, as well as predominantly left-sided occipital neuralgia.  She continues to deny any obvious radicular complaints, pain or numbness.  I had a lengthy conversation with Ms. Alysia Aguayo regarding her chronic pain condition and potential  therapeutic options including risks, benefits, alternative therapies, to name a few. Patient has failed to obtain pain relief with conservative measures, as referenced above. She continues to respond exceedingly well to interventional pain management procedures as referenced above.  I have reviewed all available patient's medical records as well as previous therapies as referenced above.  Therefore, I have proposed the following plan:  1. Interventional pain management measures: Due to patient's significant analgesic benefit following cervical facet joint injections performed on 6/9/2021, which provided her with 60 to 70% pain relief lasting over 6 months, we will proceed with bilateral cervical facet joint injections at C5-C6, C6-C7. If patient fails to obtain sustained pain relief, then, we will proceed with diagnostic bilateral cervical medial branch blocks at C4, C5, C6; for bilateral cervical facet joints at C5-C6, C6-C7 to clarify the origin of her unrelenting pain. If patient experiences more than 80% pain relief along with significant improvement in the range of motion of the cervical spine, then, patient will be scheduled for a second set of diagnostic bilateral cervical medial branch blocks, to then, proceed with cervical medial branch rhizotomies.  Otherwise, may consider left greater occipital nerve block by hydrodissection technique under ultrasound and fluoroscopic guidance combined with trigger point injections at the bilateral levator scapulae and bilateral trapezius to maximize her rehabilitation.   2. Diagnostics:  A. Depending on continued response, we may consider repeating MRI of the cervical spine without contrast.  3. Pharmacological measures. Reviewed and Discussed.   A. Tylenol. Patient also takes Prozac  B. Continue Voltaren gel  C. Continue tizanidine 2 mg take ½ tablet three times a day as needed for muscle spasms  D. Continue Rhuemate one capsule daily  4.  Long-term rehabilitation  efforts:  A. The patient does not have a history of falls. I did complete a risk assessment for falls.   B. Patient will start a comprehensive physical therapy program for water therapy, upper body strengthening/posture correction, gait and balance training, neurodynamics, myofascial release, cupping and dry needling   C. Start an exercise program such as yoga and water therapy  D. Use TENS unit on regular basis  E. Contrast therapy: Apply ice-packs for 15-20 minutes, followed by heating pads for 15-20 minutes to affected area   5. The patient has been instructed to contact my office with any questions or difficulties. The patient understands the plan and agrees to proceed accordingly.    Total time of discussion was 10 minutes.     Pain Medications               acetaminophen (TYLENOL) 325 MG tablet Take 2 tablets by mouth Every 4 (Four) Hours As Needed for Mild Pain or Moderate Pain.    aspirin 81 MG chewable tablet Chew 1 tablet Daily.    FLUoxetine (PROzac) 40 MG capsule Take 1 capsule by mouth Daily.               Patient Care Team:  Yesica Zhu PA-C as PCP - General  Dionicio Pollard MD as Consulting Physician (Neurosurgery)  Heriberto Lopes MD as Consulting Physician (Pain Medicine)  Alana Wang APRN as Nurse Practitioner (Pain Medicine)     No orders of the defined types were placed in this encounter.        Future Appointments   Date Time Provider Department Center   8/29/2024 10:15 AM Yesica Zhu PA-C [facility]   12/9/2024 11:00 AM [facility] C[facility] RM 2 [facility]   12/9/2024  1:45 PM Carlos Concepcion MD [facility]           [facility]

## 2024-08-23 ENCOUNTER — TELEPHONE (OUTPATIENT)
Dept: PAIN MEDICINE | Facility: CLINIC | Age: 79
End: 2024-08-23
Payer: MEDICARE

## 2024-08-23 NOTE — TELEPHONE ENCOUNTER
Caller: Alysia Aguayo    Relationship to patient: Self    Best call back number: [phone]    Chief complaint: CERVICAL     Type of visit: injection     Requested date: ASAP      Additional notes:WAS DISCUSSED 08/21/2024

## 2024-08-26 DIAGNOSIS — M47.812 CERVICAL SPONDYLOSIS WITHOUT MYELOPATHY: Primary | ICD-10-CM

## 2024-08-26 NOTE — TELEPHONE ENCOUNTER
Left VM for patient to call the office regarding message left on 8/23/2024.   [GA at Birth: ___] : GA at Birth: [unfilled] [Chronological Age: ___] : Chronological Age: [unfilled] [Corrected Age: ___] : Corrected Age: [unfilled] [Alert] : alert [Irritable] : irritable [Vocalizes] : vocalizes [Consolable] : consolable [Quiet] : quiet [Turns head to both sides (0-2 months)] : turns head to both sides (0-2 months) [Moves extremities equally] : moves extremities equally [Moves against gravity] : moves against gravity [Turns head side to side] : turns head side to side [Lifts head (45 deg 0-2 mon, 90 deg 1-3 mon)] : lifts head (45 degrees 0-2 months, 90 degrees 1-3 months) [Dissociates] : sidelying to supine (1.5 - 2 months) - Dissociates [Assist] : prone to supine (2- 5 months) - Assist [Lag] : Head lag (0-2 months) - lag [Fair] : head control is fair [>] : > [Organized] : organized [Good] : good [Focusing (2 months)] : focusing (2 months) [Tracking (2 months)] : tracking (2 months) [Visual attention] : visual attention [] : no [Sleeps well] : sleeps well [Prone] : prone [Developmental Suggestions] : developmental suggestions handout [FreeTextEntry2] : overall development [FreeTextEntry6] : decreased midline orientation [FreeTextEntry3] : Dev handout for prone, supported sitting, and reaching/swatting given to MOC c good understanding. No concerns at this time.

## 2024-08-26 NOTE — TELEPHONE ENCOUNTER
Hub staff attempted to follow warm transfer process and was unsuccessful     Caller: Alysia Aguayo    Relationship to patient: Self    Best call back number: [phone]    Patient is needing: RETURNING GIANNI'S CALL TO SCHEDULE PROCEDURE

## 2024-08-28 ENCOUNTER — OUTSIDE FACILITY SERVICE (OUTPATIENT)
Dept: PAIN MEDICINE | Facility: CLINIC | Age: 79
End: 2024-08-28
Payer: MEDICARE

## 2024-08-28 ENCOUNTER — DOCUMENTATION (OUTPATIENT)
Dept: PAIN MEDICINE | Facility: CLINIC | Age: 79
End: 2024-08-28

## 2024-08-28 PROCEDURE — 64490 INJ PARAVERT F JNT C/T 1 LEV: CPT | Performed by: ANESTHESIOLOGY

## 2024-08-28 PROCEDURE — 64491 INJ PARAVERT F JNT C/T 2 LEV: CPT | Performed by: ANESTHESIOLOGY

## 2024-08-28 NOTE — PROGRESS NOTES
[facility]      PROCEDURE DATE: 08/28/2024    PREOPERATIVE DIAGNOSES:  1. Cervical spondylosis without myelopathy   2. Occipital neuralgia of left side   3. Lumbar postlaminectomy syndrome   4. Myofascial pain   5. History of lumbar laminectomy for spinal cord decompression   6. History of coronary artery stent placement      POSTOPERATIVE DIAGNOSES:  1. Cervical spondylosis without myelopathy   2. Occipital neuralgia of left side   3. Lumbar postlaminectomy syndrome   4. Myofascial pain   5. History of lumbar laminectomy for spinal cord decompression   6. History of coronary artery stent placement      PROCEDURE: Therapeutic cervical facet joint injections: Bilateral C5-C6, bilateral C6-C7    ANESTHESIA: Local anesthesia plus IV sedation    COMPLICATIONS: None    EBL: 0     MEDICAL NECESSITY: A comprehensive evaluation including history and physical exam and pertinent physiologic and functional assessment was performed. The patient presents with intractable pain due to the diagnoses listed above. The patient has failed to respond to conservative modalities as referenced under HPI including the impact of patient's moderate-to-severe pain contributing to significant impairment in daily activities, ADLs, and a negative impact on quality of life. Supporting diagnostic studies of patient's chronic pain condition have been reviewed. We have discussed using a stepwise approach starting with the shortest or least intense level of treatment, care, or service as determined by the extent required to diagnose and or treat a patient's condition. The treatments proposed are consistent with the patient's medical condition and are known to be as safe and effective by current guidelines and standard of care. The duration and frequency proposed are considered appropriate for the service in accordance with accepted standards of medical practice for the diagnosis and or treatment of the patient's condition and or intended to improve the  patient's level of function. We have documented outcome in physical and functional status for repeating interventions only upon return of pain and or deterioration in functional status. Due to patient's significant analgesic benefit following cervical facet joint injections performed, we will proceed with bilateral cervical facet joint injections at C5-C6, C6-C7. See OV note for additional details.     PROCEDURE SUMMARY: After explaining all the risks and benefits of the procedure, an informed consent was obtained.  The patient's surgical site was   confirmed with the patient and marked in the holding room accordingly. The patient was transferred to the procedure room and placed on the operating room table in the prone position. Time out was completed. Noninvasive monitors were applied. Administration of IV sedation was performed by a designated procedure room nurse who monitored the patient's level of consciousness and physiological status. Pertinent information was reported on a sedation flow sheet, which is part of the patient's permanent medical records. Start sedation time: 10:57 AM. The patient's vital signs remained within normal limits. The cervical spine area was prepped and draped in the sterile fashion. Using C-arm fluoroscopic guidance, the cervical facet joints at bilateral C5-C6, bilateral C6-C7, (targets) were identified.  The skin and subcutaneous tissues overlying the targets were anesthetized with five ml of 1% lidocaine followed by eight ml of 0.25% bupivacaine. Then, 22-gauge styletted needles were advanced into the cervical facet joints without difficulty. X-rays were obtained confirming appropriate needle placement. Aspiration was negative for blood and/or CSF. Then, 0.5 ml of 0.25% bupivacaine with 10 mg of Depo-Medrol were injected per cervical facet joint. Fluoroscopy was utilized using low-dose of radiation applying collimation, pulsed mode, and shielding following ALARA recommendations.  Fluoroscopy time: 11 seconds. End sedation time: 11:03 AM. The patient tolerated the procedure well and without incident.  Upon completion of the procedure, the patient was transferred to the recovery room in stable condition. The patient was [facility] neurologically intact and with appropriate discharge instructions. The ROM of the cervical spine improved significantly and without pain. Pain level before procedure: 8/10. Pain level after procedure: 0/10.    PLAN:  Follow-up with [facility] in 20 weeks. See OV note.   Diagnostics Studies: See OV note.   Pharmacological measures: See OV note.  The patient has been instructed to contact my office with any questions or difficulties. The patient understands the plan and agrees to proceed accordingly.      Signatures  Electronically signed by: Heriberto Lopes M.D.; AUGUST 28, 2024, 11:33  AM EST (Author)

## 2024-08-29 ENCOUNTER — TELEPHONE (OUTPATIENT)
Dept: FAMILY MEDICINE CLINIC | Facility: CLINIC | Age: 79
End: 2024-08-29

## 2024-08-29 ENCOUNTER — OFFICE VISIT (OUTPATIENT)
Dept: FAMILY MEDICINE CLINIC | Facility: CLINIC | Age: 79
End: 2024-08-29
Payer: MEDICARE

## 2024-08-29 VITALS
WEIGHT: 155 LBS | BODY MASS INDEX: 27.46 KG/M2 | HEART RATE: 64 BPM | HEIGHT: 63 IN | OXYGEN SATURATION: 96 % | SYSTOLIC BLOOD PRESSURE: 118 MMHG | DIASTOLIC BLOOD PRESSURE: 62 MMHG

## 2024-08-29 DIAGNOSIS — Z78.0 ENCOUNTER FOR OSTEOPOROSIS SCREENING IN ASYMPTOMATIC POSTMENOPAUSAL PATIENT: ICD-10-CM

## 2024-08-29 DIAGNOSIS — E78.2 MIXED HYPERLIPIDEMIA: ICD-10-CM

## 2024-08-29 DIAGNOSIS — Z13.820 ENCOUNTER FOR OSTEOPOROSIS SCREENING IN ASYMPTOMATIC POSTMENOPAUSAL PATIENT: ICD-10-CM

## 2024-08-29 DIAGNOSIS — Z00.00 MEDICARE ANNUAL WELLNESS VISIT, SUBSEQUENT: Primary | ICD-10-CM

## 2024-08-29 DIAGNOSIS — I10 ESSENTIAL HYPERTENSION: ICD-10-CM

## 2024-08-29 DIAGNOSIS — F33.0 MAJOR DEPRESSIVE DISORDER, RECURRENT, MILD: ICD-10-CM

## 2024-08-29 PROCEDURE — 1125F AMNT PAIN NOTED PAIN PRSNT: CPT | Performed by: PHYSICIAN ASSISTANT

## 2024-08-29 PROCEDURE — 1170F FXNL STATUS ASSESSED: CPT | Performed by: PHYSICIAN ASSISTANT

## 2024-08-29 PROCEDURE — 3078F DIAST BP <80 MM HG: CPT | Performed by: PHYSICIAN ASSISTANT

## 2024-08-29 PROCEDURE — G0439 PPPS, SUBSEQ VISIT: HCPCS | Performed by: PHYSICIAN ASSISTANT

## 2024-08-29 PROCEDURE — 3074F SYST BP LT 130 MM HG: CPT | Performed by: PHYSICIAN ASSISTANT

## 2024-08-29 RX ORDER — FLUOXETINE 40 MG/1
40 CAPSULE ORAL DAILY
Qty: 30 CAPSULE | Refills: 0 | Status: SHIPPED | OUTPATIENT
Start: 2024-08-29 | End: 2024-08-29 | Stop reason: SDUPTHER

## 2024-08-29 RX ORDER — FLUOXETINE 40 MG/1
40 CAPSULE ORAL DAILY
Qty: 90 CAPSULE | Refills: 1 | Status: SHIPPED | OUTPATIENT
Start: 2024-08-29

## 2024-08-29 NOTE — TELEPHONE ENCOUNTER
I called pt let her know last colonoscopy was 8/12/2013 and they sent her a card to schedule her 10 year colonoscopy. Sending pt number so she can call and schedule.

## 2024-08-29 NOTE — ASSESSMENT & PLAN NOTE
Updated annual wellness visit checklist.  Immunizations discussed.  Discussed screenings.  Recommend yearly dental and eye exams. Also discussed monitoring of blood pressure and lipids. We addressed patient self-assessment of health status, frailty, and physical functioning. We reviewed psychosocial risks, behavioral risks, instrumental activities of daily living, and patient health risk assessment. Patient was given a personalized prevention plan.     She is going to check with her GI specialist to discuss repeat colonoscopy- was due last year.

## 2024-08-29 NOTE — PROGRESS NOTES
Subjective   The ABCs of the Annual Wellness Visit  Medicare Wellness Visit      Alysia Aguayo is a 78 y.o. patient who presents for a Medicare Wellness Visit.    Patient is also in today for medication recheck and refill.  She states the fluoxetine continues to work well for her depression symptoms and would like to continue at this time.  Denies any side effects of medication.  She would also like to return to clinic for fasting labs.  She states she has been feeling well.  Denies any chest pain or shortness of breath.  She declines mammogram.  She would like to get scheduled for a bone density scan.    The following portions of the patient's history were reviewed and   updated as appropriate: allergies, current medications, past family history, past medical history, past social history, past surgical history, and problem list.    Compared to one year ago, the patient's physical   health is the same.  Compared to one year ago, the patient's mental   health is the same.    Recent Hospitalizations:  She was admitted to the [facility] during the last year.     Current Medical Providers:  Patient Care Team:  Yesica Zhu PA-C as PCP - General  Dionicio Pollard MD as Consulting Physician (Neurosurgery)  Heriberto Lopes MD as Consulting Physician (Pain Medicine)  Alana Wang APRN as Nurse Practitioner (Pain Medicine)    Outpatient Medications Prior to Visit   Medication Sig Dispense Refill    acetaminophen (TYLENOL) 325 MG tablet Take 2 tablets by mouth Every 4 (Four) Hours As Needed for Mild Pain or Moderate Pain.      amLODIPine (NORVASC) 5 MG tablet Take 0.5 tablets by mouth 2 (Two) Times a Day.      aspirin 81 MG chewable tablet Chew 1 tablet Daily. 30 tablet 1    atorvastatin (Lipitor) 80 MG tablet Take 1 tablet by mouth Daily. 90 tablet 4    clobetasol prop emollient base (TEMOVATE) 0.05 % emollient cream Apply a small amount to affected area twice daily for 6 to 8 weeks or until  symptoms completely resolved.  Then apply 3 times weekly for maintenance. 30 g 2    clopidogrel (PLAVIX) 75 MG tablet Take 1 tablet by mouth Daily. 30 tablet 1    Diclofenac Sodium (VOLTAREN) 1 % gel gel Apply 4 g topically to the appropriate area as directed 4 (Four) Times a Day As Needed (pain). 4 g 5    Esomeprazole Magnesium (NEXIUM PO) Take 75 mg by mouth Before Breakfast.      glycopyrrolate (ROBINUL) 1 MG tablet One tablet by mouth BID      meclizine (ANTIVERT) 12.5 MG tablet Take 1 tablet by mouth 3 (Three) Times a Day As Needed for Dizziness. 90 tablet 2    FLUoxetine (PROzac) 40 MG capsule Take 1 capsule by mouth Daily. 90 capsule 1     No facility-administered medications prior to visit.     No opioid medication identified on active medication list. I have reviewed chart for other potential  high risk medication/s and harmful drug interactions in the elderly.      Aspirin is on active medication list. Aspirin use is indicated based on review of current medical condition/s. Pros and cons of this therapy have been discussed today. Benefits of this medication outweigh potential harm.  Patient has been encouraged to continue taking this medication.  .      Patient Active Problem List   Diagnosis    Cervical spondylosis without myelopathy    Myofascial pain    Frequent headaches    History of alcohol abuse    CAD s/p stent placement    History of lumbar laminectomy for spinal cord decompression    Occipital neuralgia of left side    Lumbar stenosis with neurogenic claudication    DDD (degenerative disc disease), lumbar    Anxiety and Depression    Left arm numbness    Essential hypertension    Mixed hyperlipidemia    Stroke-like symptoms    Cerebrovascular accident (CVA), unspecified mechanism    H/O: R MCA CVA (cerebrovascular accident)    Coronary arteriosclerosis in native artery    Osteoarthritis    History of hysterectomy    Encounter for screening mammogram for malignant neoplasm of breast    Medicare  "annual wellness visit, subsequent    Vaginal itching    Lichen sclerosus of female genitalia    Osteopenia, senile    Subclavian steal syndrome     Advance Care Planning Advance Directive is not on file.  ACP discussion was held with the patient during this visit. Patient does not have an advance directive, information provided.      Review of Systems   Constitutional:  Negative for fever.   HENT:  Negative for congestion and sore throat.    Respiratory:  Negative for cough and shortness of breath.    Cardiovascular:  Negative for chest pain.   Gastrointestinal:  Negative for abdominal pain, diarrhea, nausea and vomiting.   Genitourinary:  Negative for dysuria and frequency.   Neurological:  Negative for dizziness and headache.   Psychiatric/Behavioral:  Positive for depressed mood. The patient is not nervous/anxious.           Objective   Vitals:    08/29/24 1001   BP: 118/62   Pulse: 64   SpO2: 96%   Weight: 70.3 kg (155 lb)   Height: 160 cm (63\")       Estimated body mass index is 27.46 kg/m² as calculated from the following:    Height as of this encounter: 160 cm (63\").    Weight as of this encounter: 70.3 kg (155 lb).     Physical Exam  Constitutional:       Appearance: Normal appearance.   HENT:      Right Ear: Tympanic membrane normal.      Left Ear: Tympanic membrane normal.      Mouth/Throat:      Pharynx: Oropharynx is clear.   Eyes:      Conjunctiva/sclera: Conjunctivae normal.      Pupils: Pupils are equal, round, and reactive to light.   Cardiovascular:      Rate and Rhythm: Normal rate and regular rhythm.      Heart sounds: Normal heart sounds.   Pulmonary:      Effort: Pulmonary effort is normal.      Breath sounds: Normal breath sounds.   Abdominal:      Palpations: Abdomen is soft.      Tenderness: There is no abdominal tenderness.   Neurological:      Mental Status: She is oriented to person, place, and time.   Psychiatric:         Mood and Affect: Mood normal.         Behavior: Behavior normal. "             Does the patient have evidence of cognitive impairment? No                                                                                               Health  Risk Assessment    Smoking Status:  Social History     Tobacco Use   Smoking Status Former    Current packs/day: 0.00    Average packs/day: 1.5 packs/day for 40.0 years (60.0 ttl pk-yrs)    Types: Cigarettes    Start date: 1960    Quit date: 2000    Years since quittin.6    Passive exposure: Past   Smokeless Tobacco Never     Alcohol Consumption:  Social History     Substance and Sexual Activity   Alcohol Use No    Comment: hasn't used since , hx of alcoholism       Fall Risk Screen  STEADI Fall Risk Assessment was completed, and patient is at LOW risk for falls.Assessment completed on:2024    Depression Screenin/29/2024    10:00 AM   PHQ-2/PHQ-9 Depression Screening   Little Interest or Pleasure in Doing Things 0-->not at all   Feeling Down, Depressed or Hopeless 0-->not at all   PHQ-9: Brief Depression Severity Measure Score 0     Health Habits and Functional and Cognitive Screenin/29/2024    10:00 AM   Functional & Cognitive Status   Do you have difficulty preparing food and eating? No   Do you have difficulty bathing yourself, getting dressed or grooming yourself? No   Do you have difficulty using the toilet? No   Do you have difficulty moving around from place to place? No   Do you have trouble with steps or getting out of a bed or a chair? Yes   Current Diet Unhealthy Diet   Dental Exam Up to date   Eye Exam Not up to date   Exercise (times per week) 7 times per week   Current Exercises Include House Cleaning   Do you need help using the phone?  No   Are you deaf or do you have serious difficulty hearing?  No   Do you need help to go to places out of walking distance? Yes   Do you need help shopping? No   Do you need help preparing meals?  No   Do you need help with housework?  No   Do you need help  with laundry? No   Do you need help taking your medications? No   Do you need help managing money? No   Do you ever drive or ride in a car without wearing a seat belt? No   Have you felt unusual stress, anger or loneliness in the last month? Yes   Who do you live with? Child   If you need help, do you have trouble finding someone available to you? No   Have you been bothered in the last four weeks by sexual problems? No   Do you have difficulty concentrating, remembering or making decisions? No           Age-appropriate Screening Schedule:  Refer to the list below for future screening recommendations based on patient's age, sex and/or medical conditions. Orders for these recommended tests are listed in the plan section. The patient has been provided with a written plan.    Health Maintenance List  Health Maintenance   Topic Date Due    DXA SCAN  01/10/2020    COLORECTAL CANCER SCREENING  08/12/2023    LIPID PANEL  07/20/2024    INFLUENZA VACCINE  08/01/2024    ZOSTER VACCINE (1 of 2) 08/29/2024 (Originally 9/6/1996)    COVID-19 Vaccine (3 - 2023-24 season) 08/31/2024 (Originally 9/1/2023)    Pneumococcal Vaccine 65+ (1 of 1 - PCV) 11/02/2024 (Originally 9/6/2011)    RSV Vaccine - Adults (1 - 1-dose 60+ series) 02/08/2025 (Originally 9/6/2006)    MAMMOGRAM  07/20/2025 (Originally 7/20/2023)    TDAP/TD VACCINES (1 - Tdap) 08/29/2025 (Originally 9/6/1964)    BMI FOLLOWUP  01/15/2025    ANNUAL WELLNESS VISIT  08/29/2025    HEPATITIS C SCREENING  Completed                                                                                                                                                CMS Preventative Services Quick Reference  Risk Factors Identified During Encounter  Immunizations Discussed/Encouraged: Td, Prevnar 20 (Pneumococcal 20-valent conjugate), Shingrix, and RSV (Respiratory Syncytial Virus)  Vision Screening Recommended    The above risks/problems have been discussed with the patient.  Pertinent  information has been shared with the patient in the After Visit Summary.  An After Visit Summary and PPPS were made available to the patient.    Follow Up:   Next Medicare Wellness visit to be scheduled in 1 year.     Assessment & Plan  Medicare annual wellness visit, subsequent  Updated annual wellness visit checklist.  Immunizations discussed.  Discussed screenings.  Recommend yearly dental and eye exams. Also discussed monitoring of blood pressure and lipids. We addressed patient self-assessment of health status, frailty, and physical functioning. We reviewed psychosocial risks, behavioral risks, instrumental activities of daily living, and patient health risk assessment. Patient was given a personalized prevention plan.     She is going to check with her GI specialist to discuss repeat colonoscopy- was due last year.     Major depressive disorder, recurrent, mild  Refilled current dosage of fluoxetine- denies side effects. RTC prior to recheck as needed.   Essential hypertension  Continue current medication and f/up as directed.   Mixed hyperlipidemia   Will rtc for fasting labs; continue current medication and f/up as directed.   Encounter for osteoporosis screening in asymptomatic postmenopausal patient  Will put in DEXA scan order - she declines mammogram.     Orders Placed This Encounter   Procedures    DEXA Bone Density Axial    Comprehensive Metabolic Panel    Lipid Panel    TSH    CBC & Differential     New Medications Ordered This Visit   Medications    FLUoxetine (PROzac) 40 MG capsule     Sig: Take 1 capsule by mouth Daily.     Dispense:  90 capsule     Refill:  1          Follow Up:   No follow-ups on file.

## 2024-10-30 DIAGNOSIS — Z13.820 ENCOUNTER FOR OSTEOPOROSIS SCREENING IN ASYMPTOMATIC POSTMENOPAUSAL PATIENT: ICD-10-CM

## 2024-10-30 DIAGNOSIS — Z78.0 ENCOUNTER FOR OSTEOPOROSIS SCREENING IN ASYMPTOMATIC POSTMENOPAUSAL PATIENT: ICD-10-CM

## 2024-11-08 DIAGNOSIS — M85.80 OSTEOPENIA, UNSPECIFIED LOCATION: Primary | ICD-10-CM

## 2024-11-15 ENCOUNTER — TELEPHONE (OUTPATIENT)
Dept: PAIN MEDICINE | Facility: CLINIC | Age: 79
End: 2024-11-15
Payer: MEDICARE

## 2024-12-02 ENCOUNTER — OFFICE VISIT (OUTPATIENT)
Dept: FAMILY MEDICINE CLINIC | Facility: CLINIC | Age: 79
End: 2024-12-02
Payer: MEDICARE

## 2024-12-02 VITALS
BODY MASS INDEX: 27.46 KG/M2 | HEIGHT: 63 IN | HEART RATE: 75 BPM | DIASTOLIC BLOOD PRESSURE: 72 MMHG | TEMPERATURE: 98.5 F | SYSTOLIC BLOOD PRESSURE: 130 MMHG | OXYGEN SATURATION: 95 % | WEIGHT: 155 LBS

## 2024-12-02 DIAGNOSIS — J40 BRONCHITIS: Primary | ICD-10-CM

## 2024-12-02 DIAGNOSIS — M85.80 OSTEOPENIA, UNSPECIFIED LOCATION: ICD-10-CM

## 2024-12-02 DIAGNOSIS — I10 HYPERTENSION, ESSENTIAL: ICD-10-CM

## 2024-12-02 LAB
EXPIRATION DATE: NORMAL
FLUAV AG UPPER RESP QL IA.RAPID: NOT DETECTED
FLUBV AG UPPER RESP QL IA.RAPID: NOT DETECTED
INTERNAL CONTROL: NORMAL
Lab: NORMAL
SARS-COV-2 AG UPPER RESP QL IA.RAPID: NOT DETECTED

## 2024-12-02 PROCEDURE — 3075F SYST BP GE 130 - 139MM HG: CPT | Performed by: PHYSICIAN ASSISTANT

## 2024-12-02 PROCEDURE — 99213 OFFICE O/P EST LOW 20 MIN: CPT | Performed by: PHYSICIAN ASSISTANT

## 2024-12-02 PROCEDURE — 1125F AMNT PAIN NOTED PAIN PRSNT: CPT | Performed by: PHYSICIAN ASSISTANT

## 2024-12-02 PROCEDURE — 87428 SARSCOV & INF VIR A&B AG IA: CPT | Performed by: PHYSICIAN ASSISTANT

## 2024-12-02 PROCEDURE — 3078F DIAST BP <80 MM HG: CPT | Performed by: PHYSICIAN ASSISTANT

## 2024-12-02 RX ORDER — DOXYCYCLINE 100 MG/1
100 CAPSULE ORAL 2 TIMES DAILY
Qty: 20 CAPSULE | Refills: 0 | Status: SHIPPED | OUTPATIENT
Start: 2024-12-02

## 2024-12-02 NOTE — PROGRESS NOTES
"Chief Complaint  Cough (Coughing, sneezing, drainage going on since Friday )    Subjective          Alysia Aguayo presents to [facility]  History of Present Illness  Patient in today for evaluation on nasal congestion and cough for past 4-5 days. Denies fever. Denies nausea, vomiting or diarrhea. States gets bronchitis about once a year. States for past several months- bp has been fluctuating as well. Denies chest pain or shortness of breath. States saw cardiologist and had stress testing done around 2 months ago- is unsure of results. She also states a few times, when she has checked her bp over past few months , her cuff has said irregular heart beat.   Cough  This is a new problem. The current episode started in the past 7 days. Associated symptoms include nasal congestion. Pertinent negatives include no chest pain, chills, fever, sore throat, shortness of breath or wheezing.       Objective   Vital Signs:   /72   Pulse 75   Temp 98.5 °F (36.9 °C)   Ht 160 cm (63\")   Wt 70.3 kg (155 lb)   SpO2 95%   BMI 27.46 kg/m²     Body mass index is 27.46 kg/m².    Review of Systems   Constitutional:  Negative for chills and fever.   HENT:  Positive for congestion. Negative for sore throat.    Respiratory:  Positive for cough. Negative for shortness of breath and wheezing.    Cardiovascular:  Negative for chest pain.   Gastrointestinal:  Negative for abdominal pain, diarrhea, nausea and vomiting.   Genitourinary:  Negative for dysuria.   Neurological:  Negative for dizziness and headache.       Past History:  Medical History: has a past medical history of Arthritis, Back pain, Carotid artery disease, Coronary artery disease, Depression, GERD (gastroesophageal reflux disease), History of alcoholism, Hyperlipidemia, Hypertension, Kidney disease, Neck pain, Spastic colon, Spinal stenosis, Stroke (2022), Vaginal itching, Wears dentures, and Wears reading eyeglasses.   Surgical " History: has a past surgical history that includes Hysterectomy; Cholecystectomy; Tubal ligation; Lumbar discectomy (07/10/2014); Cataract extraction (Bilateral); Colonoscopy (2016); Esophageal dilation; Coronary stent placement (2010); Cardiac catheterization (2010); lumbar laminectomy discectomy decompression (N/A, 10/17/2019); Upper gastrointestinal endoscopy; Interventional radiology procedure (N/A, 11/03/2022); Other surgical history; and Cerebral angiogram (N/A, 12/19/2023).   Family History: family history includes Cancer in her father; Coronary artery disease in her mother; Emphysema in her mother; Heart disease in her mother; Lung cancer in her father.   Social History: reports that she quit smoking about 24 years ago. Her smoking use included cigarettes. She started smoking about 64 years ago. She has a 60 pack-year smoking history. She has been exposed to tobacco smoke. She has never used smokeless tobacco. She reports that she does not drink alcohol and does not use drugs.      Current Outpatient Medications:     acetaminophen (TYLENOL) 325 MG tablet, Take 2 tablets by mouth Every 4 (Four) Hours As Needed for Mild Pain or Moderate Pain., Disp: , Rfl:     amLODIPine (NORVASC) 5 MG tablet, Take 0.5 tablets by mouth 2 (Two) Times a Day., Disp: , Rfl:     aspirin 81 MG chewable tablet, Chew 1 tablet Daily., Disp: 30 tablet, Rfl: 1    atorvastatin (Lipitor) 80 MG tablet, Take 1 tablet by mouth Daily., Disp: 90 tablet, Rfl: 4    clobetasol prop emollient base (TEMOVATE) 0.05 % emollient cream, Apply a small amount to affected area twice daily for 6 to 8 weeks or until symptoms completely resolved.  Then apply 3 times weekly for maintenance., Disp: 30 g, Rfl: 2    clopidogrel (PLAVIX) 75 MG tablet, Take 1 tablet by mouth Daily., Disp: 30 tablet, Rfl: 1    Diclofenac Sodium (VOLTAREN) 1 % gel gel, Apply 4 g topically to the appropriate area as directed 4 (Four) Times a Day As Needed (pain)., Disp: 4 g, Rfl: 5     Esomeprazole Magnesium (NEXIUM PO), Take 75 mg by mouth Before Breakfast., Disp: , Rfl:     FLUoxetine (PROzac) 40 MG capsule, Take 1 capsule by mouth Daily., Disp: 90 capsule, Rfl: 1    glycopyrrolate (ROBINUL) 1 MG tablet, One tablet by mouth BID, Disp: , Rfl:     meclizine (ANTIVERT) 12.5 MG tablet, Take 1 tablet by mouth 3 (Three) Times a Day As Needed for Dizziness., Disp: 90 tablet, Rfl: 2    doxycycline (VIBRAMYCIN) 100 MG capsule, Take 1 capsule by mouth 2 (Two) Times a Day., Disp: 20 capsule, Rfl: 0  Allergies: Penicillins, Codeine, and Imodium a-d [loperamide hcl]    Physical Exam  Constitutional:       Appearance: Normal appearance.   HENT:      Right Ear: Tympanic membrane normal.      Left Ear: Tympanic membrane normal.      Mouth/Throat:      Pharynx: Oropharynx is clear.   Eyes:      Conjunctiva/sclera: Conjunctivae normal.      Pupils: Pupils are equal, round, and reactive to light.   Cardiovascular:      Rate and Rhythm: Normal rate and regular rhythm.      Heart sounds: Normal heart sounds.   Pulmonary:      Effort: Pulmonary effort is normal.      Comments: Faint loose congestion noted upon auscultation  Musculoskeletal:      Right lower leg: No edema.      Left lower leg: No edema.   Neurological:      Mental Status: She is oriented to person, place, and time.   Psychiatric:         Mood and Affect: Mood normal.         Behavior: Behavior normal.             Assessment and Plan   Diagnoses and all orders for this visit:    1. Bronchitis (Primary)  -     Covid-19 + Flu A&B AG, Veritor  Rapid covid and flu testing negative; recommend good hydration and expectorant as needed and with persistent symptoms, will start on antibiotic- discussed possible side effects; monitor symptoms closely and rtc if not improving  2. Hypertension, essential  Recommend patient to touch base with her cardiologist to f/up on recent cardiac studies but also to let him know about fluctuating blood pressure and possible  irregular heartbeat-- denies any symptoms at this time-- she states will contact his office and f/up-- rtc or to ER for any acute symptoms prior if needed   Other orders  -     doxycycline (VIBRAMYCIN) 100 MG capsule; Take 1 capsule by mouth 2 (Two) Times a Day.  Dispense: 20 capsule; Refill: 0            Follow Up   No follow-ups on file.  Patient was given instructions and counseling regarding her condition or for health maintenance advice. Please see specific information pulled into the AVS if appropriate.     Yesica Zhu PA-C

## 2024-12-03 DIAGNOSIS — R71.8 ELEVATED MCV: Primary | ICD-10-CM

## 2024-12-03 LAB
25(OH)D3+25(OH)D2 SERPL-MCNC: 13.1 NG/ML (ref 30–100)
ALBUMIN SERPL-MCNC: 4 G/DL (ref 3.8–4.8)
ALP SERPL-CCNC: 100 IU/L (ref 44–121)
ALT SERPL-CCNC: 9 IU/L (ref 0–32)
AST SERPL-CCNC: 19 IU/L (ref 0–40)
BASOPHILS # BLD AUTO: 0 X10E3/UL (ref 0–0.2)
BASOPHILS NFR BLD AUTO: 1 %
BILIRUB SERPL-MCNC: 0.3 MG/DL (ref 0–1.2)
BUN SERPL-MCNC: 13 MG/DL (ref 8–27)
BUN/CREAT SERPL: 13 (ref 12–28)
CALCIUM SERPL-MCNC: 9 MG/DL (ref 8.7–10.3)
CHLORIDE SERPL-SCNC: 104 MMOL/L (ref 96–106)
CHOLEST SERPL-MCNC: 134 MG/DL (ref 100–199)
CO2 SERPL-SCNC: 19 MMOL/L (ref 20–29)
CREAT SERPL-MCNC: 1.01 MG/DL (ref 0.57–1)
EGFRCR SERPLBLD CKD-EPI 2021: 57 ML/MIN/1.73
EOSINOPHIL # BLD AUTO: 0.2 X10E3/UL (ref 0–0.4)
EOSINOPHIL NFR BLD AUTO: 2 %
ERYTHROCYTE [DISTWIDTH] IN BLOOD BY AUTOMATED COUNT: 12.6 % (ref 11.7–15.4)
GLOBULIN SER CALC-MCNC: 2.3 G/DL (ref 1.5–4.5)
GLUCOSE SERPL-MCNC: 92 MG/DL (ref 70–99)
HCT VFR BLD AUTO: 37.1 % (ref 34–46.6)
HDLC SERPL-MCNC: 55 MG/DL
HGB BLD-MCNC: 12 G/DL (ref 11.1–15.9)
IMM GRANULOCYTES # BLD AUTO: 0 X10E3/UL (ref 0–0.1)
IMM GRANULOCYTES NFR BLD AUTO: 0 %
LDLC SERPL CALC-MCNC: 58 MG/DL (ref 0–99)
LYMPHOCYTES # BLD AUTO: 1.3 X10E3/UL (ref 0.7–3.1)
LYMPHOCYTES NFR BLD AUTO: 19 %
MCH RBC QN AUTO: 32.6 PG (ref 26.6–33)
MCHC RBC AUTO-ENTMCNC: 32.3 G/DL (ref 31.5–35.7)
MCV RBC AUTO: 101 FL (ref 79–97)
MONOCYTES # BLD AUTO: 0.7 X10E3/UL (ref 0.1–0.9)
MONOCYTES NFR BLD AUTO: 10 %
NEUTROPHILS # BLD AUTO: 4.8 X10E3/UL (ref 1.4–7)
NEUTROPHILS NFR BLD AUTO: 68 %
PLATELET # BLD AUTO: 252 X10E3/UL (ref 150–450)
POTASSIUM SERPL-SCNC: 3.8 MMOL/L (ref 3.5–5.2)
PROT SERPL-MCNC: 6.3 G/DL (ref 6–8.5)
RBC # BLD AUTO: 3.68 X10E6/UL (ref 3.77–5.28)
SODIUM SERPL-SCNC: 138 MMOL/L (ref 134–144)
TRIGL SERPL-MCNC: 117 MG/DL (ref 0–149)
TSH SERPL DL<=0.005 MIU/L-ACNC: 2.74 UIU/ML (ref 0.45–4.5)
VLDLC SERPL CALC-MCNC: 21 MG/DL (ref 5–40)
WBC # BLD AUTO: 6.9 X10E3/UL (ref 3.4–10.8)

## 2024-12-05 ENCOUNTER — TELEPHONE (OUTPATIENT)
Dept: FAMILY MEDICINE CLINIC | Facility: CLINIC | Age: 79
End: 2024-12-05
Payer: MEDICARE

## 2024-12-05 NOTE — TELEPHONE ENCOUNTER
Caller: Alysia Aguayo    Relationship: Self    Best call back number: [phone]     What is the best time to reach you: ANYTIME    Who are you requesting to speak with (clinical staff, provider,  specific staff member): CLINICAL STAFF    What was the call regarding: PATIENT WOULD LIKE TO KNOW IF SHE CAN STILL TAKE METAMUCIL ON HER ANTIBIOTICS?

## 2024-12-09 ENCOUNTER — OFFICE VISIT (OUTPATIENT)
Dept: NEUROSURGERY | Facility: CLINIC | Age: 79
End: 2024-12-09
Payer: MEDICARE

## 2024-12-09 ENCOUNTER — HOSPITAL ENCOUNTER (OUTPATIENT)
Dept: CARDIOLOGY | Facility: HOSPITAL | Age: 79
Discharge: HOME | End: 2024-12-09
Admitting: NEUROLOGICAL SURGERY
Payer: MEDICARE

## 2024-12-09 VITALS — HEIGHT: 63 IN | WEIGHT: 151 LBS | BODY MASS INDEX: 26.75 KG/M2 | TEMPERATURE: 98.1 F

## 2024-12-09 DIAGNOSIS — I77.1 INNOMINATE ARTERY STENOSIS: ICD-10-CM

## 2024-12-09 DIAGNOSIS — G45.8 SUBCLAVIAN STEAL SYNDROME: Primary | ICD-10-CM

## 2024-12-09 LAB
BH CV XLRA MEAS LEFT DIST CCA EDV: 14.5 CM/SEC
BH CV XLRA MEAS LEFT DIST CCA PSV: 59.4 CM/SEC
BH CV XLRA MEAS LEFT DIST ICA EDV: 35.8 CM/SEC
BH CV XLRA MEAS LEFT DIST ICA PSV: 113.8 CM/SEC
BH CV XLRA MEAS LEFT ICA/CCA RATIO: 1.79
BH CV XLRA MEAS LEFT MID CCA EDV: 12.4 CM/SEC
BH CV XLRA MEAS LEFT MID CCA PSV: 72.8 CM/SEC
BH CV XLRA MEAS LEFT MID ICA EDV: 37 CM/SEC
BH CV XLRA MEAS LEFT MID ICA PSV: 129.2 CM/SEC
BH CV XLRA MEAS LEFT PROX CCA EDV: 10.3 CM/SEC
BH CV XLRA MEAS LEFT PROX CCA PSV: 65 CM/SEC
BH CV XLRA MEAS LEFT PROX ECA EDV: 8.4 CM/SEC
BH CV XLRA MEAS LEFT PROX ECA PSV: 79 CM/SEC
BH CV XLRA MEAS LEFT PROX ICA EDV: 18.2 CM/SEC
BH CV XLRA MEAS LEFT PROX ICA PSV: 66.2 CM/SEC
BH CV XLRA MEAS LEFT PROX SCLA PSV: 116.3 CM/SEC
BH CV XLRA MEAS LEFT VERTEBRAL A EDV: 27.7 CM/SEC
BH CV XLRA MEAS LEFT VERTEBRAL A PSV: 123.5 CM/SEC
BH CV XLRA MEAS RIGHT DIST CCA EDV: 11 CM/SEC
BH CV XLRA MEAS RIGHT DIST CCA PSV: 63.6 CM/SEC
BH CV XLRA MEAS RIGHT DIST ICA EDV: 14.9 CM/SEC
BH CV XLRA MEAS RIGHT DIST ICA PSV: 68.8 CM/SEC
BH CV XLRA MEAS RIGHT ICA/CCA RATIO: 0.93
BH CV XLRA MEAS RIGHT MID CCA EDV: 12 CM/SEC
BH CV XLRA MEAS RIGHT MID CCA PSV: 73.7 CM/SEC
BH CV XLRA MEAS RIGHT MID ICA EDV: 17.7 CM/SEC
BH CV XLRA MEAS RIGHT MID ICA PSV: 65.6 CM/SEC
BH CV XLRA MEAS RIGHT PROX CCA EDV: 16.2 CM/SEC
BH CV XLRA MEAS RIGHT PROX CCA PSV: 99.6 CM/SEC
BH CV XLRA MEAS RIGHT PROX ECA EDV: 14.3 CM/SEC
BH CV XLRA MEAS RIGHT PROX ECA PSV: 103.9 CM/SEC
BH CV XLRA MEAS RIGHT PROX ICA EDV: 19 CM/SEC
BH CV XLRA MEAS RIGHT PROX ICA PSV: 63.5 CM/SEC
BH CV XLRA MEAS RIGHT PROX SCLA PSV: 152.3 CM/SEC
BH CV XLRA MEAS RIGHT VERTEBRAL A EDV: 11.4 CM/SEC
BH CV XLRA MEAS RIGHT VERTEBRAL A PSV: 40.9 CM/SEC
LEFT ARM BP: NORMAL MMHG
RIGHT ARM BP: NORMAL MMHG

## 2024-12-09 PROCEDURE — 93880 EXTRACRANIAL BILAT STUDY: CPT

## 2024-12-09 PROCEDURE — 99213 OFFICE O/P EST LOW 20 MIN: CPT | Performed by: NEUROLOGICAL SURGERY

## 2024-12-09 NOTE — PROGRESS NOTES
"Subjective     Chief Complaint: Follow-up ultrasound    Patient ID: Alysia Aguayo is a 79 y.o. female is here today for follow-up.    History of Present Illness    This is a 79-year-old woman in whom I placed an innominate artery stent in 2022, and a second stent in 2023.  She presents today to discuss the most recent ultrasound results.    The following portions of the patient's history were reviewed and updated as appropriate: allergies, current medications, past family history, past medical history, past social history, past surgical history and problem list.    Family history:   Family History   Problem Relation Age of Onset    Heart disease Mother     Emphysema Mother     Coronary artery disease Mother     Cancer Father     Lung cancer Father     Colon cancer Neg Hx        Social history:   Social History     Socioeconomic History    Marital status:    Tobacco Use    Smoking status: Former     Current packs/day: 0.00     Average packs/day: 1.5 packs/day for 40.0 years (60.0 ttl pk-yrs)     Types: Cigarettes     Start date: 1960     Quit date: 2000     Years since quittin.9     Passive exposure: Past    Smokeless tobacco: Never   Vaping Use    Vaping status: Never Used   Substance and Sexual Activity    Alcohol use: No     Comment: hasn't used since , hx of alcoholism    Drug use: No    Sexual activity: Not Currently       Review of Systems    Objective   Temperature 98.1 °F (36.7 °C), temperature source Temporal, height 160 cm (63\"), weight 68.5 kg (151 lb), not currently breastfeeding.  Body mass index is 26.75 kg/m².    Physical Exam  Constitutional:       General: She is not in acute distress.     Appearance: She is well-developed. She is not diaphoretic.   HENT:      Head: Normocephalic and atraumatic.   Pulmonary:      Effort: Pulmonary effort is normal.   Skin:     General: Skin is warm and dry.   Neurological:      Mental Status: She is alert and oriented to " person, place, and time.      Cranial Nerves: No cranial nerve deficit.      Comments: Alert, pleasant, conversant, and appropriate.         Assessment & Plan     Independent Review of Radiographic Studies:      Available for my review is a carotid ultrasound which was performed earlier today.  A comparison study from 12/4/2023, which was prior to her second stent placement, was also reviewed.  There is anterograde flow through both vertebral arteries.  There is minimal carotid stenosis.    Medical Decision Making:      This is a 79-year-old woman who presents today to discuss the results of her most recent surveillance carotid ultrasound.  She has no radiographic evidence of recurrent stenosis, carotid stenosis, or subclavian steal.  As such, she will require a surveillance ultrasound in 1 year, or sooner if clinically indicated.  At this point, if she needs to temporarily come off her Plavix for any reason that is probably safe.  I did ask her to coordinate it through this office.  I will follow-up with her after her next carotid ultrasound.    Diagnoses and all orders for this visit:    1. Subclavian steal syndrome (Primary)    2. Innominate artery stenosis        No follow-ups on file.           This document signed by MIR Concepcion MD December 9, 2024 13:55 EST

## 2024-12-13 ENCOUNTER — TELEPHONE (OUTPATIENT)
Dept: FAMILY MEDICINE CLINIC | Facility: CLINIC | Age: 79
End: 2024-12-13

## 2024-12-13 ENCOUNTER — OFFICE VISIT (OUTPATIENT)
Dept: FAMILY MEDICINE CLINIC | Facility: CLINIC | Age: 79
End: 2024-12-13
Payer: MEDICARE

## 2024-12-13 VITALS
DIASTOLIC BLOOD PRESSURE: 70 MMHG | OXYGEN SATURATION: 95 % | WEIGHT: 156 LBS | BODY MASS INDEX: 27.64 KG/M2 | HEART RATE: 68 BPM | SYSTOLIC BLOOD PRESSURE: 140 MMHG | TEMPERATURE: 98 F | HEIGHT: 63 IN | RESPIRATION RATE: 18 BRPM

## 2024-12-13 DIAGNOSIS — J18.9 PNEUMONIA OF RIGHT LOWER LOBE DUE TO INFECTIOUS ORGANISM: Primary | ICD-10-CM

## 2024-12-13 DIAGNOSIS — R05.1 ACUTE COUGH: ICD-10-CM

## 2024-12-13 PROCEDURE — 3078F DIAST BP <80 MM HG: CPT | Performed by: NURSE PRACTITIONER

## 2024-12-13 PROCEDURE — 1159F MED LIST DOCD IN RCRD: CPT | Performed by: NURSE PRACTITIONER

## 2024-12-13 PROCEDURE — 1125F AMNT PAIN NOTED PAIN PRSNT: CPT | Performed by: NURSE PRACTITIONER

## 2024-12-13 PROCEDURE — 1160F RVW MEDS BY RX/DR IN RCRD: CPT | Performed by: NURSE PRACTITIONER

## 2024-12-13 PROCEDURE — 3077F SYST BP >= 140 MM HG: CPT | Performed by: NURSE PRACTITIONER

## 2024-12-13 PROCEDURE — 99214 OFFICE O/P EST MOD 30 MIN: CPT | Performed by: NURSE PRACTITIONER

## 2024-12-13 RX ORDER — PREDNISONE 20 MG/1
TABLET ORAL
Qty: 18 TABLET | Refills: 0 | Status: SHIPPED | OUTPATIENT
Start: 2024-12-13

## 2024-12-13 RX ORDER — LEVOFLOXACIN 500 MG/1
500 TABLET, FILM COATED ORAL DAILY
Qty: 10 TABLET | Refills: 0 | Status: SHIPPED | OUTPATIENT
Start: 2024-12-13 | End: 2024-12-23

## 2024-12-13 RX ORDER — DEXTROMETHORPHAN HYDROBROMIDE AND PROMETHAZINE HYDROCHLORIDE 15; 6.25 MG/5ML; MG/5ML
5 SYRUP ORAL 4 TIMES DAILY PRN
Qty: 120 ML | Refills: 0 | Status: SHIPPED | OUTPATIENT
Start: 2024-12-13

## 2024-12-13 NOTE — PROGRESS NOTES
"Chief Complaint  Cough (Worse when pt lays down )    Subjective          Alysia Aguayo presents to [facility]  History of Present Illness  Pt has had cough and congestion x 2-3 weeks. She saw Chela on 12/2 and was given Cefdinir. She stopped this for a few days due to nausea but restarted and has a few days left. She now has back pain at times and her cough has worsened.   Cough  Associated symptoms include shortness of breath and wheezing. Pertinent negatives include no chills or fever.       History of Present Illness      Objective   Vital Signs:   /70 (BP Location: Left arm, Patient Position: Sitting, Cuff Size: Adult)   Pulse 68   Temp 98 °F (36.7 °C) (Oral)   Resp 18   Ht 160 cm (63\")   Wt 70.8 kg (156 lb)   SpO2 95%   BMI 27.63 kg/m²     Body mass index is 27.63 kg/m².    Review of Systems   Constitutional:  Negative for chills and fever.   HENT:  Positive for congestion.    Respiratory:  Positive for cough, shortness of breath and wheezing.           Current Outpatient Medications:     acetaminophen (TYLENOL) 325 MG tablet, Take 2 tablets by mouth Every 4 (Four) Hours As Needed for Mild Pain or Moderate Pain., Disp: , Rfl:     amLODIPine (NORVASC) 5 MG tablet, Take 0.5 tablets by mouth 2 (Two) Times a Day., Disp: , Rfl:     aspirin 81 MG chewable tablet, Chew 1 tablet Daily., Disp: 30 tablet, Rfl: 1    atorvastatin (Lipitor) 80 MG tablet, Take 1 tablet by mouth Daily., Disp: 90 tablet, Rfl: 4    clobetasol prop emollient base (TEMOVATE) 0.05 % emollient cream, Apply a small amount to affected area twice daily for 6 to 8 weeks or until symptoms completely resolved.  Then apply 3 times weekly for maintenance., Disp: 30 g, Rfl: 2    clopidogrel (PLAVIX) 75 MG tablet, Take 1 tablet by mouth Daily., Disp: 30 tablet, Rfl: 1    Diclofenac Sodium (VOLTAREN) 1 % gel gel, Apply 4 g topically to the appropriate area as directed 4 (Four) Times a Day As Needed (pain)., " Disp: 4 g, Rfl: 5    doxycycline (VIBRAMYCIN) 100 MG capsule, Take 1 capsule by mouth 2 (Two) Times a Day., Disp: 20 capsule, Rfl: 0    Esomeprazole Magnesium (NEXIUM PO), Take 75 mg by mouth Before Breakfast., Disp: , Rfl:     FLUoxetine (PROzac) 40 MG capsule, Take 1 capsule by mouth Daily., Disp: 90 capsule, Rfl: 1    meclizine (ANTIVERT) 12.5 MG tablet, Take 1 tablet by mouth 3 (Three) Times a Day As Needed for Dizziness., Disp: 90 tablet, Rfl: 2    glycopyrrolate (ROBINUL) 1 MG tablet, One tablet by mouth BID (Patient not taking: Reported on 12/13/2024), Disp: , Rfl:     levoFLOXacin (LEVAQUIN) 500 MG tablet, Take 1 tablet by mouth Daily for 10 days., Disp: 10 tablet, Rfl: 0    predniSONE (DELTASONE) 20 MG tablet, 3 QD x 3 days, 2 QD x 3 days, 1 QD x 3 days, Disp: 18 tablet, Rfl: 0    promethazine-dextromethorphan (PROMETHAZINE-DM) 6.25-15 MG/5ML syrup, Take 5 mL by mouth 4 (Four) Times a Day As Needed for Cough., Disp: 120 mL, Rfl: 0      Allergies: Penicillins, Codeine, and Imodium a-d [loperamide hcl]    Physical Exam  Constitutional:       Appearance: Normal appearance.   HENT:      Head: Normocephalic.   Eyes:      Conjunctiva/sclera: Conjunctivae normal.      Pupils: Pupils are equal, round, and reactive to light.   Cardiovascular:      Rate and Rhythm: Normal rate and regular rhythm.      Heart sounds: Normal heart sounds.   Pulmonary:      Effort: Pulmonary effort is normal.      Breath sounds: Wheezing present.      Comments: Crackles right base  Abdominal:      Tenderness: There is no abdominal tenderness.   Musculoskeletal:         General: Normal range of motion.   Skin:     General: Skin is warm and dry.      Capillary Refill: Capillary refill takes less than 2 seconds.   Neurological:      General: No focal deficit present.      Mental Status: She is alert and oriented to person, place, and time.   Psychiatric:         Mood and Affect: Mood normal.         Behavior: Behavior normal.          Thought Content: Thought content normal.         Judgment: Judgment normal.          Physical Exam         Result Review :                Results            Assessment and Plan    Diagnoses and all orders for this visit:    1. Pneumonia of right lower lobe due to infectious organism (Primary)  Comments:  Change to Levaquin. Finish prednisone and take Mucinex BID. Increase fluids. RTC or go to ER for worsened sx. RTC if not improving in 1 week.  Orders:  -     predniSONE (DELTASONE) 20 MG tablet; 3 QD x 3 days, 2 QD x 3 days, 1 QD x 3 days  Dispense: 18 tablet; Refill: 0  -     levoFLOXacin (LEVAQUIN) 500 MG tablet; Take 1 tablet by mouth Daily for 10 days.  Dispense: 10 tablet; Refill: 0  -     promethazine-dextromethorphan (PROMETHAZINE-DM) 6.25-15 MG/5ML syrup; Take 5 mL by mouth 4 (Four) Times a Day As Needed for Cough.  Dispense: 120 mL; Refill: 0    2. Acute cough  -     Cancel: XR Chest PA & Lateral; Future  -     XR Chest PA & Lateral; Future                  Follow Up   Return in about 1 week (around 12/20/2024) for if not improving or sooner if symptoms worsen.  Patient was given instructions and counseling regarding her condition or for health maintenance advice. Please see specific information pulled into the AVS if appropriate.     HUE Zacarias

## 2024-12-13 NOTE — TELEPHONE ENCOUNTER
She was seen on 12/2. Can you give her something for her cough or does she need to be re-evaluated?

## 2024-12-13 NOTE — TELEPHONE ENCOUNTER
Caller: Alysia Aguayo    Relationship: Self    Best call back number: [phone]     What medication are you requesting: COUGH MEDICINE    What are your current symptoms: DEEP COUGH    How long have you been experiencing symptoms: 2 WEEKS    Have you had these symptoms before:    [x] Yes  [] No    Have you been treated for these symptoms before:   [x] Yes  [] No    If a prescription is needed, what is your preferred pharmacy and phone number: [facility] [address] [place] [address] [phone] [facility] [phone]      Additional notes: PT IS ASKING FOR SOMETHING FOR A DEEP, PERSISTENT COUGH. PT STATED SHE HAS HIGH BLOOD PRESSURE. PLEASE LET PT KNOW WHEN SOMETHING IS SENT.

## 2024-12-30 ENCOUNTER — OFFICE VISIT (OUTPATIENT)
Dept: FAMILY MEDICINE CLINIC | Facility: CLINIC | Age: 79
End: 2024-12-30
Payer: MEDICARE

## 2024-12-30 VITALS
HEART RATE: 77 BPM | BODY MASS INDEX: 27.29 KG/M2 | OXYGEN SATURATION: 97 % | WEIGHT: 154 LBS | DIASTOLIC BLOOD PRESSURE: 80 MMHG | HEIGHT: 63 IN | SYSTOLIC BLOOD PRESSURE: 180 MMHG

## 2024-12-30 DIAGNOSIS — R53.83 OTHER FATIGUE: Primary | ICD-10-CM

## 2024-12-30 DIAGNOSIS — J18.9 PNEUMONIA OF RIGHT LOWER LOBE DUE TO INFECTIOUS ORGANISM: ICD-10-CM

## 2024-12-30 PROCEDURE — 1159F MED LIST DOCD IN RCRD: CPT | Performed by: NURSE PRACTITIONER

## 2024-12-30 PROCEDURE — 99214 OFFICE O/P EST MOD 30 MIN: CPT | Performed by: NURSE PRACTITIONER

## 2024-12-30 PROCEDURE — 1125F AMNT PAIN NOTED PAIN PRSNT: CPT | Performed by: NURSE PRACTITIONER

## 2024-12-30 PROCEDURE — 3077F SYST BP >= 140 MM HG: CPT | Performed by: NURSE PRACTITIONER

## 2024-12-30 PROCEDURE — 3079F DIAST BP 80-89 MM HG: CPT | Performed by: NURSE PRACTITIONER

## 2024-12-30 PROCEDURE — 1160F RVW MEDS BY RX/DR IN RCRD: CPT | Performed by: NURSE PRACTITIONER

## 2024-12-30 RX ORDER — AZITHROMYCIN 250 MG/1
TABLET, FILM COATED ORAL
Qty: 6 TABLET | Refills: 0 | Status: SHIPPED | OUTPATIENT
Start: 2024-12-30 | End: 2025-01-03

## 2024-12-30 RX ORDER — DEXTROMETHORPHAN HYDROBROMIDE AND PROMETHAZINE HYDROCHLORIDE 15; 6.25 MG/5ML; MG/5ML
5 SYRUP ORAL 4 TIMES DAILY PRN
Qty: 200 ML | Refills: 0 | Status: SHIPPED | OUTPATIENT
Start: 2024-12-30

## 2024-12-30 NOTE — PROGRESS NOTES
"Chief Complaint  Cough    Subjective          Alysia Aguayo presents to [facility]  History of Present Illness  Pt has had cough and congestion x 1 month. She was given Doxycycline which she finished in two separate settings. She did not take the Levaquin for fear of tendon pain. She continues to have cough and fatigue.       History of Present Illness      Objective   Vital Signs:   /80   Pulse 77   Ht 160 cm (63\")   Wt 69.9 kg (154 lb)   SpO2 97%   BMI 27.28 kg/m²     Body mass index is 27.28 kg/m².    Review of Systems   Constitutional:  Positive for fatigue. Negative for fever.   HENT:  Positive for congestion.    Respiratory:  Positive for cough. Negative for shortness of breath.    Cardiovascular:  Negative for chest pain, palpitations and leg swelling.   Neurological:  Negative for syncope.   Psychiatric/Behavioral:  The patient is not nervous/anxious.           Current Outpatient Medications:     promethazine-dextromethorphan (PROMETHAZINE-DM) 6.25-15 MG/5ML syrup, Take 5 mL by mouth 4 (Four) Times a Day As Needed for Cough., Disp: 200 mL, Rfl: 0    acetaminophen (TYLENOL) 325 MG tablet, Take 2 tablets by mouth Every 4 (Four) Hours As Needed for Mild Pain or Moderate Pain., Disp: , Rfl:     amLODIPine (NORVASC) 5 MG tablet, Take 0.5 tablets by mouth 2 (Two) Times a Day., Disp: , Rfl:     aspirin 81 MG chewable tablet, Chew 1 tablet Daily., Disp: 30 tablet, Rfl: 1    atorvastatin (Lipitor) 80 MG tablet, Take 1 tablet by mouth Daily., Disp: 90 tablet, Rfl: 4    azithromycin (Zithromax Z-Reuben) 250 MG tablet, Take 2 tablets by mouth Daily for 1 day, THEN 1 tablet Daily for 4 days., Disp: 6 tablet, Rfl: 0    clobetasol prop emollient base (TEMOVATE) 0.05 % emollient cream, Apply a small amount to affected area twice daily for 6 to 8 weeks or until symptoms completely resolved.  Then apply 3 times weekly for maintenance., Disp: 30 g, Rfl: 2    clopidogrel (PLAVIX) 75 " MG tablet, Take 1 tablet by mouth Daily., Disp: 30 tablet, Rfl: 1    Diclofenac Sodium (VOLTAREN) 1 % gel gel, Apply 4 g topically to the appropriate area as directed 4 (Four) Times a Day As Needed (pain)., Disp: 4 g, Rfl: 5    Esomeprazole Magnesium (NEXIUM PO), Take 75 mg by mouth Before Breakfast., Disp: , Rfl:     FLUoxetine (PROzac) 40 MG capsule, Take 1 capsule by mouth Daily., Disp: 90 capsule, Rfl: 1    meclizine (ANTIVERT) 12.5 MG tablet, Take 1 tablet by mouth 3 (Three) Times a Day As Needed for Dizziness., Disp: 90 tablet, Rfl: 2      Allergies: Penicillins, Codeine, and Imodium a-d [loperamide hcl]    Physical Exam  Constitutional:       Appearance: Normal appearance.   HENT:      Head: Normocephalic.   Eyes:      Conjunctiva/sclera: Conjunctivae normal.      Pupils: Pupils are equal, round, and reactive to light.   Cardiovascular:      Rate and Rhythm: Normal rate and regular rhythm.      Heart sounds: Normal heart sounds.   Pulmonary:      Effort: Pulmonary effort is normal.      Breath sounds: Normal breath sounds.   Abdominal:      Tenderness: There is no abdominal tenderness.   Musculoskeletal:         General: Normal range of motion.   Skin:     General: Skin is warm and dry.      Capillary Refill: Capillary refill takes less than 2 seconds.   Neurological:      General: No focal deficit present.      Mental Status: She is alert and oriented to person, place, and time.   Psychiatric:         Mood and Affect: Mood normal.         Behavior: Behavior normal.         Thought Content: Thought content normal.         Judgment: Judgment normal.          Physical Exam         Result Review :                Results            Assessment and Plan    Diagnoses and all orders for this visit:    1. Other fatigue (Primary)  Comments:  Get labs done as scheduled by PCP. F/U with cardio. RTC or go to the ER for worsened sx.    2. Pneumonia of right lower lobe due to infectious organism  Comments:  Finish Zmax.  Continue Mucinex and Phen DM PRN.  Increase fluids. RTC for worsened sx and if not improving in 1 week.  Orders:  -     promethazine-dextromethorphan (PROMETHAZINE-DM) 6.25-15 MG/5ML syrup; Take 5 mL by mouth 4 (Four) Times a Day As Needed for Cough.  Dispense: 200 mL; Refill: 0  -     azithromycin (Zithromax Z-Reuben) 250 MG tablet; Take 2 tablets by mouth Daily for 1 day, THEN 1 tablet Daily for 4 days.  Dispense: 6 tablet; Refill: 0                  Follow Up   Return in about 1 week (around 1/6/2025) for if not improving or sooner if symptoms worsen.  Patient was given instructions and counseling regarding her condition or for health maintenance advice. Please see specific information pulled into the AVS if appropriate.     HUE Zacarias

## 2025-01-07 RX ORDER — FLUOXETINE 40 MG/1
40 CAPSULE ORAL DAILY
Qty: 90 CAPSULE | Refills: 1 | OUTPATIENT
Start: 2025-01-07

## 2025-01-09 ENCOUNTER — TELEPHONE (OUTPATIENT)
Dept: FAMILY MEDICINE CLINIC | Facility: CLINIC | Age: 80
End: 2025-01-09
Payer: MEDICARE

## 2025-01-14 RX ORDER — FLUOXETINE 40 MG/1
40 CAPSULE ORAL DAILY
Qty: 30 CAPSULE | Refills: 0 | Status: SHIPPED | OUTPATIENT
Start: 2025-01-14

## 2025-01-14 NOTE — TELEPHONE ENCOUNTER
Caller: Alysia Aguayo    Relationship: Self    Best call back number: [phone]     Requested Prescriptions:   Requested Prescriptions     Pending Prescriptions Disp Refills    FLUoxetine (PROzac) 40 MG capsule 90 capsule 1     Sig: Take 1 capsule by mouth Daily.        Pharmacy where request should be sent: [facility] [address] - [phone] [facility] [phone]      Last office visit with prescribing clinician: 12/2/2024   Last telemedicine visit with prescribing clinician: Visit date not found   Next office visit with prescribing clinician: Visit date not found     Additional details provided by patient:     Does the patient have less than a 3 day supply:  [] Yes  [x] No    Would you like a call back once the refill request has been completed: [] Yes [x] No    If the office needs to give you a call back, can they leave a voicemail: [] Yes [x] No    Dean Charles Rep   01/14/25 14:34 EST

## 2025-03-03 ENCOUNTER — OFFICE VISIT (OUTPATIENT)
Dept: FAMILY MEDICINE CLINIC | Facility: CLINIC | Age: 80
End: 2025-03-03
Payer: MEDICARE

## 2025-03-03 VITALS
HEART RATE: 68 BPM | WEIGHT: 158.19 LBS | DIASTOLIC BLOOD PRESSURE: 76 MMHG | HEIGHT: 63 IN | OXYGEN SATURATION: 97 % | BODY MASS INDEX: 28.03 KG/M2 | RESPIRATION RATE: 17 BRPM | SYSTOLIC BLOOD PRESSURE: 128 MMHG

## 2025-03-03 DIAGNOSIS — R19.8 CHANGE IN BOWEL MOVEMENT: Primary | ICD-10-CM

## 2025-03-03 DIAGNOSIS — F33.0 MAJOR DEPRESSIVE DISORDER, RECURRENT, MILD: ICD-10-CM

## 2025-03-03 DIAGNOSIS — N90.4 LICHEN SCLEROSUS OF FEMALE GENITALIA: ICD-10-CM

## 2025-03-03 PROCEDURE — 99214 OFFICE O/P EST MOD 30 MIN: CPT | Performed by: PHYSICIAN ASSISTANT

## 2025-03-03 PROCEDURE — 3078F DIAST BP <80 MM HG: CPT | Performed by: PHYSICIAN ASSISTANT

## 2025-03-03 PROCEDURE — 1126F AMNT PAIN NOTED NONE PRSNT: CPT | Performed by: PHYSICIAN ASSISTANT

## 2025-03-03 PROCEDURE — 3074F SYST BP LT 130 MM HG: CPT | Performed by: PHYSICIAN ASSISTANT

## 2025-03-03 RX ORDER — CLOBETASOL PROPIONATE 0.5 MG/G
CREAM TOPICAL
Qty: 15 G | Refills: 0 | Status: SHIPPED | OUTPATIENT
Start: 2025-03-03

## 2025-03-03 RX ORDER — FLUOXETINE HYDROCHLORIDE 40 MG/1
40 CAPSULE ORAL DAILY
Qty: 90 CAPSULE | Refills: 1 | Status: SHIPPED | OUTPATIENT
Start: 2025-03-03 | End: 2025-03-03 | Stop reason: SDUPTHER

## 2025-03-03 RX ORDER — FLUOXETINE HYDROCHLORIDE 40 MG/1
40 CAPSULE ORAL DAILY
Qty: 90 CAPSULE | Refills: 1 | Status: SHIPPED | OUTPATIENT
Start: 2025-03-03

## 2025-03-03 NOTE — PROGRESS NOTES
"Chief Complaint  Med Refill (Patient would like a refill on Clobetasol Propionate that she has taken in the past. )    Subjective          Alysia Aguayo presents to [facility]  History of Present Illness  Patient in today for medication recheck and refills.  She states the fluoxetine continues to work well for her depression symptoms and would like to continue on the current dosage.  Denies any side effects of medication.  She is also requesting a refill on the Temovate cream that she has used for her lichen sclerosus of vaginal area.   This has previously come from her gynecologist, but her refills had  before she could refill as does not use often and is requesting a one-time fill until she can get back in with them. She also states has been having issues over past few years with uncontrollable bowel movements and is interested in seeing GI. She denies blood in stool.   Depression  Presents for follow-up visit. Symptoms include depressed mood. Patient is not experiencing: nervousness/anxiety, shortness of breath, suicidal ideas, chest pain and nausea.  Her past medical history is significant for depression.       Objective   Vital Signs:   /76 (BP Location: Left arm, Patient Position: Sitting, Cuff Size: Adult)   Pulse 68   Resp 17   Ht 160 cm (63\")   Wt 71.8 kg (158 lb 3 oz)   SpO2 97%   BMI 28.02 kg/m²     Body mass index is 28.02 kg/m².    Review of Systems   Constitutional:  Negative for fever.   HENT:  Negative for congestion and sore throat.    Respiratory:  Negative for cough and shortness of breath.    Cardiovascular:  Negative for chest pain.   Gastrointestinal:  Negative for abdominal pain, diarrhea, nausea and vomiting.   Skin:  Positive for rash.   Psychiatric/Behavioral:  Positive for depressed mood. Negative for suicidal ideas. The patient is not nervous/anxious.        Past History:  Medical History: has a past medical history of Arthritis, Back " pain, Carotid artery disease, Coronary artery disease, Depression, GERD (gastroesophageal reflux disease), History of alcoholism, Hyperlipidemia, Hypertension, Kidney disease, Neck pain, Spastic colon, Spinal stenosis, Stroke (2022), Vaginal itching, Wears dentures, and Wears reading eyeglasses.   Surgical History: has a past surgical history that includes Hysterectomy; Cholecystectomy; Tubal ligation; Lumbar discectomy (07/10/2014); Cataract extraction (Bilateral); Colonoscopy (2016); Esophageal dilation; Coronary stent placement (2010); Cardiac catheterization (2010); lumbar laminectomy discectomy decompression (N/A, 10/17/2019); Upper gastrointestinal endoscopy; Interventional radiology procedure (N/A, 11/03/2022); Other surgical history; and Cerebral angiogram (N/A, 12/19/2023).   Family History: family history includes Cancer in her father; Coronary artery disease in her mother; Emphysema in her mother; Heart disease in her mother; Lung cancer in her father.   Social History: reports that she quit smoking about 25 years ago. Her smoking use included cigarettes. She started smoking about 65 years ago. She has a 60 pack-year smoking history. She has been exposed to tobacco smoke. She has never used smokeless tobacco. She reports that she does not drink alcohol and does not use drugs.      Current Outpatient Medications:     acetaminophen (TYLENOL) 325 MG tablet, Take 2 tablets by mouth Every 4 (Four) Hours As Needed for Mild Pain or Moderate Pain., Disp: , Rfl:     amLODIPine (NORVASC) 5 MG tablet, Take 0.5 tablets by mouth 2 (Two) Times a Day., Disp: , Rfl:     aspirin 81 MG chewable tablet, Chew 1 tablet Daily., Disp: 30 tablet, Rfl: 1    atorvastatin (Lipitor) 80 MG tablet, Take 1 tablet by mouth Daily., Disp: 90 tablet, Rfl: 4    clobetasol prop emollient base (TEMOVATE-E) 0.05 % emollient cream, Apply a small amount to affected area twice daily for 6 to 8 weeks or until symptoms completely resolved.  Then  apply 3 times weekly for maintenance., Disp: 15 g, Rfl: 0    clopidogrel (PLAVIX) 75 MG tablet, Take 1 tablet by mouth Daily., Disp: 30 tablet, Rfl: 1    Esomeprazole Magnesium (NEXIUM PO), Take 75 mg by mouth Before Breakfast., Disp: , Rfl:     FLUoxetine (PROzac) 40 MG capsule, Take 1 capsule by mouth Daily., Disp: 90 capsule, Rfl: 1    meclizine (ANTIVERT) 12.5 MG tablet, Take 1 tablet by mouth 3 (Three) Times a Day As Needed for Dizziness., Disp: 90 tablet, Rfl: 2  Allergies: Penicillins, Codeine, and Imodium a-d [loperamide hcl]    Physical Exam  Constitutional:       Appearance: Normal appearance.   HENT:      Right Ear: Tympanic membrane normal.      Left Ear: Tympanic membrane normal.      Nose: Nose normal.      Mouth/Throat:      Mouth: Mucous membranes are moist.   Eyes:      Pupils: Pupils are equal, round, and reactive to light.   Cardiovascular:      Rate and Rhythm: Normal rate and regular rhythm.      Heart sounds: Normal heart sounds.   Pulmonary:      Effort: Pulmonary effort is normal.      Breath sounds: Normal breath sounds.   Abdominal:      Palpations: Abdomen is soft.      Tenderness: There is no abdominal tenderness.   Neurological:      Mental Status: She is alert and oriented to person, place, and time.   Psychiatric:         Mood and Affect: Mood normal.         Behavior: Behavior normal.             Assessment and Plan   Diagnoses and all orders for this visit:    1. Change in bowel movement (Primary)  -     Ambulatory Referral to Gastroenterology  Will put in referral for GI for further evaluation- she also wants to discuss colonoscopy with them; rtc or to ER prior for any acute worsening of symptoms  2. Lichen sclerosus of female genitalia  Put in one refill of steroid cream- discussed is potent steroid cream and to use sparingly- would recommend to f/up with gynecology for further refill  and she states will schedule a f/up with gyn office   3. Major depressive disorder, recurrent,  mild  Refilled fluoxetine- denies side effects; rtc prior to recheck as needed   Other orders  -     Discontinue: FLUoxetine (PROzac) 40 MG capsule; Take 1 capsule by mouth Daily.  Dispense: 90 capsule; Refill: 1  -     FLUoxetine (PROzac) 40 MG capsule; Take 1 capsule by mouth Daily.  Dispense: 90 capsule; Refill: 1  -     clobetasol prop emollient base (TEMOVATE-E) 0.05 % emollient cream; Apply a small amount to affected area twice daily for 6 to 8 weeks or until symptoms completely resolved.  Then apply 3 times weekly for maintenance.  Dispense: 15 g; Refill: 0            Follow Up   No follow-ups on file.  Patient was given instructions and counseling regarding her condition or for health maintenance advice. Please see specific information pulled into the AVS if appropriate.     Yesica Zhu PA-C

## 2025-03-04 ENCOUNTER — TELEPHONE (OUTPATIENT)
Dept: FAMILY MEDICINE CLINIC | Facility: CLINIC | Age: 80
End: 2025-03-04

## 2025-03-04 ENCOUNTER — OFFICE VISIT (OUTPATIENT)
Dept: PAIN MEDICINE | Facility: CLINIC | Age: 80
End: 2025-03-04
Payer: MEDICARE

## 2025-03-04 VITALS — WEIGHT: 159.8 LBS | BODY MASS INDEX: 28.31 KG/M2 | HEIGHT: 63 IN

## 2025-03-04 DIAGNOSIS — M96.1 LUMBAR POSTLAMINECTOMY SYNDROME: ICD-10-CM

## 2025-03-04 DIAGNOSIS — M47.812 CERVICAL SPONDYLOSIS WITHOUT MYELOPATHY: ICD-10-CM

## 2025-03-04 DIAGNOSIS — M54.81 OCCIPITAL NEURALGIA OF LEFT SIDE: Primary | ICD-10-CM

## 2025-03-04 DIAGNOSIS — M54.16 LUMBAR RADICULOPATHY: ICD-10-CM

## 2025-03-04 DIAGNOSIS — M54.81 OCCIPITAL NEURALGIA OF LEFT SIDE: ICD-10-CM

## 2025-03-04 DIAGNOSIS — M48.062 LUMBAR STENOSIS WITH NEUROGENIC CLAUDICATION: ICD-10-CM

## 2025-03-04 DIAGNOSIS — M79.18 MYOFASCIAL PAIN: ICD-10-CM

## 2025-03-04 PROCEDURE — 1159F MED LIST DOCD IN RCRD: CPT | Performed by: NURSE PRACTITIONER

## 2025-03-04 PROCEDURE — 1125F AMNT PAIN NOTED PAIN PRSNT: CPT | Performed by: NURSE PRACTITIONER

## 2025-03-04 PROCEDURE — 99214 OFFICE O/P EST MOD 30 MIN: CPT | Performed by: NURSE PRACTITIONER

## 2025-03-04 PROCEDURE — 1160F RVW MEDS BY RX/DR IN RCRD: CPT | Performed by: NURSE PRACTITIONER

## 2025-03-04 NOTE — TELEPHONE ENCOUNTER
Caller: Alysia Aguayo    Relationship: Self    Best call back number: [phone]     What specialty or service is being requested: COLONOSCOPY    What is the provider, practice or medical service name: [facility]    What is the office location: [place]    Any additional details: PATIENT TOLD DOCTOR THAT SHE WANTED TO GO TO [place] FOR COLONOSCOPY, BUT HAS CHANGED HER MIND, SHE WANTS TO DO IT IN [place] PLEASE

## 2025-03-04 NOTE — PROGRESS NOTES
"Chief Complaint: \"Neck pain and headaches.\"     History of Present Illness: Ms. Alysia Aguayo, 79 y.o. female originally referred by Dr. Dionicio Pollard in consultation for chronic neck pain.  She also has an ongoing history of left occipital neuralgia, from which she underwent left greater occipital nerve blocks combined with trigger point injections at the bilateral levator scapulae and bilateral trapezius, performed on October 8, 2022, which has provided her with 70% to 80% reduction of her pain and headaches, lasting 6 months.  More recently she contacted the office in regards to a follow-up appointment to address her recurrent occipital headaches and neck pain.  On January 17, 2024, she underwent  cervical facet joint injections bilateral C5-C6 and bilateral C6-C7, from which she reports experiencing about 60-70% pain relief and functional improvement lasting almost 6 months.  Due to recurrence of symptoms, on August 28, 2024, she underwent repeat therapeutic cervical facet joint injections bilateral C5-C6 and bilateral C6-C7, from which she reports experiencing 80% pain relief and functional improvement lasting almost 6 months.  She is currently experiencing daily left-sided occipital headaches and neck pain.   Pain history: Patient reports a 20+-year history of pain, which began after riding a roller coaster.   Pain description: constant pain with intermittent exacerbation, described as dull/tooth ache and throbbing sensation.   Radiation of pain: The neck pain radiates into the left occipital region and into bilateral shoulder blades (LT>RT) HEADACHES>NECK.  She is having some anterior left shoulder pain.  Pain intensity today: 6/10  Average pain intensity last week: 5/10  Pain intensity ranges from: 7/10 to 8/10  Aggravating factors:  Pain increases with flexion, extension and rotation of the cervical spine.   Alleviating factors: Pain decreases with Tylenol, ice, heat, icy hot,   Associated symptoms: "   Patient denies pain, numbness or weakness in the upper extremities. She reports  some intertmittent tingling in her LUE.  Patient denies any new bladder or bowel problems.   Patient denies difficulties with her balance or recent falls.   Patient reports left occipital headaches associated with her neck pain daily  Pain interferes with regular activities, ADLs, and affects patient's quality of life  Pain interferes with sleep causing sleep fragmentation   Muscle spasms  Stiffness       Review of previous therapies and additional medical records:  Alysia Aguayo has already failed the following measures, including:   Conservative measures: oral analgesics, physical therapy, ice and heat   Interventional measures: 08/28/2024: therapeutic cervical facet joint injections bilateral C5-C6 and C6-C7  01/17/2024: therapeutic cervical facet joint injections bilateral C5-C6 and C6-C7  10/5/2022: left greater occipital nerve block combined with trigger point injection at the bilateral levator scapulae and bilateral trapezius  03/23/2022: left greater occipital nerve block combined with trigger point injection at the bilateral levator scapulae and bilateral trapezius  03/17/2021: left greater occipital nerve block combined with trigger point injection at the bilateral levator scapulae and bilateral trapezius  10/05/2020: left greater occipital nerve block combined with trigger point injection at the bilateral levator scapulae and bilateral trapezius  01/20/2020: left greater occipital NB, and TPI at the left levator scapulae  09/11/2019: DxTx left greater occipital NB, and TPI at the left levator scapulae  07/24/2019: diagnostic and therapeutic cervical facet joint injections bilateral C5-C6 and C6-C7 combined with trigger point injections at the bilateral levator scapula and bilateral trapezius  Patient underwent diagnostic/therapeutic right cervical facet joint injections at C5-C6, C6-C7, combined with trigger point  injections at the right trapezius, and right levator scapulae on 07/22/2015 and reports complete pain relief for almost 2 years.   Surgical measures: No history of cervical spine surgery  Alysia Aguayo underwent neurosurgical consultation with Dr. Dionicio Pollard on 05/12/2015, and was found not to be a surgical candidate for her neck.  Alysia Aguayo presents with significant comorbidities including depression, hypertension, hyperlipidemia, coronary artery disease; engaged in treatment.   In terms of current analgesics, Alysia Aguayo takes: Tylenol, tizanidine, Voltaren gel. Patient also takes Prozac  I have reviewed Karthik Report is consistent to medication reconciliation.    Global Pain Scale 08-21 2018 05-20 2019 07-18 2019 08-29 2019 01-06- 2020 09-20 2020 05-25 2021 03-07 2022       Pain 15 12 13 20 8 18 13 17       Feelings 0 0 2   4 3 4 1 8       Clinical outcomes 3 3 1   3 9 7 2 10       Activities 9 0 2 25 3 13 13 3       GPS Total: 27 15 18 52 23 42 29 38         Review of Diagnostic Studies:  MRI of the thoracic spine 3/6/2024: Multilevel thoracic spondylosis, with Modic endplate changes.  No high-grade spinal stenosis or neuroforaminal stenosis.  MRI of the lumbar spine without contrast 3/6/2024: Multilevel spondylosis, with endplate changes.  Grade 1 anterior listhesis of L4 on L5.  L1-L2: Right sided disc protrusion with facet arthritis, with mild central spinal stenosis severe right and mild left neuroforaminal stenosis.  L2-L3: Disc bulge with ligamentum flavum thickening and facet hypertrophy with moderate central spinal stenosis with mild to moderate bilateral neuroforaminal stenosis.  L3-L4: Disc bulge with a left-sided disc protrusion, with mild central spinal stenosis severe left and mild to moderate right neuroforaminal stenosis.  L4-L5: Postsurgical changes from prior decompressive laminectomy.  Disc bulging with facet hypertrophy, with moderate left and mild right  neuroforaminal stenosis.  L5-S1: Postoperative changes from prior decompressive laminectomy with a disc bulge and facet hypertrophy with severe right and mild to moderate left neuroforaminal stenosis  Lumbar spine x-rays with flexion-extension views 3/6/2024: Anterior listhesis of L4 on L5 with approximately 5 mm in extension, and increases to 67 mm with flexion.  MRI of the cervical spine without contrast on February 20, 2019, radiology report: Mild to moderate degenerative disc disease in the lower cervical spine.  Bone marrow signal is within normal limits.  Visualized portions of the posterior fossa are unremarkable.  Cervical cord demonstrates normal course, caliber, and signal characteristics.   C2-C3: Moderate facet arthrosis causing mild right neuroforaminal stenosis.  Left neuroforamina is preserved.  The central canal is intact.    C3-C4: Mild diffuse disc osteophytes and uncovertebral degenerative change on the left causing mild to moderate left foraminal stenosis.  Right neuroforaminal is preserved.  Mild canal stenosis  C4-C5: Mild diffuse disc osteophyte with moderate right facet arthrosis.  Mild bilateral neuroforaminal stenosis.  Mild canal stenosis  C5-C6: Diffuse disc osteophyte and uncovertebral degenerative change.  Advanced bilateral neuroforaminal stenosis and mild canal stenosis  C6-C7: Diffuse disc osteophyte and uncovertebral degenerative change causing at least mild central canal stenosis with mild impingement of the ventral cord and advanced bilateral neuroforaminal stenosis  C7-T1: No significant disc herniation or protrusion.  No canal stenosis or foraminal stenosis.  XR SPINE, LUMBAR, AP AND LATERAL WITH FLEXION AND EXTENSION-03/06/2019:  Extensive degenerative changes seen at the L1/L2 level. Slight scoliotic curvature with convexity to the right. Alignment stable in flexion and extension.    Review of Systems   Musculoskeletal:  Positive for neck pain and neck stiffness.    Neurological:  Positive for headaches.   All other systems reviewed and are negative.     Patient Active Problem List   Diagnosis    Cervical spondylosis without myelopathy    Myofascial pain    Frequent headaches    History of alcohol abuse    CAD s/p stent placement    History of lumbar laminectomy for spinal cord decompression    Occipital neuralgia of left side    Lumbar stenosis with neurogenic claudication    DDD (degenerative disc disease), lumbar    Anxiety and Depression    Left arm numbness    Essential hypertension    Mixed hyperlipidemia    Stroke-like symptoms    Cerebrovascular accident (CVA), unspecified mechanism    H/O: R MCA CVA (cerebrovascular accident)    Coronary arteriosclerosis in native artery    Osteoarthritis    History of hysterectomy    Encounter for screening mammogram for malignant neoplasm of breast    Medicare annual wellness visit, subsequent    Vaginal itching    Lichen sclerosus of female genitalia    Osteopenia, senile    Subclavian steal syndrome       Past Medical History:   Diagnosis Date    Arthritis     Back pain     Carotid artery disease     Coronary artery disease     Depression     GERD (gastroesophageal reflux disease)     History of alcoholism     Hyperlipidemia     Hypertension     Kidney disease     was stage 4 but taking excessive amounts of ibuprofen, f/u with nephrologist at the time, resolved since and was to see nephrologist as needed     Neck pain     Spastic colon     Spinal stenosis     Stroke 2022    Vaginal itching     Wears dentures     Wears reading eyeglasses          Past Surgical History:   Procedure Laterality Date    CARDIAC CATHETERIZATION  2010    x2     CATARACT EXTRACTION Bilateral     CEREBRAL ANGIOGRAM N/A 12/19/2023    Procedure: Cerebral angiogram;  Surgeon: Carlos Concepcion MD;  Location: [facility] CATH INVASIVE LOCATION;  Service: Interventional Radiology;  Laterality: N/A;  [facility] same day Dr. Concepcion is available. w/o anesthesia.      Subclavian cyst, subclavian steal    CHOLECYSTECTOMY      COLONOSCOPY  2016    CORONARY STENT PLACEMENT  2010    x3     ESOPHAGEAL DILATATION      HYSTERECTOMY      INTERVENTIONAL RADIOLOGY PROCEDURE N/A 2022    Procedure: IR angiogram vertebral cervical intracranial;  Surgeon: Carlos Faust MD;  Location:  [facility] CATH INVASIVE LOCATION;  Service: Interventional Radiology;  Laterality: N/A;  w/ possible stenting    LUMBAR DISCECTOMY  07/10/2014    Rt- L1/2 Dr. Dionicio Pollard     LUMBAR LAMINECTOMY DISCECTOMY DECOMPRESSION N/A 10/17/2019    Procedure: LUMBAR LAMINECTOMY L3-5;  Surgeon: Dionicio Pollard MD;  Location:  [facility] OR;  Service: Neurosurgery    OTHER SURGICAL HISTORY      CAROTID ANGIOGRAM 2023 PER DR. FAUST    TUBAL ABDOMINAL LIGATION      UPPER GASTROINTESTINAL ENDOSCOPY           Family History   Problem Relation Age of Onset    Heart disease Mother     Emphysema Mother     Coronary artery disease Mother     Cancer Father     Lung cancer Father     Colon cancer Neg Hx          Social History     Socioeconomic History    Marital status:    Tobacco Use    Smoking status: Former     Current packs/day: 0.00     Average packs/day: 1.5 packs/day for 40.0 years (60.0 ttl pk-yrs)     Types: Cigarettes     Start date: 1960     Quit date: 2000     Years since quittin.1     Passive exposure: Past    Smokeless tobacco: Never   Vaping Use    Vaping status: Never Used   Substance and Sexual Activity    Alcohol use: No     Comment: hasn't used since , hx of alcoholism    Drug use: No    Sexual activity: Not Currently           Current Outpatient Medications:     acetaminophen (TYLENOL) 325 MG tablet, Take 2 tablets by mouth Every 4 (Four) Hours As Needed for Mild Pain or Moderate Pain., Disp: , Rfl:     amLODIPine (NORVASC) 5 MG tablet, Take 0.5 tablets by mouth 2 (Two) Times a Day., Disp: , Rfl:     aspirin 81 MG chewable tablet, Chew 1 tablet Daily., Disp: 30 tablet,  "Rfl: 1    atorvastatin (Lipitor) 80 MG tablet, Take 1 tablet by mouth Daily., Disp: 90 tablet, Rfl: 4    clobetasol prop emollient base (TEMOVATE-E) 0.05 % emollient cream, Apply a small amount to affected area twice daily for 6 to 8 weeks or until symptoms completely resolved.  Then apply 3 times weekly for maintenance., Disp: 15 g, Rfl: 0    clopidogrel (PLAVIX) 75 MG tablet, Take 1 tablet by mouth Daily., Disp: 30 tablet, Rfl: 1    Esomeprazole Magnesium (NEXIUM PO), Take 75 mg by mouth Before Breakfast., Disp: , Rfl:     FLUoxetine (PROzac) 40 MG capsule, Take 1 capsule by mouth Daily., Disp: 90 capsule, Rfl: 1    meclizine (ANTIVERT) 12.5 MG tablet, Take 1 tablet by mouth 3 (Three) Times a Day As Needed for Dizziness., Disp: 90 tablet, Rfl: 2      Allergies   Allergen Reactions    Penicillins Hives    Codeine Nausea And Vomiting    Imodium A-D [Loperamide Hcl] Nausea And Vomiting         Ht 160 cm (63\")   Wt 72.5 kg (159 lb 12.8 oz)   BMI 28.31 kg/m²         Physical Exam:   Constitutional: Patient is oriented to person, place, and time. Patient appears well-developed and well-nourished.   HENT: Head: Normocephalic and atraumatic. Eyes: Conjunctivae and lids are normal. Pupils: Equal, round, reactive to light.   Neck: Trachea normal. Neck supple. No JVD present.   Lymphatic: No cervical adenopathy  Pulmonary Respiratory effort: No increased work of breathing or signs of respiratory distress.   Musculoskeletal   Gait and station: Gait evaluation demonstrated a normal gait   Cervical spine: Passive and active range of motion are almost full and but reproduce pain. Flexion, extension and rotation of the cervical spine increased and reproduced pain. Cervical facet joint loading maneuvers are positive. Palpation of the left occipital and suboccipital region reproduces pain  Muscles: Presence of active trigger points at the bilateral levator scapulae and bilateral trapezius   Shoulders: The range of motion of the " glenohumeral joints is full and without pain, there is pain with ROM on the left. Rotator cuff strength is 5/5.Neurological: Patient is alert and oriented to person, place, and time. Speech: speech is normal. Cortical function: Normal mental status.   Cranial nerves: Cranial nerves 2-12 intact.   Reflex Scores:  Right brachioradialis: 3+  Left brachioradialis: 3+  Right biceps: 3+  Left biceps: 3+  Right triceps: 3+  Left triceps: 3+  Motor strength: 5/5  Motor Tone: normal tone.   Involuntary movements: none.   Superficial/Primitive Reflexes: primitive reflexes were absent.   Right Fox: absent  Left Fox: absent  Right ankle clonus: absent  Left ankle clonus: absent   Spurling sign is negative. Lhermitte sign is negative. Negative long tract signs.  Sensation: No sensory loss. Sensory exam: intact to light touch, intact pain and temperature sensation, intact vibration sensation and normal proprioception.   Coordination: Normal finger to nose and heel to shin. Normal balance and negative. Romberg's sign negative.   Skin and subcutaneous tissue: Skin is warm and intact. No rash noted. No cyanosis.   Psychiatric: Judgment and insight: Normal. Orientation to person, place and time: Normal. Recent and remote memory: Intact. Mood and affect: Normal.     ASSESSMENT:   1. Occipital neuralgia of left side    2. Myofascial pain    3. Cervical spondylosis without myelopathy    4. Lumbar postlaminectomy syndrome    5. Lumbar stenosis with neurogenic claudication    6. Lumbar radiculopathy               PLAN/MEDICAL DECISION MAKING: Ms. Alysia Aguayo, 79 y.o. female has a longstanding history of chronic neck pain associated with occipital headaches.  MRI of the cervical spine without contrast performed in 2019, revealed multilevel disc degeneration with facet arthritis.  There are multiple levels that contain spinal canal as well as neural foraminal stenosis.  She has been evaluated by neurosurgery in the past for  her neck pain, Dr. Dionicio Pollard, and was found not to be a surgical candidate.  Over the years, she has remarkably responded to interventional pain management measures as well as conservatism.  She continues with mechanical/axial neck pain, as well as predominantly left-sided occipital neuralgia.  She continues to deny any obvious radicular complaints, pain or numbness.  I had a lengthy conversation with Ms. Alysia Aguayo regarding her chronic pain condition and potential therapeutic options including risks, benefits, alternative therapies, to name a few. Patient has failed to obtain pain relief with conservative measures, as referenced above. She continues to respond exceedingly well to interventional pain management procedures as referenced above.  I have reviewed all available patient's medical records as well as previous therapies as referenced above.  Therefore, I have proposed the following plan:  1. Interventional pain management measures: Patient will be scheduled for left greater occipital nerve block by hydrodissection technique under ultrasound and fluoroscopic guidance combined with trigger point injections at the bilateral levator scapulae and bilateral trapezius to maximize her rehabilitation.  If she is still having neck pain thereafter, due to patient's significant analgesic benefit following cervical facet joint injections performed on 08/28/2024, which provided her with 70 to 80% pain relief lasting over 6 months, we will proceed with bilateral cervical facet joint injections at C5-C6, C6-C7. If patient fails to obtain sustained pain relief, then, we will proceed with diagnostic bilateral cervical medial branch blocks at C4, C5, C6; for bilateral cervical facet joints at C5-C6, C6-C7 to clarify the origin of her unrelenting pain. If patient experiences more than 80% pain relief along with significant improvement in the range of motion of the cervical spine, then, patient will be scheduled for  a second set of diagnostic bilateral cervical medial branch blocks, to then, proceed with cervical medial branch rhizotomies.  Otherwise, may consider left greater occipital nerve block by hydrodissection technique under ultrasound and fluoroscopic guidance combined with trigger point injections at the bilateral levator scapulae and bilateral trapezius to maximize her rehabilitation.   2. Diagnostics:  A. Depending on continued response, we may consider repeating MRI of the cervical spine without contrast.  3. Pharmacological measures. Reviewed and Discussed.   A. Tylenol. Patient also takes Prozac  B. Continue Voltaren gel  C. Continue tizanidine 2 mg take ½ tablet three times a day as needed for muscle spasms  4.  Long-term rehabilitation efforts:  A. The patient does not have a history of falls. I did complete a risk assessment for falls.   B. Patient will start a comprehensive physical therapy program for water therapy, upper body strengthening/posture correction, gait and balance training, neurodynamics, myofascial release, cupping and dry needling   C. Start an exercise program such as yoga and water therapy  D. Use TENS unit on regular basis  E. Contrast therapy: Apply ice-packs for 15-20 minutes, followed by heating pads for 15-20 minutes to affected area   5. The patient has been instructed to contact my office with any questions or difficulties. The patient understands the plan and agrees to proceed accordingly.        Pain Medications               acetaminophen (TYLENOL) 325 MG tablet Take 2 tablets by mouth Every 4 (Four) Hours As Needed for Mild Pain or Moderate Pain.    aspirin 81 MG chewable tablet Chew 1 tablet Daily.    FLUoxetine (PROzac) 40 MG capsule Take 1 capsule by mouth Daily.               Patient Care Team:  Yesica Zhu PA-C as PCP - General  Dionicio Pollard MD as Consulting Physician (Neurosurgery)  Heriberto Lopes MD as Consulting Physician (Pain Medicine)  Alana Wang,  APRN as Nurse Practitioner (Pain Medicine)     No orders of the defined types were placed in this encounter.        Future Appointments   Date Time Provider Department Center   12/1/2025 11:00 AM [facility] RM 5 [facility]   12/1/2025  1:30 PM Carlos Concepcion MD [facility]           [facility]

## 2025-03-12 ENCOUNTER — OUTSIDE FACILITY SERVICE (OUTPATIENT)
Dept: PAIN MEDICINE | Facility: CLINIC | Age: 80
End: 2025-03-12
Payer: MEDICARE

## 2025-03-18 ENCOUNTER — TELEPHONE (OUTPATIENT)
Dept: PAIN MEDICINE | Facility: CLINIC | Age: 80
End: 2025-03-18
Payer: MEDICARE

## 2025-03-18 NOTE — TELEPHONE ENCOUNTER
FOLLOW UP CALL AFTER PROCEDURE    I spoke with the patient regarding how she is feeling after his/her procedure with Dr Lopes on 3/12/25. Patient reports that she is doing well.    Alysia martin underwent a see procedure note on 3/12/25. Patient reported 70% relief at the time of after procedure exam with Dr Lopes. In addition, patient experienced significant functional improvement (perforing activities without experiencing pain compared with examoination prior to procedure that produced these activities.)    Pain level before procedure: 9/10  Pain level after procedure: 0/10    Today, the patient reports 70% pain relief (pain level 3/10.)    Patient denies side effects or complications.  Patient does not have any questions or concerns at this time.    Advised patient of follow up with [facility] on 7/15/25.

## 2025-06-04 DIAGNOSIS — I65.01 STENOSIS OF RIGHT VERTEBRAL ARTERY: ICD-10-CM

## 2025-06-04 DIAGNOSIS — G45.8 SUBCLAVIAN STEAL SYNDROME: Primary | ICD-10-CM

## 2025-06-04 RX ORDER — ATORVASTATIN CALCIUM 80 MG/1
80 TABLET, FILM COATED ORAL DAILY
Qty: 90 TABLET | Refills: 4 | Status: SHIPPED | OUTPATIENT
Start: 2025-06-04

## 2025-06-12 ENCOUNTER — TELEPHONE (OUTPATIENT)
Dept: PAIN MEDICINE | Facility: CLINIC | Age: 80
End: 2025-06-12

## 2025-06-12 NOTE — TELEPHONE ENCOUNTER
Provider: BASIL    Caller: Alysia Aguayo    Relationship to Patient: Self    Pharmacy:     Phone Number: [phone]    Reason for Call: PT CALLED TO ASK IF SHE CAN JUST SET UP THE [facility] APPT AND NOT COMING IN 7/2025 PT IS GOING [place] 7/21/25 BUT WOULD LIKE TO GET IT BEFORE HER VACATION

## 2025-06-16 NOTE — TELEPHONE ENCOUNTER
Provider: BASIL     Caller: Alysia Aguayo     Relationship to Patient: Self       Phone Number: [phone]     Reason for Call: PT CALLED TO ASK IF SHE CAN JUST SET UP THE [facility] APPT AND NOT COMING IN 7/2025 PT IS GOING ON [place] 7[place]21/25 BUT WOULD LIKE TO GET IT BEFORE HER VACATION    PATIENT WOULD LIKE TO SCHEDULE PROCEDURE ASAP

## 2025-06-18 DIAGNOSIS — M47.812 CERVICAL SPONDYLOSIS WITHOUT MYELOPATHY: Primary | ICD-10-CM

## 2025-06-25 ENCOUNTER — TELEPHONE (OUTPATIENT)
Age: 80
End: 2025-06-25
Payer: MEDICARE

## 2025-06-25 ENCOUNTER — OUTSIDE FACILITY SERVICE (OUTPATIENT)
Dept: PAIN MEDICINE | Facility: CLINIC | Age: 80
End: 2025-06-25
Payer: MEDICARE

## 2025-06-25 ENCOUNTER — DOCUMENTATION (OUTPATIENT)
Dept: PAIN MEDICINE | Facility: CLINIC | Age: 80
End: 2025-06-25

## 2025-06-25 PROCEDURE — 64491 INJ PARAVERT F JNT C/T 2 LEV: CPT | Performed by: ANESTHESIOLOGY

## 2025-06-25 PROCEDURE — 64490 INJ PARAVERT F JNT C/T 1 LEV: CPT | Performed by: ANESTHESIOLOGY

## 2025-06-25 NOTE — PROGRESS NOTES
PROCEDURE DATE: 06/25/2025      PREOPERATIVE DIAGNOSES:  1. Cervical spondylosis without myelopathy   2. Occipital neuralgia of left side   3. Lumbar postlaminectomy syndrome   4. Myofascial pain   5. History of lumbar laminectomy for spinal cord decompression   6. History of coronary artery stent placement      POSTOPERATIVE DIAGNOSES:  1. Cervical spondylosis without myelopathy   2. Occipital neuralgia of left side   3. Lumbar postlaminectomy syndrome   4. Myofascial pain   5. History of lumbar laminectomy for spinal cord decompression   6. History of coronary artery stent placement      PROCEDURE: Therapeutic cervical facet joint injections: Bilateral C5-C6, bilateral C6-C7    ANESTHESIA: Local anesthesia plus IV sedation    COMPLICATIONS: None    EBL: 0     MEDICAL NECESSITY: A comprehensive evaluation including history and physical exam and pertinent physiological and functional assessment was performed. The patient presents with intractable pain due to the diagnoses listed above. The patient has failed to respond to conservative modalities as referenced under HPI including the impact of patient's moderate-to-severe pain contributing to significant impairment in daily activities, ADLs, and a negative impact on quality of life. Supporting diagnostic studies of patient's chronic pain condition have been reviewed. We have discussed using a stepwise approach starting with the shortest or least intense level of treatment, care, or service as determined by the extent required to diagnose and or treat a patient's condition. The treatments proposed are consistent with the patient's medical condition and are known to be as safe and effective by current guidelines and standard of care. The duration and frequency proposed are considered appropriate for the service in accordance with accepted standards of medical practice for the diagnosis and or treatment of the patient's condition and or intended to improve the patient's  level of function. We have documented outcome in physical and functional status for repeating interventions only upon return of pain and or deterioration in functional status. Due to the patient's significant analgesic benefit following cervical facet joint injections performed, we will proceed with bilateral cervical facet joint injections at C5-C6, C6-C7. See OV note for additional details.     PROCEDURE SUMMARY: After explaining all the risks and benefits of the procedure, an informed consent was obtained.  The patient's surgical site was confirmed with the patient and marked in the holding room accordingly. The patient was transferred to the procedure room and placed on the operating room table in the prone position. Time out was completed. Non-invasive monitors were applied. Administration of IV sedation was performed by a designated procedure room nurse who monitored the patient's level of consciousness and physiological status. Pertinent information was reported on a sedation flow sheet, which is part of the patient's permanent medical records. Start sedation time: 08:04 AM. The patient's vital signs remained within normal limits. The cervical spine area was prepped and draped in the sterile fashion. Using C-arm fluoroscopic guidance, the cervical facet joints at bilateral C5-C6, bilateral C6-C7, (targets) were identified.  The skin and subcutaneous tissues overlying the targets were anesthetized with five ml of 1% lidocaine followed by eight ml of 0.25% bupivacaine. Then, 22-gauge styletted needles were advanced into the cervical facet joints without difficulty. X-rays were obtained confirming appropriate needle placement. Aspiration was negative for blood and/or CSF. Then, 0.5 ml of 0.25% bupivacaine with 10 mg of Depo-Medrol were injected per cervical facet joint. Fluoroscopy was utilized using low-dose of radiation applying collimation, pulsed mode, and shielding following ALARA recommendations.  Fluoroscopy time: 8 seconds. End sedation time: 08:09 AM. The patient tolerated the procedure well and without incident.  Upon completion of the procedure, the patient was transferred to the recovery room in stable condition. The [facility] was [facility] neurologically intact and with appropriate discharge instructions. The ROM of the cervical spine improved significantly and without pain. Pain level before procedure: 8/10. Pain level after procedure: 0/10.    PLAN:  Follow-up with HUE Tatum in 20 weeks. See OV note.   The patient has been instructed to contact my office with any questions or difficulties. The patient understands the plan and agrees to proceed accordingly.      Signatures  Electronically signed by: Heriberto Lopes M.D.; JUNE 25, 2025, 10:21 AM EST (Author)

## 2025-06-25 NOTE — TELEPHONE ENCOUNTER
Attempted to contact patient, no answer. LVM to return call to office and provided call back number.      HUB relay:     Please schedule a 6mnth f/u, post joint injection with Alana, at pt preferred location.

## 2025-07-08 ENCOUNTER — TELEPHONE (OUTPATIENT)
Dept: CARDIOLOGY | Facility: CLINIC | Age: 80
End: 2025-07-08
Payer: MEDICARE

## (undated) DEVICE — LEX NEURO ANGIOGRAPHY: Brand: MEDLINE INDUSTRIES, INC.

## (undated) DEVICE — ST ACC MICROPUNCTURE .018 TRANSLSS/SS/TP 5F/10CM 21G/7CM

## (undated) DEVICE — LIMB HOLDER, WRIST/ANKLE: Brand: DEROYAL

## (undated) DEVICE — HDRST INTUB GENTLETOUCH SLOT 7IN RT

## (undated) DEVICE — ANTIBACTERIAL UNDYED BRAIDED (POLYGLACTIN 910), SYNTHETIC ABSORBABLE SUTURE: Brand: COATED VICRYL

## (undated) DEVICE — ROTATING HEMOSTATIC VALVE .096": Brand: RHV

## (undated) DEVICE — RADIFOCUS GLIDEWIRE: Brand: GLIDEWIRE

## (undated) DEVICE — INTRO SHEATH ART/FEM ENGAGE .038 5F12CM

## (undated) DEVICE — INTRO FLEXOR RB AQ TB SDARM .100 SHTL 7FR 80CM

## (undated) DEVICE — STPCK 3/WY HP M/RA W/OFF/HNDL 1050PSI STRL

## (undated) DEVICE — RADIFOCUS TORQUE DEVICE MULTI-TORQUE VISE: Brand: RADIFOCUS TORQUE DEVICE

## (undated) DEVICE — JP PERF DRN SIL FLT 7MM FULL: Brand: CARDINAL HEALTH

## (undated) DEVICE — TOOL 14MH30 LEGEND 14CM 3MM: Brand: MIDAS REX ™

## (undated) DEVICE — CVR HNDL LT SURG ACCSSRY BLU STRL

## (undated) DEVICE — GLV SURG PREMIERPRO MIC LTX PF SZ7.5 BRN

## (undated) DEVICE — CATH TEMPO 5F BER 100CM: Brand: TEMPO

## (undated) DEVICE — Device

## (undated) DEVICE — HOLDER: Brand: DEROYAL

## (undated) DEVICE — SUT ETHLN 3/0 FS1 30IN 669H

## (undated) DEVICE — SUT VIC PLS CTD ANTIB BR 3/0 8/18IN 45CM

## (undated) DEVICE — ANGIO-SEAL VIP VASCULAR CLOSURE DEVICE: Brand: ANGIO-SEAL

## (undated) DEVICE — 3M™ WARMING BLANKET, UPPER BODY, 10 PER CASE, 42268: Brand: BAIR HUGGER™

## (undated) DEVICE — PK NEURO DISC 10

## (undated) DEVICE — C-ARM DRAPE: Brand: DEROYAL

## (undated) DEVICE — ADHS LIQ MASTISOL 2/3ML

## (undated) DEVICE — ACCY PA700 LUBRICANT DIFFUSER MR7 4 PACK: Brand: MIDAS REX

## (undated) DEVICE — INTRO CHECKFLOW W/RAAB .038 8F70CM

## (undated) DEVICE — JACKSON-PRATT 100CC BULB RESERVOIR: Brand: CARDINAL HEALTH

## (undated) DEVICE — SUT VIC 0 CTD BR 18IN UNDYE VCP724D

## (undated) DEVICE — ELECTRD BLD EXT EDGE/INSUL 1P 4IN

## (undated) DEVICE — STPCK LP 1WY RA 200PSI

## (undated) DEVICE — 3M™ STERI-STRIP™ REINFORCED ADHESIVE SKIN CLOSURES, R1547, 1/2 IN X 4 IN (12 MM X 100 MM), 6 STRIPS/ENVELOPE: Brand: 3M™ STERI-STRIP™

## (undated) DEVICE — GLV SURG PREMIERPRO MIC LTX PF SZ6.5 BRN

## (undated) DEVICE — DRSNG WND BORDR/ADHS NONADHR/GZ LF 4X4IN STRL